# Patient Record
Sex: FEMALE | Race: WHITE | NOT HISPANIC OR LATINO | Employment: OTHER | URBAN - METROPOLITAN AREA
[De-identification: names, ages, dates, MRNs, and addresses within clinical notes are randomized per-mention and may not be internally consistent; named-entity substitution may affect disease eponyms.]

---

## 2017-05-09 ENCOUNTER — OFFICE VISIT (OUTPATIENT)
Dept: FAMILY MEDICINE | Facility: CLINIC | Age: 82
End: 2017-05-09
Payer: MEDICARE

## 2017-05-09 VITALS
SYSTOLIC BLOOD PRESSURE: 158 MMHG | RESPIRATION RATE: 18 BRPM | HEIGHT: 63 IN | BODY MASS INDEX: 25.04 KG/M2 | WEIGHT: 141.31 LBS | DIASTOLIC BLOOD PRESSURE: 76 MMHG | OXYGEN SATURATION: 96 % | HEART RATE: 67 BPM

## 2017-05-09 DIAGNOSIS — R73.03 PREDIABETES: Chronic | ICD-10-CM

## 2017-05-09 DIAGNOSIS — I10 ESSENTIAL HYPERTENSION: ICD-10-CM

## 2017-05-09 DIAGNOSIS — Z00.00 ANNUAL PHYSICAL EXAM: ICD-10-CM

## 2017-05-09 DIAGNOSIS — E03.4 HYPOTHYROIDISM DUE TO ACQUIRED ATROPHY OF THYROID: Primary | Chronic | ICD-10-CM

## 2017-05-09 DIAGNOSIS — E78.5 HYPERLIPIDEMIA, UNSPECIFIED HYPERLIPIDEMIA TYPE: ICD-10-CM

## 2017-05-09 DIAGNOSIS — M81.0 OSTEOPOROSIS, POST-MENOPAUSAL: ICD-10-CM

## 2017-05-09 PROCEDURE — 99999 PR PBB SHADOW E&M-EST. PATIENT-LVL IV: CPT | Mod: PBBFAC,,, | Performed by: FAMILY MEDICINE

## 2017-05-09 PROCEDURE — 99214 OFFICE O/P EST MOD 30 MIN: CPT | Mod: PBBFAC,PO | Performed by: FAMILY MEDICINE

## 2017-05-09 PROCEDURE — 99214 OFFICE O/P EST MOD 30 MIN: CPT | Mod: S$PBB,,, | Performed by: FAMILY MEDICINE

## 2017-05-09 RX ORDER — SIMVASTATIN 40 MG/1
40 TABLET, FILM COATED ORAL NIGHTLY
Qty: 90 TABLET | Refills: 1 | Status: SHIPPED | OUTPATIENT
Start: 2017-05-09 | End: 2017-10-18 | Stop reason: SDUPTHER

## 2017-05-09 RX ORDER — ALENDRONATE SODIUM 70 MG/1
70 TABLET ORAL
Qty: 12 TABLET | Refills: 4 | Status: SHIPPED | OUTPATIENT
Start: 2017-05-09 | End: 2021-01-01

## 2017-05-09 RX ORDER — SIMVASTATIN 40 MG/1
40 TABLET, FILM COATED ORAL NIGHTLY
COMMUNITY
End: 2017-05-09 | Stop reason: SDUPTHER

## 2017-05-09 RX ORDER — LEVOTHYROXINE SODIUM 75 UG/1
75 TABLET ORAL DAILY
Qty: 90 TABLET | Refills: 1 | Status: SHIPPED | OUTPATIENT
Start: 2017-05-09 | End: 2017-10-18 | Stop reason: SDUPTHER

## 2017-05-09 RX ORDER — LEVOTHYROXINE SODIUM 150 UG/1
150 TABLET ORAL DAILY
Qty: 90 TABLET | Refills: 4 | Status: SHIPPED | OUTPATIENT
Start: 2017-05-09 | End: 2017-05-09

## 2017-05-09 RX ORDER — METOPROLOL SUCCINATE 50 MG/1
50 TABLET, EXTENDED RELEASE ORAL DAILY
Qty: 90 TABLET | Refills: 4 | Status: ON HOLD | OUTPATIENT
Start: 2017-05-09 | End: 2018-01-08

## 2017-05-09 RX ORDER — LISINOPRIL 40 MG/1
40 TABLET ORAL DAILY
Qty: 90 TABLET | Refills: 4 | Status: ON HOLD | OUTPATIENT
Start: 2017-05-09 | End: 2019-06-21 | Stop reason: HOSPADM

## 2017-05-09 RX ORDER — NIFEDIPINE 30 MG/1
30 TABLET, FILM COATED, EXTENDED RELEASE ORAL DAILY
Qty: 90 TABLET | Refills: 4 | Status: ON HOLD | OUTPATIENT
Start: 2017-05-09 | End: 2018-01-18 | Stop reason: HOSPADM

## 2017-05-09 NOTE — MR AVS SNAPSHOT
[facility]  [address] [address]  Phone: [phone]  Fax: [phone]                  Treasure Rueda   2017 1:00 PM   Office Visit    Description:  Female : 1935   Provider:  Alee Cortez MD   Department:  [facility]           Reason for Visit     Establish Care           Diagnoses this Visit        Comments    Hypothyroidism due to acquired atrophy of thyroid    -  Primary     Essential hypertension         Osteoporosis, post-menopausal         Hyperlipidemia, unspecified hyperlipidemia type         Prediabetes         Annual physical exam                To Do List           Goals (5 Years of Data)     None      Follow-Up and Disposition     Return in about 6 months (around 2017).       These Medications        Disp Refills Start End    simvastatin (ZOCOR) 40 MG tablet 90 tablet 1 2017     Take 1 tablet (40 mg total) by mouth every evening. - Oral    nifedipine (ADALAT CC) 30 MG TbSR 90 tablet 4 2017     Take 1 tablet (30 mg total) by mouth once daily. - Oral    metoprolol succinate (TOPROL-XL) 50 MG 24 hr tablet 90 tablet 4 2017    Take 1 tablet (50 mg total) by mouth once daily. - Oral    lisinopril (PRINIVIL,ZESTRIL) 40 MG tablet 90 tablet 4 2017    Take 1 tablet (40 mg total) by mouth once daily. - Oral    alendronate (FOSAMAX) 70 MG tablet 12 tablet 4 2017     Take 1 tablet (70 mg total) by mouth every 7 days. - Oral    levothyroxine (SYNTHROID) 75 MCG tablet 90 tablet 1 2017    Take 1 tablet (75 mcg total) by mouth once daily. - Oral      [facility]     [facility] On Call Nurse Care Line -  Assistance  Unless otherwise directed by your provider, please contact [facility], our nurse care line that is available for  assistance.     Registered nurses in the [facility] provide: appointment scheduling, clinical advisement, health education,  and other advisory services.  Call: [phone] (toll free)               Medications           Message regarding Medications     Verify the changes and/or additions to your medication regime listed below are the same as discussed with your clinician today.  If any of these changes or additions are incorrect, please notify your healthcare provider.        START taking these NEW medications        Refills    simvastatin (ZOCOR) 40 MG tablet 1    Sig: Take 1 tablet (40 mg total) by mouth every evening.    Class: Print    Route: Oral    levothyroxine (SYNTHROID) 75 MCG tablet 1    Sig: Take 1 tablet (75 mcg total) by mouth once daily.    Class: Print    Route: Oral      STOP taking these medications     atorvastatin (LIPITOR) 40 MG tablet Take 1 tablet (40 mg total) by mouth once daily.           Verify that the below list of medications is an accurate representation of the medications you are currently taking.  If none reported, the list may be blank. If incorrect, please contact your healthcare provider. Carry this list with you in case of emergency.           Current Medications     alendronate (FOSAMAX) 70 MG tablet Take 1 tablet (70 mg total) by mouth every 7 days.    COD LIVER OIL ORAL Take by mouth.    folic acid (FOLVITE) 400 MCG tablet Take 400 mcg by mouth once daily.    levothyroxine (SYNTHROID) 75 MCG tablet Take 1 tablet (75 mcg total) by mouth once daily.    lisinopril (PRINIVIL,ZESTRIL) 40 MG tablet Take 1 tablet (40 mg total) by mouth once daily.    metoprolol succinate (TOPROL-XL) 50 MG 24 hr tablet Take 1 tablet (50 mg total) by mouth once daily.    nifedipine (ADALAT CC) 30 MG TbSR Take 1 tablet (30 mg total) by mouth once daily.    simvastatin (ZOCOR) 40 MG tablet Take 1 tablet (40 mg total) by mouth every evening.    vitamin D 185 MG Tab Take 185 mg by mouth once daily.           Clinical Reference Information           Your Vitals Were     BP Pulse Resp Height Weight SpO2    158/76 (BP  "Location: Left arm, Patient Position: Sitting, BP Method: Manual) 67 18 5' 3" (1.6 m) 64.1 kg (141 lb 5 oz) 96%    BMI                25.03 kg/m2          Blood Pressure          Most Recent Value    BP  (!)  158/76      Allergies as of 5/9/2017     Iodine And Iodide Containing Products      Immunizations Administered on Date of Encounter - 5/9/2017     None      Orders Placed During Today's Visit     Future Labs/Procedures Expected by Expires    Comprehensive metabolic panel  5/9/2017 7/8/2018    DXA Bone Density Spine And Hip  5/9/2017 5/9/2018    Hemoglobin A1c  5/9/2017 7/8/2018    Lipid panel  5/9/2017 7/8/2018    T4  5/9/2017 7/8/2018    TSH  5/9/2017 7/8/2018      Instructions      Eating Heart-Healthy Foods  Eating has a big impact on your heart health. In fact, eating healthier can improve several of your heart risks at once. For instance, it helps you manage weight, cholesterol, and blood pressure. Here are ideas to help you make heart-healthy changes without giving up all the foods and flavors you love.  Getting started  · Talk with your health care provider about eating plans, such as the DASH or Mediterranean diet. You may also be referred to a dietitian.  · Change a few things at a time. Give yourself time to get used to a few eating changes before adding more.  · Work to create a tasty, healthy eating plan that you can stick to for the rest of your life.    Goals for healthy eating  Below are some tips to improve your eating habits:  · Limit saturated fats and trans fats. Saturated fats raise your levels of cholesterol, so keep these fats to a minimum. They are found in foods such as fatty meats, whole milk, cheese, and palm and coconut oils. Avoid trans fats because they lower good cholesterol as well as raise bad cholesterol. Trans fats are most often found in processed foods.  · Reduce sodium (salt) intake. Eating too much salt may increase your blood pressure. Limit your sodium intake to 2,300 " milligrams (mg) per day, or less if your health care provider recommends it. Dining out less often and eating fewer processed foods are two great ways to decrease the amount of salt you consume.  · Managing calories. A calorie is a unit of energy. Your body burns calories for fuel, but if you eat more calories than your body burns, the extras are stored as fat. Your health care provider can help you create a diet plan to manage your calories. This will likely include eating healthier foods as well as exercising regularly. To help you track your progress, keep a diary to record what you eat and how often you exercise.  Choose the right foods  Aim to make these foods staples of your diet. If you have diabetes, you may have different recommendations than what is listed here:  · Fruits and vegetable provide plenty of nutrients without a lot of calories. At meals, fill half your plate with these foods. Split the other half of your plate between whole grains and lean protein.  · Whole grains are high in fiber and rich in vitamins and nutrients. Good choices include whole-wheat bread, pasta, and brown rice.  · Lean proteins give you nutrition with less fat. Good choices include fish, skinless chicken, and beans.  · Low-fat or nonfat dairy provides nutrients without a lot of fat. Try low-fat or nonfat milk, cheese, or yogurt.  · Healthy fats can be good for you in small amounts. These are unsaturated fats, such as olive oil, nuts, and fish. Try to have at least 2 servings per week of fatty fish such as salmon, sardines, mackerel, rainbow trout, and albacore tuna. These contain omega-3 fatty acids, which are good for your heart. Flaxseed is another source of a heart-healthy fat.  More on heart healthy eating    Read food labels  Healthy eating starts at the grocery store. Be sure to pay attention to food labels on packaged foods. Look for products that are high in fiber and protein, and low in saturated fat, cholesterol, and  sodium. Avoid products that contain trans fat. And pay close attention to serving size. For instance, if you plan to eat two servings, double all the numbers on the label.  Prepare food right  A key part of healthy cooking is cutting down on added fat and salt. Look on the internet for lower-fat, lower-sodium recipes. Also, try these tips:  · Remove fat from meat and skin from poultry before cooking.  · Skim fat from the surface of soups and sauces.  · Broil, boil, bake, steam, grill, and microwave food without added fats.  · Choose ingredients that spice up your food without adding calories, fat, or sodium. Try these items: horseradish, hot sauce, lemon, mustard, nonfat salad dressings, and vinegar. For salt-free herbs and spices, try basil, cilantro, cinnamon, pepper, and rosemary.  Date Last Reviewed: 6/25/2015  © 9960-9439 [employer]. [address] [place] [address]. All rights reserved. This information is not intended as a substitute for professional medical care. Always follow your healthcare professional's instructions.             Language Assistance Services     ATTENTION: Language assistance services are available, free of charge. Please call [phone].      ATENCIÓN: Si saulla radha, tiene a miramontes disposición servicios gratuitos de asistencia lingüística. Llame al [phone].     CHÚ Ý: N?u b?n nói Ti?ng Vi?t, có các d?ch v? h? tr? ngôn ng? mi?n phí dành cho b?n. G?i s? [phone].         [facility] complies with applicable Federal civil rights laws and does not discriminate on the basis of race, color, national origin, age, disability, or sex.

## 2017-05-09 NOTE — PATIENT INSTRUCTIONS

## 2017-05-09 NOTE — PROGRESS NOTES
Subjective:       Patient ID: Treasure Rueda is a 81 y.o. female.    Chief Complaint: Establish Care    HPI 81 year old female here with her daughter to establish care.Patient has HTN that is labile per patient. She states earlier today it was 116/78. She takes lisinopril, metoprolol, and nifedipine daily.  She has hypothyroidism which was diagnosed in 1990 and she takes synthroid 75 mc gdaily.  Patient has chronic HLD which is not controlled. She takes zocor daily. Patient is active throughout her home and gardens/walks daily. She states she mostly eats fruits/vegetables and lean meats.  She has osteoporosis and is on fosamax. She is due for a dexa scan.   Patient refuses all vaccines.  She is no longer due for mammogram/colonoscopy due to age.  Patient lives at home alone. She performs her ADL without assistance. She has not had any recent falls. Her nearest family member is 1/2 mile away. She has chronic hearing loss since the age of 15 and her family has keys to her house if needed.   Review of Systems   Constitutional: Negative for chills and fever.   Respiratory: Negative for chest tightness and shortness of breath.    Cardiovascular: Negative for chest pain and leg swelling.   Gastrointestinal: Negative for abdominal pain, blood in stool, diarrhea, nausea and vomiting.   Genitourinary: Negative for dysuria and hematuria.   Psychiatric/Behavioral: Negative for agitation and behavioral problems.       Objective:      Vitals:    05/09/17 1306   BP: (!) 158/76   Pulse: 67   Resp: 18     Physical Exam   Constitutional: She is oriented to person, place, and time. She appears well-developed and well-nourished. No distress.   HENT:   Mouth/Throat: Oropharynx is clear and moist. No oropharyngeal exudate.   Eyes: EOM are normal. Right eye exhibits no discharge. Left eye exhibits no discharge.   Neck: Normal range of motion.   Cardiovascular: Normal rate and regular rhythm.    Pulmonary/Chest: Effort normal.  "She has no wheezes.   Abdominal: Soft. There is no tenderness. There is no rebound and no guarding.   Lymphadenopathy:     She has no cervical adenopathy.   Neurological: She is alert and oriented to person, place, and time.   Skin: She is not diaphoretic.   Psychiatric: She has a normal mood and affect. Her behavior is normal. Judgment and thought content normal.   Vitals reviewed.      Assessment:       1. Hypothyroidism due to acquired atrophy of thyroid    2. Essential hypertension    3. Osteoporosis, post-menopausal    4. Hyperlipidemia, unspecified hyperlipidemia type    5. Prediabetes    6. Annual physical exam        Plan:         1. HTN: uncontrolled today. Per patient it fluctuates and is mostly "normal" at home. Patient advised on low salt diet and to contact me if it stays high  2. Hypothyroidism: will check tsh/t4. Refilled synthroid 75 mcg daily.   3. HLD: check lipid panel.information given on the dash diet  4. Osteoporosis: repeat dexa ordered.continue weekly fosamax  5. Prediabetes: check a1c  6. Immunizations: patient refuses all vaccines, benefits discussed  7. RTC 6 months.   Hypothyroidism due to acquired atrophy of thyroid  -     T4; Future; Expected date: 5/9/17  -     TSH; Future; Expected date: 5/9/17    Essential hypertension  -     nifedipine (ADALAT CC) 30 MG TbSR; Take 1 tablet (30 mg total) by mouth once daily.  Dispense: 90 tablet; Refill: 4  -     metoprolol succinate (TOPROL-XL) 50 MG 24 hr tablet; Take 1 tablet (50 mg total) by mouth once daily.  Dispense: 90 tablet; Refill: 4  -     lisinopril (PRINIVIL,ZESTRIL) 40 MG tablet; Take 1 tablet (40 mg total) by mouth once daily.  Dispense: 90 tablet; Refill: 4  -     Comprehensive metabolic panel; Future; Expected date: 5/9/17    Osteoporosis, post-menopausal  -     alendronate (FOSAMAX) 70 MG tablet; Take 1 tablet (70 mg total) by mouth every 7 days.  Dispense: 12 tablet; Refill: 4  -     DXA Bone Density Spine And Hip; Future; " Expected date: 5/9/17    Hyperlipidemia, unspecified hyperlipidemia type  -     Lipid panel; Future; Expected date: 5/9/17    Prediabetes  -     Hemoglobin A1c; Future; Expected date: 5/9/17    Annual physical exam    Other orders  -     simvastatin (ZOCOR) 40 MG tablet; Take 1 tablet (40 mg total) by mouth every evening.  Dispense: 90 tablet; Refill: 1  -     Discontinue: levothyroxine (SYNTHROID) 150 MCG tablet; Take 1 tablet (150 mcg total) by mouth once daily.  Dispense: 90 tablet; Refill: 4  -     levothyroxine (SYNTHROID) 75 MCG tablet; Take 1 tablet (75 mcg total) by mouth once daily.  Dispense: 90 tablet; Refill: 1    Return in about 6 months (around 11/9/2017).

## 2017-05-16 ENCOUNTER — PATIENT MESSAGE (OUTPATIENT)
Dept: FAMILY MEDICINE | Facility: CLINIC | Age: 82
End: 2017-05-16

## 2017-08-14 PROBLEM — Z00.00 ANNUAL PHYSICAL EXAM: Status: RESOLVED | Noted: 2017-05-09 | Resolved: 2017-08-14

## 2017-10-18 ENCOUNTER — OFFICE VISIT (OUTPATIENT)
Dept: FAMILY MEDICINE | Facility: CLINIC | Age: 82
End: 2017-10-18
Payer: MEDICARE

## 2017-10-18 VITALS
WEIGHT: 139.38 LBS | HEIGHT: 63 IN | DIASTOLIC BLOOD PRESSURE: 64 MMHG | HEART RATE: 78 BPM | SYSTOLIC BLOOD PRESSURE: 160 MMHG | BODY MASS INDEX: 24.7 KG/M2 | OXYGEN SATURATION: 95 %

## 2017-10-18 DIAGNOSIS — E78.5 HYPERLIPIDEMIA, UNSPECIFIED HYPERLIPIDEMIA TYPE: ICD-10-CM

## 2017-10-18 DIAGNOSIS — R06.02 SHORTNESS OF BREATH: ICD-10-CM

## 2017-10-18 DIAGNOSIS — E03.4 HYPOTHYROIDISM DUE TO ACQUIRED ATROPHY OF THYROID: Primary | Chronic | ICD-10-CM

## 2017-10-18 DIAGNOSIS — I10 ESSENTIAL HYPERTENSION: Chronic | ICD-10-CM

## 2017-10-18 DIAGNOSIS — Z91.89 AT RISK FOR HEART DISEASE: ICD-10-CM

## 2017-10-18 PROCEDURE — 99999 PR PBB SHADOW E&M-EST. PATIENT-LVL IV: CPT | Mod: PBBFAC,,, | Performed by: FAMILY MEDICINE

## 2017-10-18 PROCEDURE — 99214 OFFICE O/P EST MOD 30 MIN: CPT | Mod: PBBFAC,PO | Performed by: FAMILY MEDICINE

## 2017-10-18 PROCEDURE — 99214 OFFICE O/P EST MOD 30 MIN: CPT | Mod: S$PBB,,, | Performed by: FAMILY MEDICINE

## 2017-10-18 RX ORDER — SIMVASTATIN 40 MG/1
40 TABLET, FILM COATED ORAL NIGHTLY
Qty: 90 TABLET | Refills: 1 | Status: ON HOLD | OUTPATIENT
Start: 2017-10-18 | End: 2019-06-20

## 2017-10-18 RX ORDER — LEVOTHYROXINE SODIUM 75 UG/1
75 TABLET ORAL DAILY
Qty: 90 TABLET | Refills: 1 | Status: SHIPPED | OUTPATIENT
Start: 2017-10-18 | End: 2021-01-01

## 2017-10-18 RX ORDER — CETIRIZINE HYDROCHLORIDE 10 MG/1
10 TABLET ORAL DAILY
Qty: 90 TABLET | Refills: 0 | Status: ON HOLD | OUTPATIENT
Start: 2017-10-18 | End: 2019-12-20 | Stop reason: CLARIF

## 2017-10-18 NOTE — PROGRESS NOTES
"Subjective:       Patient ID: Treasure Rueda is a 81 y.o. female.    Chief Complaint: Follow-up    HPI 81 year old female here for follow up visit.  Patient states five days ago she had two episodes where her lower jaw and left hurt was painful. She states the left arm was a dull achey pain that "went to the bone". Patient states she did not have chest pains or sob. She took aspirin 325 mg and states pain subsided in one hour. she did not go to the ED because she is mostly deaf and states she is scared of hospitals. Her daughter was not in town. Patient has not had CAD. She has HTN which is uncontrolled when coming to the physician offices.   Patient lives alone but has children nearby. She rides her bike, cuts her grass and cooks and cleans.she denies recent falls.       Review of Systems   Constitutional: Negative for chills and fever.   HENT: Positive for postnasal drip and sinus pressure.    Respiratory: Negative for chest tightness and shortness of breath.    Cardiovascular: Negative for chest pain and leg swelling.   Gastrointestinal: Negative for abdominal pain, diarrhea, nausea and vomiting.   Genitourinary: Negative for dysuria and hematuria.   Psychiatric/Behavioral: Negative for agitation, behavioral problems and confusion.       Objective:      Vitals:    10/18/17 0954   BP: (!) 160/64   Pulse:      Physical Exam   Constitutional: She is oriented to person, place, and time. She appears well-developed and well-nourished. No distress.   HENT:   Mouth/Throat: Oropharynx is clear and moist. No oropharyngeal exudate.   Eyes: EOM are normal. Right eye exhibits no discharge. Left eye exhibits no discharge.   Neck: Normal range of motion.   Cardiovascular: Normal rate and regular rhythm.    Pulmonary/Chest: Effort normal. She has no wheezes.   Abdominal: Soft. There is no tenderness. There is no guarding.   Musculoskeletal: Normal range of motion.   Lymphadenopathy:     She has no cervical adenopathy. "   Neurological: She is alert and oriented to person, place, and time.   Skin: She is not diaphoretic.   Psychiatric: She has a normal mood and affect. Her behavior is normal. Judgment and thought content normal.   Vitals reviewed.      Assessment:       1. Hypothyroidism due to acquired atrophy of thyroid    2. Hyperlipidemia, unspecified hyperlipidemia type    3. Essential hypertension    4. Shortness of breath    5. At risk for heart disease        Plan:         1. Hypothyroidism: check tsh  2. HLD: zocor refilled.   3. HTN: uncontrolled. Patient has been chronically anxious coming to the physicians office. Per patient, her BP at home ranges 120-140/60s. She was advised to call clinic if her BP is persistently elevated  4. With recent jaw and left arm pain, and her increased risk for CAD given age, HTN-I will refer to cards. patient and daughter strongly advised to seek medical attention if her symptoms re-occur.   zyrtec prescribed for chronic sinusitis  Patient refuses flu shot.       Hypothyroidism due to acquired atrophy of thyroid  -     TSH; Future; Expected date: 10/18/2017    Hyperlipidemia, unspecified hyperlipidemia type    Essential hypertension    Shortness of breath  -     SCHEDULED EKG 12-LEAD (to Muse); Future    At risk for heart disease  -     Ambulatory referral to Cardiology    Other orders  -     levothyroxine (SYNTHROID) 75 MCG tablet; Take 1 tablet (75 mcg total) by mouth once daily.  Dispense: 90 tablet; Refill: 1  -     simvastatin (ZOCOR) 40 MG tablet; Take 1 tablet (40 mg total) by mouth every evening.  Dispense: 90 tablet; Refill: 1  -     cetirizine (ZYRTEC) 10 MG tablet; Take 1 tablet (10 mg total) by mouth once daily.  Dispense: 90 tablet; Refill: 0      Return in about 6 months (around 4/18/2018).

## 2017-11-02 ENCOUNTER — TELEPHONE (OUTPATIENT)
Dept: FAMILY MEDICINE | Facility: CLINIC | Age: 82
End: 2017-11-02

## 2017-11-02 NOTE — TELEPHONE ENCOUNTER
----- Message from Alee Cortez MD sent at 11/2/2017  9:51 AM CDT -----  Can you please inform patient her ekg did not reveal any significant abnormality. Thank you

## 2017-12-14 PROBLEM — R94.39 ABNORMAL CARDIOVASCULAR STRESS TEST: Status: ACTIVE | Noted: 2017-12-14

## 2017-12-14 PROBLEM — I25.10 CORONARY ARTERY DISEASE INVOLVING NATIVE CORONARY ARTERY: Status: ACTIVE | Noted: 2017-12-14

## 2017-12-14 PROBLEM — I50.31 ACUTE DIASTOLIC CONGESTIVE HEART FAILURE: Status: ACTIVE | Noted: 2017-12-14

## 2017-12-14 PROBLEM — R94.39 ABNORMAL CARDIOVASCULAR STRESS TEST: Status: RESOLVED | Noted: 2017-12-14 | Resolved: 2017-12-14

## 2018-01-06 PROBLEM — I21.4 NSTEMI (NON-ST ELEVATED MYOCARDIAL INFARCTION): Status: ACTIVE | Noted: 2018-01-06

## 2018-01-07 PROBLEM — R79.89 ELEVATED TROPONIN: Status: ACTIVE | Noted: 2018-01-07

## 2018-01-07 PROBLEM — I48.92 NEW ONSET ATRIAL FLUTTER: Status: ACTIVE | Noted: 2018-01-07

## 2018-01-08 PROBLEM — I48.92 NEW ONSET ATRIAL FLUTTER: Status: ACTIVE | Noted: 2018-01-08

## 2018-01-08 PROBLEM — I21.4 NSTEMI (NON-ST ELEVATED MYOCARDIAL INFARCTION): Status: RESOLVED | Noted: 2018-01-08 | Resolved: 2018-01-08

## 2018-01-08 PROBLEM — R79.89 ELEVATED TROPONIN: Status: ACTIVE | Noted: 2018-01-08

## 2018-01-08 PROBLEM — I21.4 NSTEMI (NON-ST ELEVATED MYOCARDIAL INFARCTION): Status: ACTIVE | Noted: 2018-01-08

## 2018-01-15 PROBLEM — I47.10 SVT (SUPRAVENTRICULAR TACHYCARDIA): Status: ACTIVE | Noted: 2018-01-15

## 2018-01-16 PROBLEM — I50.32 CHRONIC DIASTOLIC CONGESTIVE HEART FAILURE: Status: ACTIVE | Noted: 2017-12-14

## 2018-01-16 PROBLEM — I47.10 SVT (SUPRAVENTRICULAR TACHYCARDIA): Status: ACTIVE | Noted: 2018-01-16

## 2018-01-17 PROBLEM — R29.898 WEAKNESS OF LEFT ARM: Status: ACTIVE | Noted: 2018-01-17

## 2018-01-22 ENCOUNTER — PATIENT OUTREACH (OUTPATIENT)
Dept: ADMINISTRATIVE | Facility: CLINIC | Age: 83
End: 2018-01-22

## 2018-01-22 NOTE — PATIENT INSTRUCTIONS
Atrial Flutter    Atrial flutter means that your heart is beating very fast. It is caused by a problem in the electrical pathways of the heart. It can be a sign of heart disease or other health problems that affect your heart.  Palpitations are the most common symptom of atrial flutter. This is the feeling that your heart is fluttering or beating fast or hard. When your heart beats too fast, it doesnt pump blood very well. This can cause other symptoms, including:  · Anxiety  · Fatigue  · Shortness of breath  · Chest pain  · Dizziness  · Fainting  If this is the first time youve had atrial flutter, and you dont have heart or lung disease, it may never happen again. But in most cases, atrial flutter comes and goes. It can last from a few hours to a couple of days. Sometimes the atrial flutter doesnt ever go away. This is chronic atrial flutter.  Atrial flutter may be caused by heart disease. It may also be caused by other conditions that affect the heart:  · Coronary artery disease (arteriosclerosis)  · High blood pressure  · Disease of the heart valves  · Enlarged heart  Atrial flutter can occur without heart disease. This may be because of:  · Overactive thyroid (hyperthyroid)  · Chronic lung disease (COPD, emphysema, or bronchitis)  · Alcohol use  · Drugs or medicines that stimulate the heart. These include cocaine, amphetamines, diet pills, some decongestant cold medicines, caffeine, and nicotine.  · Infection  Treating or removing these causes will make it more likely that treatment for atrial flutter will work. It will also make it less likely that atrial flutter will come back.  Atrial flutter can happen along with another abnormal rhythm called atrial fibrillation. The risk for stroke is higher with these conditions. Getting treatment can reduce your risk.  Home care  Follow these guidelines when caring for yourself at home:  · Go back to your usual activities as soon as you feel back to normal.  · If you  smoke, stop smoking. Talk with your healthcare provider or call a local stop-smoking program for help.  · Dont take drugs or medicines that stimulate your heart. These include cocaine, amphetamines, diet pills, some decongestant cold medicines, caffeine, and nicotine.  · Your provider may have prescribed medicine to stop atrial fibrillation from coming back. Take this medicine exactly as directed. Some medicines must be taken every day to work as they should.  · Your provider may also have prescribed warfarin to lower your risk for stroke. Have your blood tested regularly as advised by your healthcare provider. This will help make sure the dose is right for you.  Follow-up care  Follow up with your healthcare provider, or as advised.  When should I call my healthcare provider?  Call your healthcare provider right away if any of these occur:  · Shortness of breath gets worse  · Swelling in either leg  · Unexpected weight gain  · Chest pain or pressure  · Near fainting or fainting  · You feel like your heart is fluttering, or beating fast or hard  · Fever of 100.4°F (38°C) or higher, or as directed by your healthcare provider  · Cough with dark-colored or bloody mucus  Also call your provider right away if you have signs of stroke:  · Weakness or numbness of an arm or leg or one side of the face  · Difficulty speaking or seeing  · Extreme drowsiness, confusion, dizziness, or fainting   Date Last Reviewed: 5/1/2016  © 0639-5440 [employer]. [address] [place] [address]. All rights reserved. This information is not intended as a substitute for professional medical care. Always follow your healthcare professional's instructions.

## 2018-01-26 PROBLEM — I65.21 STENOSIS OF RIGHT CAROTID ARTERY: Status: ACTIVE | Noted: 2018-01-26

## 2018-07-19 ENCOUNTER — TELEPHONE (OUTPATIENT)
Dept: FAMILY MEDICINE | Facility: CLINIC | Age: 83
End: 2018-07-19

## 2018-07-19 NOTE — TELEPHONE ENCOUNTER
Attempt to contact pt for ER follow up, no answer and left message to call us to schedule a follow up, please schedule any day with Dr. Margarita Allen, she has the earliest appt's available.

## 2018-08-21 ENCOUNTER — TELEPHONE (OUTPATIENT)
Dept: ELECTROPHYSIOLOGY | Facility: CLINIC | Age: 83
End: 2018-08-21

## 2018-08-21 ENCOUNTER — OFFICE VISIT (OUTPATIENT)
Dept: ELECTROPHYSIOLOGY | Facility: CLINIC | Age: 83
End: 2018-08-21
Payer: MEDICARE

## 2018-08-21 ENCOUNTER — HOSPITAL ENCOUNTER (OUTPATIENT)
Dept: CARDIOLOGY | Facility: CLINIC | Age: 83
Discharge: HOME | End: 2018-08-21
Payer: MEDICARE

## 2018-08-21 VITALS
SYSTOLIC BLOOD PRESSURE: 140 MMHG | DIASTOLIC BLOOD PRESSURE: 80 MMHG | WEIGHT: 141.75 LBS | HEIGHT: 63 IN | HEART RATE: 61 BPM | BODY MASS INDEX: 25.12 KG/M2

## 2018-08-21 DIAGNOSIS — I47.10 SVT (SUPRAVENTRICULAR TACHYCARDIA): ICD-10-CM

## 2018-08-21 DIAGNOSIS — I49.9 CARDIAC ARRHYTHMIA, UNSPECIFIED CARDIAC ARRHYTHMIA TYPE: ICD-10-CM

## 2018-08-21 DIAGNOSIS — I47.10 SVT (SUPRAVENTRICULAR TACHYCARDIA): Primary | ICD-10-CM

## 2018-08-21 DIAGNOSIS — I49.9 CARDIAC ARRHYTHMIA, UNSPECIFIED CARDIAC ARRHYTHMIA TYPE: Primary | ICD-10-CM

## 2018-08-21 DIAGNOSIS — I49.5 TACHY-BRADY SYNDROME: ICD-10-CM

## 2018-08-21 DIAGNOSIS — Z91.89 AT RISK FOR HEART DISEASE: Primary | ICD-10-CM

## 2018-08-21 DIAGNOSIS — I10 ESSENTIAL HYPERTENSION: Chronic | ICD-10-CM

## 2018-08-21 PROCEDURE — 99999 PR PBB SHADOW E&M-EST. PATIENT-LVL III: CPT | Mod: PBBFAC,,, | Performed by: INTERNAL MEDICINE

## 2018-08-21 PROCEDURE — 93005 ELECTROCARDIOGRAM TRACING: CPT | Mod: PBBFAC | Performed by: INTERNAL MEDICINE

## 2018-08-21 PROCEDURE — 99213 OFFICE O/P EST LOW 20 MIN: CPT | Mod: PBBFAC,25 | Performed by: INTERNAL MEDICINE

## 2018-08-21 PROCEDURE — 99205 OFFICE O/P NEW HI 60 MIN: CPT | Mod: S$PBB,,, | Performed by: INTERNAL MEDICINE

## 2018-08-21 PROCEDURE — 93010 ELECTROCARDIOGRAM REPORT: CPT | Mod: S$PBB,,, | Performed by: INTERNAL MEDICINE

## 2018-08-21 RX ORDER — AMIODARONE HYDROCHLORIDE 200 MG/1
400 TABLET ORAL
Status: ON HOLD | COMMUNITY
Start: 2018-07-13 | End: 2018-09-05

## 2018-08-21 RX ORDER — AMLODIPINE BESYLATE 2.5 MG/1
2.5 TABLET ORAL 2 TIMES DAILY
Status: ON HOLD | COMMUNITY
End: 2019-06-20

## 2018-08-21 RX ORDER — ASPIRIN 81 MG/1
81 TABLET ORAL DAILY
Status: ON HOLD | COMMUNITY
End: 2019-06-21 | Stop reason: HOSPADM

## 2018-08-21 NOTE — PROGRESS NOTES
Subjective:    Patient ID:  Treasure Rueda is a 82 y.o. female who presents for evaluation of SVT      82 yoF AFL, SVT, CAD s/p PCI here for arrhythmia management. She has history of arrhythmia. Per notes there is AF as well as AFL. The only available ECGs and holter monitors show short RP tachycardia. A holter monitor 3/18 shows short RP tachycardia. She is symptomatic in her arrhythmia. She has been on sotalol until recently when she was switched to amiodarone. Apixaban has been used for CVA prophylaxis. Her referring cardiologist, Dr Panda, sent her for PM consideration as well as arrhythmia management.     Echo 1/18:  CONCLUSIONS     1 - Normal left ventricular systolic function (EF 55-60%).     2 - Indeterminate LV diastolic function.     3 - Normal right ventricular systolic function .     [facility] 12/14/17- PCI, mid RCA 99%    Past Medical History:  No date: Atrial flutter      Comment:  Stent place in December 2017  No date: CAD (coronary artery disease)      Comment:  with stent placement  No date: Hearing loss of both ears  No date: Hyperlipidemia  No date: Hypertension  No date: Hypothyroidism  No date: Osteoporosis, post-menopausal    Past Surgical History:  No date: APPENDECTOMY  No date: BLADDER SUSPENSION  No date: CORONARY ANGIOPLASTY WITH STENT PLACEMENT  No date: HYSTERECTOMY  No date: TONSILLECTOMY    Social History    Socioeconomic History      Marital status: Other      Spouse name: Not on file      Number of children: Not on file      Years of education: Not on file      Highest education level: Not on file    Social Needs      Financial resource strain: Not on file      Food insecurity - worry: Not on file      Food insecurity - inability: Not on file      Transportation needs - medical: Not on file      Transportation needs - non-medical: Not on file    Occupational History      Not on file    Tobacco Use      Smoking status: Never Smoker      Smokeless tobacco: Never Used    Substance  and Sexual Activity      Alcohol use: Yes        Comment: 3-4 cans of beer daily      Drug use: No      Sexual activity: Not Currently    Other Topics      Concerns:        Not on file    Social History Narrative      Lives alone in [place]. Her  is . She has 2 living children. Her other child  from [place]. She has 2 rental properties.      Review of patient's family history indicates:  Problem: Stroke      Relation: Mother          Age of Onset: (Not Specified)  Problem: Cancer      Relation: Mother          Age of Onset: (Not Specified)          Comment: breast cancer  Problem: Cancer      Relation: Father          Age of Onset: (Not Specified)  Problem: Multiple sclerosis      Relation: Daughter          Age of Onset: (Not Specified)  Problem: Cancer      Relation: Son          Age of Onset: (Not Specified)          Comment: colon and liver          Review of Systems   Constitution: Negative.   HENT: Negative.    Eyes: Negative.    Cardiovascular: Positive for irregular heartbeat and palpitations. Negative for chest pain, dyspnea on exertion, leg swelling, near-syncope and syncope.   Respiratory: Negative.  Negative for shortness of breath.    Endocrine: Negative.    Hematologic/Lymphatic: Negative.    Skin: Negative.    Musculoskeletal: Negative.    Gastrointestinal: Negative.    Genitourinary: Negative.    Neurological: Negative.  Negative for dizziness and light-headedness.   Psychiatric/Behavioral: Negative.    Allergic/Immunologic: Negative.         Objective:    Physical Exam   Constitutional: She is oriented to person, place, and time. She appears well-developed and well-nourished. No distress.   HENT:   Head: Normocephalic and atraumatic.   Eyes: EOM are normal. Pupils are equal, round, and reactive to light.   Neck: Normal range of motion. No JVD present. No thyromegaly present.   Cardiovascular: Normal rate, regular rhythm, S1 normal, S2 normal and normal heart sounds. PMI is not displaced.  Exam reveals no gallop and no friction rub.   No murmur heard.  Pulmonary/Chest: Effort normal and breath sounds normal. No respiratory distress. She has no wheezes. She has no rales.   Abdominal: Soft. Bowel sounds are normal. She exhibits no distension. There is no tenderness. There is no rebound and no guarding.   Musculoskeletal: Normal range of motion. She exhibits no edema or tenderness.   Neurological: She is alert and oriented to person, place, and time. No cranial nerve deficit.   Skin: Skin is warm and dry. No rash noted. No erythema.   Psychiatric: She has a normal mood and affect. Her behavior is normal. Judgment and thought content normal.   Vitals reviewed.    NSR nl ID, QRS, QTc        Assessment:       1. At risk for heart disease    2. SVT (supraventricular tachycardia)    3. Essential hypertension    4. Tachy-jonh syndrome         Plan:       82 yoF SVT here for management. She has recurrent SVT as well as reported history of AFL and AF. The available ECGs show SVT not AFL or AF. I discussed management options with the patient. I offered SVT ablation +/- PM depending on the outcome. Extensive discussion regarding risks, benefits, and alternative approaches to the procedure was had with the patient.  The patient voices understanding and all questions have been answered.  Risks included but were not limited to vascular injury, cardiac perforation, conduction system damage leading to pacemaker implantation, MI, stroke. The patient would like to proceed as planned.    SVT RFA  Anesthesia, MAC  Hold OAC 2 days prior  Hold amio 7 days prior  OSCAR    DC PM if indicated

## 2018-08-21 NOTE — LETTER
August 21, 2018      Clifton Panda MD  [address]  [address]  [facility] [address]           [facility] - Arrhythmia  [address]  [facility] [address]  Phone: [phone]  Fax: [phone]          Patient: Treasure Rueda   MR Number: [id number]   YOB: 1935   Date of Visit: 8/21/2018       Dear Dr. Clifton Panda:    Thank you for referring Treasure Rueda to me for evaluation. Attached you will find relevant portions of my assessment and plan of care.    If you have questions, please do not hesitate to call me. I look forward to following Treasure Rueda along with you.    Sincerely,    Damir David MD    Enclosure  CC:  No Recipients    If you would like to receive this communication electronically, please contact [email or web address] or [phone] to request more information on [employer] access.    For providers and/or their staff who would like to refer a patient to [facility], please contact us through our one-stop-shop provider referral line, [facility], at [phone].    If you feel you have received this communication in error or would no longer like to receive these types of communications, please e-mail [email or web address]

## 2018-08-21 NOTE — H&P (VIEW-ONLY)
Subjective:    Patient ID:  Tresaure Rueda is a 82 y.o. female who presents for evaluation of SVT      82 yoF AFL, SVT, CAD s/p PCI here for arrhythmia management. She has history of arrhythmia. Per notes there is AF as well as AFL. The only available ECGs and holter monitors show short RP tachycardia. A holter monitor 3/18 shows short RP tachycardia. She is symptomatic in her arrhythmia. She has been on sotalol until recently when she was switched to amiodarone. Apixaban has been used for CVA prophylaxis. Her referring cardiologist, Dr Panda, sent her for PM consideration as well as arrhythmia management.     Echo 1/18:  CONCLUSIONS     1 - Normal left ventricular systolic function (EF 55-60%).     2 - Indeterminate LV diastolic function.     3 - Normal right ventricular systolic function .     [facility] 12/14/17- PCI, mid RCA 99%    Past Medical History:  No date: Atrial flutter      Comment:  Stent place in December 2017  No date: CAD (coronary artery disease)      Comment:  with stent placement  No date: Hearing loss of both ears  No date: Hyperlipidemia  No date: Hypertension  No date: Hypothyroidism  No date: Osteoporosis, post-menopausal    Past Surgical History:  No date: APPENDECTOMY  No date: BLADDER SUSPENSION  No date: CORONARY ANGIOPLASTY WITH STENT PLACEMENT  No date: HYSTERECTOMY  No date: TONSILLECTOMY    Social History    Socioeconomic History      Marital status: Other      Spouse name: Not on file      Number of children: Not on file      Years of education: Not on file      Highest education level: Not on file    Social Needs      Financial resource strain: Not on file      Food insecurity - worry: Not on file      Food insecurity - inability: Not on file      Transportation needs - medical: Not on file      Transportation needs - non-medical: Not on file    Occupational History      Not on file    Tobacco Use      Smoking status: Never Smoker      Smokeless tobacco: Never Used    Substance  and Sexual Activity      Alcohol use: Yes        Comment: 3-4 cans of beer daily      Drug use: No      Sexual activity: Not Currently    Other Topics      Concerns:        Not on file    Social History Narrative      Lives alone in [place]. Her  is . She has 2 living children. Her other child  from [place]. She has 2 rental properties.      Review of patient's family history indicates:  Problem: Stroke      Relation: Mother          Age of Onset: (Not Specified)  Problem: Cancer      Relation: Mother          Age of Onset: (Not Specified)          Comment: breast cancer  Problem: Cancer      Relation: Father          Age of Onset: (Not Specified)  Problem: Multiple sclerosis      Relation: Daughter          Age of Onset: (Not Specified)  Problem: Cancer      Relation: Son          Age of Onset: (Not Specified)          Comment: colon and liver          Review of Systems   Constitution: Negative.   HENT: Negative.    Eyes: Negative.    Cardiovascular: Positive for irregular heartbeat and palpitations. Negative for chest pain, dyspnea on exertion, leg swelling, near-syncope and syncope.   Respiratory: Negative.  Negative for shortness of breath.    Endocrine: Negative.    Hematologic/Lymphatic: Negative.    Skin: Negative.    Musculoskeletal: Negative.    Gastrointestinal: Negative.    Genitourinary: Negative.    Neurological: Negative.  Negative for dizziness and light-headedness.   Psychiatric/Behavioral: Negative.    Allergic/Immunologic: Negative.         Objective:    Physical Exam   Constitutional: She is oriented to person, place, and time. She appears well-developed and well-nourished. No distress.   HENT:   Head: Normocephalic and atraumatic.   Eyes: EOM are normal. Pupils are equal, round, and reactive to light.   Neck: Normal range of motion. No JVD present. No thyromegaly present.   Cardiovascular: Normal rate, regular rhythm, S1 normal, S2 normal and normal heart sounds. PMI is not displaced.  Exam reveals no gallop and no friction rub.   No murmur heard.  Pulmonary/Chest: Effort normal and breath sounds normal. No respiratory distress. She has no wheezes. She has no rales.   Abdominal: Soft. Bowel sounds are normal. She exhibits no distension. There is no tenderness. There is no rebound and no guarding.   Musculoskeletal: Normal range of motion. She exhibits no edema or tenderness.   Neurological: She is alert and oriented to person, place, and time. No cranial nerve deficit.   Skin: Skin is warm and dry. No rash noted. No erythema.   Psychiatric: She has a normal mood and affect. Her behavior is normal. Judgment and thought content normal.   Vitals reviewed.    NSR nl NE, QRS, QTc        Assessment:       1. At risk for heart disease    2. SVT (supraventricular tachycardia)    3. Essential hypertension    4. Tachy-jonh syndrome         Plan:       82 yoF SVT here for management. She has recurrent SVT as well as reported history of AFL and AF. The available ECGs show SVT not AFL or AF. I discussed management options with the patient. I offered SVT ablation +/- PM depending on the outcome. Extensive discussion regarding risks, benefits, and alternative approaches to the procedure was had with the patient.  The patient voices understanding and all questions have been answered.  Risks included but were not limited to vascular injury, cardiac perforation, conduction system damage leading to pacemaker implantation, MI, stroke. The patient would like to proceed as planned.    SVT RFA  Anesthesia, MAC  Hold OAC 2 days prior  Hold amio 7 days prior  OSCAR    DC PM if indicated

## 2018-08-22 RX ORDER — DIPHENHYDRAMINE HCL 50 MG
CAPSULE ORAL
Qty: 3 CAPSULE | Refills: 0 | Status: ON HOLD | OUTPATIENT
Start: 2018-08-22 | End: 2019-06-21 | Stop reason: HOSPADM

## 2018-08-22 RX ORDER — PREDNISONE 50 MG/1
TABLET ORAL
Qty: 3 TABLET | Refills: 0 | Status: ON HOLD | OUTPATIENT
Start: 2018-08-22 | End: 2019-08-26 | Stop reason: ALTCHOICE

## 2018-08-22 RX ORDER — CIMETIDINE 300 MG/1
TABLET, FILM COATED ORAL
Qty: 3 TABLET | Refills: 0 | Status: SHIPPED | OUTPATIENT
Start: 2018-08-22 | End: 2018-08-28 | Stop reason: SDUPTHER

## 2018-08-22 NOTE — PROGRESS NOTES
ABLATION/Possible Pacemaker EDUCATION CHECKLIST      Medications:   · HOLD Amiodarone for 7 days prior to procedure. Last dose will be 18.  · HOLD Eliquis for 2 days prior to procedure. Last dose will be 18.  · You may take your other usual morning medications with a sip of water    Contrast Prep:  · Prednisone 50mg  · Benadryl 50mg  · Cimetidine 300mg  Take 1 tablet of each at 11pm the evening prior to procedure &  1 tablet of each at 6 am and 12n on day of procedure      PRE-PROCEDURE TESTIN2018 @ 7 AM  Pre-Procedure labs have been ordered for you at:  [facility]   · Be sure to arrive at your scheduled time. YOU DO NOT HAVE TO FAST FOR THIS LAB WORK!    DAY OF PROCEDURE:    2018 @ 12 PM  Report to Cardiology Waiting Room on 3rd floor of the Hospital    · Do not eat or drink anything after: 12 mn on the night before your procedure  · Shower with Hibiclens the night before and the morning of the procedure.   · Please do not wear makeup (especially mascara) when arriving for your procedure      Directions to the Cardiology Waiting Room  If you park in the Parking Garage:  Take elevators to the 2nd floor  Walk up ramp and turn right by Gold Elevators  Take elevator to the 3rd floor  Upon exiting the elevator, turn away from the clinic areas  Walk long avendaño around to front of hospital to area with windows overlooking [facility]  Check in at Reception Desk  OR  If family is dropping you off:  Have them drop you off at the front of the Hospital  (Near the ER, where all the flags are hung).  Take the E elevators to the 3rd floor.  Check in at the Reception Desk in the waiting room.    · You will be spending the night after your procedure.  · You will need someone to drive you home the day after your procedure.  · Your pain during your procedure will be managed by the anesthesia team.     Any need to reschedule or cancel procedures, or any questions regarding your procedures  should be addressed directly with the Arrhythmia Department Nurses at the following phone number: [phone]

## 2018-08-27 ENCOUNTER — TELEPHONE (OUTPATIENT)
Dept: ELECTROPHYSIOLOGY | Facility: CLINIC | Age: 83
End: 2018-08-27

## 2018-08-27 NOTE — TELEPHONE ENCOUNTER
Spoke with patient's daughter. She wanted to know if she should hold baby ASA prior to procedure. Advised that it is ok to continue the Baby ASA for the procedure. Also wanted to make sure rx for contrast prep was sent to [facility]. Advised that rx was sent and pharmacy confirmed receipt on 8/22. She verbalizes understanding. We reviewed that she is to take her last dose of amiodarone tomorrow, 8/29 and stop the Eliquis after 9/2. She has already fixed her mom's pills for 2 weeks and confirmed stop dates for amio and Eliquis.

## 2018-08-27 NOTE — TELEPHONE ENCOUNTER
----- Message from Aspen Richards sent at 8/27/2018  3:41 PM CDT -----  Contact: Reanna Murphy call pt's daughter Reanna Rueda [phone].  She has a question about stopping her Mother's baby aspirin prior to her procedure on 9/5/18.    Thank you

## 2018-08-28 DIAGNOSIS — I47.10 SVT (SUPRAVENTRICULAR TACHYCARDIA): ICD-10-CM

## 2018-08-29 RX ORDER — CIMETIDINE 400 MG/1
TABLET, FILM COATED ORAL
Qty: 3 TABLET | Refills: 0 | Status: SHIPPED | OUTPATIENT
Start: 2018-08-29 | End: 2018-08-31 | Stop reason: SDUPTHER

## 2018-08-31 DIAGNOSIS — I47.10 SVT (SUPRAVENTRICULAR TACHYCARDIA): ICD-10-CM

## 2018-08-31 RX ORDER — CIMETIDINE 400 MG/1
TABLET, FILM COATED ORAL
Qty: 3 TABLET | Refills: 0 | Status: SHIPPED | OUTPATIENT
Start: 2018-08-31 | End: 2018-08-31 | Stop reason: SDUPTHER

## 2018-08-31 RX ORDER — CIMETIDINE 400 MG/1
TABLET, FILM COATED ORAL
Qty: 3 TABLET | Refills: 0 | Status: ON HOLD | OUTPATIENT
Start: 2018-08-31 | End: 2019-06-21 | Stop reason: HOSPADM

## 2018-08-31 NOTE — TELEPHONE ENCOUNTER
Dr David, the pt went to  her medication at the [facility], and they didn't give it to her.  It's a good distance from her home to the [facility].  The daughter called and asked if it can just be sent locally.  Her procedure is on Wednesday.  Thank you.

## 2018-09-04 ENCOUNTER — TELEPHONE (OUTPATIENT)
Dept: ELECTROPHYSIOLOGY | Facility: CLINIC | Age: 83
End: 2018-09-04

## 2018-09-04 NOTE — TELEPHONE ENCOUNTER
Spoke to daughterReanna.      CONFIRMED procedure arrival time of 11am, 3rd Floor SSCU  Reiterated instructions including:  -Directions to check in desk  -NPO after midnight night prior to procedure  -Confirmed that amiodarone has been held since 8/30/2018  -Confirmed that Eliquis has been held since 9/3/18   -Confirmed that Contrast Prep rx was picked up and daughter read back instructions for taking Contrast Prep Meds.  -Do not wear mascara day of procedure  -Bathe night prior and morning prior to procedure with Hibiclens solution or an antibacterial soap       Pt verbalizes understanding of above and appreciates call.

## 2018-09-05 ENCOUNTER — ANESTHESIA (OUTPATIENT)
Dept: MEDSURG UNIT | Facility: HOSPITAL | Age: 83
End: 2018-09-05
Payer: MEDICARE

## 2018-09-05 ENCOUNTER — ANESTHESIA EVENT (OUTPATIENT)
Dept: MEDSURG UNIT | Facility: HOSPITAL | Age: 83
End: 2018-09-05
Payer: MEDICARE

## 2018-09-05 ENCOUNTER — HOSPITAL ENCOUNTER (OUTPATIENT)
Facility: HOSPITAL | Age: 83
Discharge: HOME | End: 2018-09-06
Attending: INTERNAL MEDICINE | Admitting: INTERNAL MEDICINE
Payer: MEDICARE

## 2018-09-05 DIAGNOSIS — I47.10 SVT (SUPRAVENTRICULAR TACHYCARDIA): Primary | ICD-10-CM

## 2018-09-05 DIAGNOSIS — I49.5 SICK SINUS SYNDROME: ICD-10-CM

## 2018-09-05 DIAGNOSIS — I49.5 TACHY-BRADY SYNDROME: ICD-10-CM

## 2018-09-05 LAB
POC ACTIVATED CLOTTING TIME K: 318 SEC (ref 74–137)
SAMPLE: ABNORMAL

## 2018-09-05 PROCEDURE — 51798 US URINE CAPACITY MEASURE: CPT

## 2018-09-05 PROCEDURE — 93005 ELECTROCARDIOGRAM TRACING: CPT | Mod: 59

## 2018-09-05 PROCEDURE — 93653 COMPRE EP EVAL TX SVT: CPT | Mod: ,,, | Performed by: INTERNAL MEDICINE

## 2018-09-05 PROCEDURE — 27100006 EP LAB PROCEDURE

## 2018-09-05 PROCEDURE — C1894 INTRO/SHEATH, NON-LASER: HCPCS

## 2018-09-05 PROCEDURE — 63600175 PHARM REV CODE 636 W HCPCS: Performed by: NURSE ANESTHETIST, CERTIFIED REGISTERED

## 2018-09-05 PROCEDURE — 25000003 PHARM REV CODE 250: Performed by: NURSE PRACTITIONER

## 2018-09-05 PROCEDURE — 25000003 PHARM REV CODE 250

## 2018-09-05 PROCEDURE — D9220A PRA ANESTHESIA: Mod: ANES,,, | Performed by: ANESTHESIOLOGY

## 2018-09-05 PROCEDURE — 93010 ELECTROCARDIOGRAM REPORT: CPT | Mod: ,,, | Performed by: INTERNAL MEDICINE

## 2018-09-05 PROCEDURE — 37000008 HC ANESTHESIA 1ST 15 MINUTES: Performed by: INTERNAL MEDICINE

## 2018-09-05 PROCEDURE — 25500020 PHARM REV CODE 255

## 2018-09-05 PROCEDURE — 25000003 PHARM REV CODE 250: Performed by: NURSE ANESTHETIST, CERTIFIED REGISTERED

## 2018-09-05 PROCEDURE — C1731 CATH, EP, 20 OR MORE ELEC: HCPCS

## 2018-09-05 PROCEDURE — 63600175 PHARM REV CODE 636 W HCPCS

## 2018-09-05 PROCEDURE — 37000009 HC ANESTHESIA EA ADD 15 MINS: Performed by: INTERNAL MEDICINE

## 2018-09-05 PROCEDURE — 93623 PRGRMD STIMJ&PACG IV RX NFS: CPT | Mod: 26,,, | Performed by: INTERNAL MEDICINE

## 2018-09-05 PROCEDURE — D9220A PRA ANESTHESIA: Mod: CRNA,,, | Performed by: NURSE ANESTHETIST, CERTIFIED REGISTERED

## 2018-09-05 PROCEDURE — 93662 INTRACARDIAC ECG (ICE): CPT | Mod: 26,,, | Performed by: INTERNAL MEDICINE

## 2018-09-05 PROCEDURE — 25000003 PHARM REV CODE 250: Performed by: INTERNAL MEDICINE

## 2018-09-05 PROCEDURE — 93010 ELECTROCARDIOGRAM REPORT: CPT | Mod: 76,,, | Performed by: INTERNAL MEDICINE

## 2018-09-05 PROCEDURE — 93613 INTRACARDIAC EPHYS 3D MAPG: CPT | Mod: ,,, | Performed by: INTERNAL MEDICINE

## 2018-09-05 PROCEDURE — 93462 L HRT CATH TRNSPTL PUNCTURE: CPT | Mod: ,,, | Performed by: INTERNAL MEDICINE

## 2018-09-05 RX ORDER — FENTANYL CITRATE 50 UG/ML
50 INJECTION, SOLUTION INTRAMUSCULAR; INTRAVENOUS EVERY 5 MIN PRN
Status: CANCELLED | OUTPATIENT
Start: 2018-09-05

## 2018-09-05 RX ORDER — MEPERIDINE HYDROCHLORIDE 50 MG/ML
12.5 INJECTION INTRAMUSCULAR; INTRAVENOUS; SUBCUTANEOUS EVERY 10 MIN PRN
Status: CANCELLED | OUTPATIENT
Start: 2018-09-05 | End: 2018-09-05

## 2018-09-05 RX ORDER — PROPOFOL 10 MG/ML
VIAL (ML) INTRAVENOUS CONTINUOUS PRN
Status: DISCONTINUED | OUTPATIENT
Start: 2018-09-05 | End: 2018-09-05

## 2018-09-05 RX ORDER — HEPARIN SODIUM 1000 [USP'U]/ML
INJECTION, SOLUTION INTRAVENOUS; SUBCUTANEOUS
Status: DISCONTINUED | OUTPATIENT
Start: 2018-09-05 | End: 2018-09-05

## 2018-09-05 RX ORDER — AMLODIPINE BESYLATE 2.5 MG/1
2.5 TABLET ORAL 2 TIMES DAILY
Status: DISCONTINUED | OUTPATIENT
Start: 2018-09-06 | End: 2018-09-06 | Stop reason: HOSPADM

## 2018-09-05 RX ORDER — SODIUM CHLORIDE 9 MG/ML
INJECTION, SOLUTION INTRAVENOUS CONTINUOUS
Status: DISCONTINUED | OUTPATIENT
Start: 2018-09-05 | End: 2018-09-05

## 2018-09-05 RX ORDER — PROTAMINE SULFATE 10 MG/ML
INJECTION, SOLUTION INTRAVENOUS
Status: DISCONTINUED | OUTPATIENT
Start: 2018-09-05 | End: 2018-09-05

## 2018-09-05 RX ORDER — SODIUM CHLORIDE 0.9 % (FLUSH) 0.9 %
3 SYRINGE (ML) INJECTION
Status: DISCONTINUED | OUTPATIENT
Start: 2018-09-05 | End: 2018-09-06 | Stop reason: HOSPADM

## 2018-09-05 RX ORDER — PHENYLEPHRINE HYDROCHLORIDE 10 MG/ML
INJECTION INTRAVENOUS
Status: DISCONTINUED | OUTPATIENT
Start: 2018-09-05 | End: 2018-09-05

## 2018-09-05 RX ORDER — FENTANYL CITRATE 50 UG/ML
INJECTION, SOLUTION INTRAMUSCULAR; INTRAVENOUS
Status: DISCONTINUED | OUTPATIENT
Start: 2018-09-05 | End: 2018-09-05

## 2018-09-05 RX ORDER — LEVOTHYROXINE SODIUM 75 UG/1
75 TABLET ORAL
Status: DISCONTINUED | OUTPATIENT
Start: 2018-09-06 | End: 2018-09-06 | Stop reason: HOSPADM

## 2018-09-05 RX ORDER — CEFAZOLIN SODIUM 1 G/3ML
2 INJECTION, POWDER, FOR SOLUTION INTRAMUSCULAR; INTRAVENOUS
Status: DISCONTINUED | OUTPATIENT
Start: 2018-09-05 | End: 2018-09-05

## 2018-09-05 RX ORDER — LIDOCAINE HCL/PF 100 MG/5ML
SYRINGE (ML) INTRAVENOUS
Status: DISCONTINUED | OUTPATIENT
Start: 2018-09-05 | End: 2018-09-05

## 2018-09-05 RX ORDER — GLYCOPYRROLATE 0.2 MG/ML
INJECTION INTRAMUSCULAR; INTRAVENOUS
Status: DISCONTINUED | OUTPATIENT
Start: 2018-09-05 | End: 2018-09-05

## 2018-09-05 RX ORDER — SIMVASTATIN 40 MG/1
40 TABLET, FILM COATED ORAL NIGHTLY
Status: DISCONTINUED | OUTPATIENT
Start: 2018-09-05 | End: 2018-09-06 | Stop reason: HOSPADM

## 2018-09-05 RX ORDER — ACETAMINOPHEN 325 MG/1
325 TABLET ORAL EVERY 4 HOURS PRN
Status: DISCONTINUED | OUTPATIENT
Start: 2018-09-05 | End: 2018-09-06 | Stop reason: HOSPADM

## 2018-09-05 RX ORDER — MIDAZOLAM HYDROCHLORIDE 1 MG/ML
INJECTION, SOLUTION INTRAMUSCULAR; INTRAVENOUS
Status: DISCONTINUED | OUTPATIENT
Start: 2018-09-05 | End: 2018-09-05

## 2018-09-05 RX ORDER — IRBESARTAN 75 MG/1
150 TABLET ORAL 2 TIMES DAILY
Status: DISCONTINUED | OUTPATIENT
Start: 2018-09-06 | End: 2018-09-06 | Stop reason: HOSPADM

## 2018-09-05 RX ADMIN — MIDAZOLAM 1 MG: 1 INJECTION INTRAMUSCULAR; INTRAVENOUS at 01:09

## 2018-09-05 RX ADMIN — ISOPROTERENOL HYDROCHLORIDE 2 MCG/MIN: 0.2 INJECTION, SOLUTION INTRAMUSCULAR; INTRAVENOUS at 02:09

## 2018-09-05 RX ADMIN — PHENYLEPHRINE HYDROCHLORIDE 50 MCG: 10 INJECTION INTRAVENOUS at 02:09

## 2018-09-05 RX ADMIN — PROTAMINE SULFATE 20 MG: 10 INJECTION, SOLUTION INTRAVENOUS at 04:09

## 2018-09-05 RX ADMIN — FENTANYL CITRATE 50 MCG: 50 INJECTION, SOLUTION INTRAMUSCULAR; INTRAVENOUS at 01:09

## 2018-09-05 RX ADMIN — SODIUM CHLORIDE 1000 ML: 0.9 INJECTION, SOLUTION INTRAVENOUS at 11:09

## 2018-09-05 RX ADMIN — GLYCOPYRROLATE 0.2 MG: 0.2 INJECTION, SOLUTION INTRAMUSCULAR; INTRAVENOUS at 02:09

## 2018-09-05 RX ADMIN — LIDOCAINE HYDROCHLORIDE 75 MG: 20 INJECTION, SOLUTION INTRAVENOUS at 01:09

## 2018-09-05 RX ADMIN — SIMVASTATIN 40 MG: 40 TABLET, FILM COATED ORAL at 09:09

## 2018-09-05 RX ADMIN — HEPARIN SODIUM 10000 UNITS: 1000 INJECTION INTRAVENOUS; SUBCUTANEOUS at 03:09

## 2018-09-05 RX ADMIN — PROPOFOL 50 MCG/KG/MIN: 10 INJECTION, EMULSION INTRAVENOUS at 01:09

## 2018-09-05 RX ADMIN — PROTAMINE SULFATE 10 MG: 10 INJECTION, SOLUTION INTRAVENOUS at 03:09

## 2018-09-05 RX ADMIN — PHENYLEPHRINE HYDROCHLORIDE 100 MCG: 10 INJECTION INTRAVENOUS at 02:09

## 2018-09-05 NOTE — INTERVAL H&P NOTE
The patient has been examined and the H&P has been reviewed:    I concur with the findings and no changes have occurred since H&P was written.    Anesthesia/Surgery risks, benefits and alternative options discussed and understood by patient/family.      Active Hospital Problems    Diagnosis  POA    SVT (supraventricular tachycardia) [I47.1]  Yes      Resolved Hospital Problems   No resolved problems to display.

## 2018-09-05 NOTE — ANESTHESIA POSTPROCEDURE EVALUATION
"Anesthesia Post Evaluation    Patient: Treasure Rueda    Procedure(s) Performed: Procedure(s) (LRB):  ABLATION (N/A)    Final Anesthesia Type: general  Patient location during evaluation: PACU  Patient participation: Yes- Able to Participate  Level of consciousness: awake and alert and oriented  Post-procedure vital signs: reviewed and stable  Pain management: adequate  Airway patency: patent  PONV status at discharge: No PONV  Anesthetic complications: no      Cardiovascular status: stable  Respiratory status: unassisted, spontaneous ventilation and room air  Hydration status: euvolemic  Follow-up not needed.        Visit Vitals  BP (!) 148/76 (BP Location: Right arm, Patient Position: Lying)   Pulse 65   Temp 36.2 °C (97.1 °F) (Temporal)   Resp 18   Ht 5' 3" (1.6 m)   Wt 63.5 kg (140 lb)   SpO2 (!) 94%   Breastfeeding? No   BMI 24.80 kg/m²       Pain/Carolyn Score: Pain Assessment Performed: Yes (9/5/2018  5:00 PM)  Presence of Pain: denies (9/5/2018  5:00 PM)  Carolyn Score: 10 (9/5/2018  5:00 PM)        "

## 2018-09-05 NOTE — ANESTHESIA RELEASE NOTE
"Anesthesia Release from PACU Note    Patient: Treasure Rueda    Procedure(s) Performed: Procedure(s) (LRB):  ABLATION (N/A)    Anesthesia type: GEN    Post pain: Adequate analgesia reported    Post assessment: no apparent anesthetic complications, tolerated procedure well and no evidence of recall    Post vital signs: BP (!) 148/76 (BP Location: Right arm, Patient Position: Lying)   Pulse 65   Temp 36.2 °C (97.1 °F) (Temporal)   Resp 18   Ht 5' 3" (1.6 m)   Wt 63.5 kg (140 lb)   SpO2 (!) 94%   Breastfeeding? No   BMI 24.80 kg/m²     Level of consciousness: awake, alert and oriented    Nausea/Vomiting: no nausea/no vomiting    Complications: none    Airway Patency: patent    Respiratory: unassisted, spontaneous ventilation, room air    Cardiovascular: stable and blood pressure at baseline    Hydration: euvolemic    "

## 2018-09-05 NOTE — PLAN OF CARE
Problem: Patient Care Overview  Goal: Plan of Care Review  Outcome: Ongoing (interventions implemented as appropriate)  Patient arrived to room. PIV placed. Admit assessment completed. Plan of care discussed with patient. Dr Kirby aware of patient's elevated BP and of rash in groin area. Dr Kirby to bedside to evaluate. No new orders at this time. Will monitor

## 2018-09-05 NOTE — PROGRESS NOTES
Patient admitted to recovery see [facility] for complete assessment pacu bcg's maintained safety measures verified patient instructed on pain scale and patient verbalized understanding. Also called for patient's ekg and it was done and placed in patient's chart. Also called patient's daughter and updated on patient location with the permission of patient.

## 2018-09-05 NOTE — DISCHARGE INSTRUCTIONS
Patient is s/p SVT ablation on 18. She will need the followin. Do not strain or lift anything greater than 5 lb for 1 week.   2. Do not drive or operate any dangerous machinery for 24 hours.   3. Keep the dressing on, clean, and dry for 24 hours.   4. After 24 hours, the dressing may be removed and a shower is allowed.   5. Clean the area with mild soap and water.   6. Once the skin has healed (1 week), bathing in a tub or swimming is allowed.   7. Inspect the groin site daily and report to the physician any signs of infection at the site: redness, pain, fever >100.4, unusual pain at the access site or affected extremity, unusual swelling at the access site, or any yellow, white or green drainage.  Call 911 if you have:   Bleeding from the puncture site that you cannot stop by doing the following:   Relax and lie down right away. Keep your leg flat and apply firm pressure to the site using your fingers and a gauze pad. Keep the pressure on for 10 minutes. Continue this until the bleeding stops. This may take awhile. When bleeding stops, cover the site with a sterile bandage and keep your leg still as much as possible.  8. Follow up with Dr. Damir David in 6 weeks.

## 2018-09-05 NOTE — NURSING TRANSFER
Nursing Transfer Note      9/5/2018     Transfer To: sscu ([id number])      Transfer via stretcher    Transfer with cardiac monitoring    Transported by adán rn    Medicines sent: none    Chart send with patient: Yes    Notified: nurse    Patient reassessed at: see epic (date, time)    Upon arrival to floor: patient sent with hearing aid in right ear.

## 2018-09-05 NOTE — HPI
82 yoF AFL, SVT, CAD s/p PCI here for arrhythmia management. She has history of arrhythmia. Per notes there is AF as well as AFL. The only available ECGs and holter monitors show short RP tachycardia. A holter monitor 3/18 shows short RP tachycardia. She is symptomatic in her arrhythmia. She has been on sotalol until recently when she was switched to amiodarone. Apixaban has been used for CVA prophylaxis. Her referring cardiologist, Dr Panda, sent her for PM consideration as well as arrhythmia management.      Echo 1/18:  CONCLUSIONS     1 - Normal left ventricular systolic function (EF 55-60%).     2 - Indeterminate LV diastolic function.     3 - Normal right ventricular systolic function .      [facility] 12/14/17- PCI, mid RCA 99%

## 2018-09-05 NOTE — HOSPITAL COURSE
Patient presented for SVT ablation. Found to have left lateral wall pathway which was terminated with RFA. Tolerated the procedure well. Monitored overnight. Discharged home in stable condition. Discontinue Amiodarone. Follow up with Dr. Damir David in 6 weeks.

## 2018-09-05 NOTE — TRANSFER OF CARE
"Anesthesia Transfer of Care Note    Patient: Treasure Rueda    Procedure(s) Performed: Procedure(s) (LRB):  ABLATION (N/A)    Patient location: PACU    Anesthesia Type: general    Transport from OR: Transported from OR on 6-10 L/min O2 by face mask with adequate spontaneous ventilation    Post pain: adequate analgesia    Post assessment: no apparent anesthetic complications    Post vital signs: stable    Level of consciousness: awake    Nausea/Vomiting: no nausea/vomiting    Complications: none    Transfer of care protocol was followed      Last vitals:   Visit Vitals  BP (!) 123/95 (BP Location: Right arm, Patient Position: Lying)   Pulse 100   Temp 36.6 °C (97.9 °F) (Temporal)   Resp 16   Ht 5' 3" (1.6 m)   Wt 63.5 kg (140 lb)   SpO2 95%   Breastfeeding? No   BMI 24.80 kg/m²     "

## 2018-09-05 NOTE — ANESTHESIA PREPROCEDURE EVALUATION
09/05/2018  Treasure Rueda is a 82 y.o., female.    Anesthesia Evaluation    I have reviewed the Patient Summary Reports.    I have reviewed the Nursing Notes.   I have reviewed the Medications.     Review of Systems  Anesthesia Hx:  No problems with previous Anesthesia Hx of Anesthetic complications Psuedocholineesterase deficiency in the family (her mother).    Social:  Non-Smoker, No Alcohol Use    Hematology/Oncology:  Hematology Normal   Oncology Normal     EENT/Dental:EENT/Dental Normal   Cardiovascular:   Hypertension, well controlled CAD   CHF ECG has been reviewed. SVT   Pulmonary:   Shortness of breath    Renal/:  Renal/ Normal     Hepatic/GI:  Hepatic/GI Normal    Musculoskeletal:  Musculoskeletal Normal    Neurological:  Neurology Normal    Endocrine:   Hypothyroidism    Dermatological:  Skin Normal    Psych:  Psychiatric Normal         ECHo:    CONCLUSIONS     1 - Normal left ventricular systolic function (EF 55-60%).     2 - Indeterminate LV diastolic function.     3 - Normal right ventricular systolic function .     Physical Exam  General:  Well nourished    Airway/Jaw/Neck:  Airway Findings: Mouth Opening: Normal Tongue: Normal  General Airway Assessment: Adult  Mallampati: I  Improves to I with phonation.  TM Distance: Normal, at least 6 cm        Eyes/Ears/Nose:  EYES/EARS/NOSE FINDINGS: Normal   Dental:  DENTAL FINDINGS: Normal   Chest/Lungs:  Chest/Lungs Findings: Clear to auscultation, Normal Respiratory Rate     Heart/Vascular:  Heart Findings: Rate: Normal  Rhythm: Regular Rhythm  Sounds: Normal     Abdomen:  Abdomen Findings: Normal    Musculoskeletal:  Musculoskeletal Findings: Normal   Skin:  Skin Findings: Normal    Mental Status:  Mental Status Findings:  Cooperative, Alert and Oriented         Anesthesia Plan  Type of Anesthesia, risks & benefits  discussed:  Anesthesia Type:  general, MAC  Patient's Preference:   Intra-op Monitoring Plan: standard ASA monitors  Intra-op Monitoring Plan Comments:   Post Op Pain Control Plan: per primary service following discharge from PACU  Post Op Pain Control Plan Comments:   Induction:   IV  Beta Blocker:  Patient is not currently on a Beta-Blocker (No further documentation required).       Informed Consent: Patient understands risks and agrees with Anesthesia plan.  Questions answered. Anesthesia consent signed with patient.  ASA Score: 3     Day of Surgery Review of History & Physical:    H&P update referred to the surgeon.     Anesthesia Plan Notes: Patient is hard of hearing. She tells me that she is comfortable signing consent. She reads lips well.  Will avoid succinylcholine.         Ready For Surgery From Anesthesia Perspective.

## 2018-09-06 VITALS
BODY MASS INDEX: 24.8 KG/M2 | DIASTOLIC BLOOD PRESSURE: 53 MMHG | HEIGHT: 63 IN | RESPIRATION RATE: 18 BRPM | TEMPERATURE: 99 F | WEIGHT: 140 LBS | OXYGEN SATURATION: 94 % | SYSTOLIC BLOOD PRESSURE: 109 MMHG | HEART RATE: 60 BPM

## 2018-09-06 LAB
ANION GAP SERPL CALC-SCNC: 11 MMOL/L
BASOPHILS # BLD AUTO: 0.01 K/UL
BASOPHILS NFR BLD: 0.1 %
BUN SERPL-MCNC: 18 MG/DL
CALCIUM SERPL-MCNC: 9.2 MG/DL
CHLORIDE SERPL-SCNC: 110 MMOL/L
CO2 SERPL-SCNC: 21 MMOL/L
CREAT SERPL-MCNC: 1.2 MG/DL
DIFFERENTIAL METHOD: ABNORMAL
EOSINOPHIL # BLD AUTO: 0 K/UL
EOSINOPHIL NFR BLD: 0 %
ERYTHROCYTE [DISTWIDTH] IN BLOOD BY AUTOMATED COUNT: 13.5 %
EST. GFR  (AFRICAN AMERICAN): 49 ML/MIN/1.73 M^2
EST. GFR  (NON AFRICAN AMERICAN): 42.5 ML/MIN/1.73 M^2
GLUCOSE SERPL-MCNC: 162 MG/DL
HCT VFR BLD AUTO: 40.5 %
HGB BLD-MCNC: 12.6 G/DL
IMM GRANULOCYTES # BLD AUTO: 0.07 K/UL
IMM GRANULOCYTES NFR BLD AUTO: 0.8 %
INR PPP: 0.9
LYMPHOCYTES # BLD AUTO: 0.8 K/UL
LYMPHOCYTES NFR BLD: 8.5 %
MCH RBC QN AUTO: 31.8 PG
MCHC RBC AUTO-ENTMCNC: 31.1 G/DL
MCV RBC AUTO: 102 FL
MONOCYTES # BLD AUTO: 1.4 K/UL
MONOCYTES NFR BLD: 14.8 %
NEUTROPHILS # BLD AUTO: 6.9 K/UL
NEUTROPHILS NFR BLD: 75.8 %
NRBC BLD-RTO: 0 /100 WBC
PLATELET # BLD AUTO: 247 K/UL
PMV BLD AUTO: 10.8 FL
POTASSIUM SERPL-SCNC: 4.9 MMOL/L
PROTHROMBIN TIME: 9.7 SEC
RBC # BLD AUTO: 3.96 M/UL
SODIUM SERPL-SCNC: 142 MMOL/L
WBC # BLD AUTO: 9.1 K/UL

## 2018-09-06 PROCEDURE — 36415 COLL VENOUS BLD VENIPUNCTURE: CPT

## 2018-09-06 PROCEDURE — 80048 BASIC METABOLIC PNL TOTAL CA: CPT

## 2018-09-06 PROCEDURE — 25000003 PHARM REV CODE 250: Performed by: INTERNAL MEDICINE

## 2018-09-06 PROCEDURE — 85025 COMPLETE CBC W/AUTO DIFF WBC: CPT

## 2018-09-06 PROCEDURE — 85610 PROTHROMBIN TIME: CPT

## 2018-09-06 RX ADMIN — APIXABAN 5 MG: 2.5 TABLET, FILM COATED ORAL at 09:09

## 2018-09-06 RX ADMIN — AMLODIPINE BESYLATE 2.5 MG: 2.5 TABLET ORAL at 09:09

## 2018-09-06 RX ADMIN — IRBESARTAN 150 MG: 75 TABLET ORAL at 09:09

## 2018-09-06 RX ADMIN — LEVOTHYROXINE SODIUM 75 MCG: 75 TABLET ORAL at 05:09

## 2018-09-06 NOTE — PLAN OF CARE
Problem: Patient Care Overview  Goal: Plan of Care Review  Am assessment completed at this time. Patient resting quietly in bed. Family at bedside. Pt denies needs or complaints. drsg to bilateral groin cdi. Patient has no yet void since in and out cath overnight. No discomfort reported. Bladder scan of 350 mL reported to physician. No new orders at this time. Will continue to monitor.

## 2018-09-06 NOTE — DISCHARGE SUMMARY
[facility]  Cardiac Electrophysiology  Discharge Summary      Patient Name: Treasure Rueda  MRN: [id number]  Admission Date: 9/5/2018  Hospital Length of Stay: 0 days  Discharge Date and Time:  09/06/2018 7:37 AM  Attending Physician: Damir David MD    Discharging Provider: Walter Kirby MD  Primary Care Physician: Primary Doctor No    HPI:   82 yoF AFL, SVT, CAD s/p PCI here for arrhythmia management. She has history of arrhythmia. Per notes there is AF as well as AFL. The only available ECGs and holter monitors show short RP tachycardia. A holter monitor 3/18 shows short RP tachycardia. She is symptomatic in her arrhythmia. She has been on sotalol until recently when she was switched to amiodarone. Apixaban has been used for CVA prophylaxis. Her referring cardiologist, Dr Panda, sent her for PM consideration as well as arrhythmia management.      Echo 1/18:  CONCLUSIONS     1 - Normal left ventricular systolic function (EF 55-60%).     2 - Indeterminate LV diastolic function.     3 - Normal right ventricular systolic function .      [facility] 12/14/17- PCI, mid RCA 99%        Procedure(s) (LRB):  ABLATION (N/A)     Indwelling Lines/Drains at time of discharge:  Lines/Drains/Airways          None          Hospital Course:  Patient presented for SVT ablation. Found to have left lateral wall pathway which was terminated with RFA. Tolerated the procedure well. Monitored overnight. Discharged home in stable condition. Discontinue Amiodarone. Follow up with Dr. Damir David in 6 weeks.     Consults:     Significant Diagnostic Studies: Labs:   CMP No results for input(s): NA, K, CL, CO2, GLU, BUN, CREATININE, CALCIUM, PROT, ALBUMIN, BILITOT, ALKPHOS, AST, ALT, ANIONGAP, ESTGFRAFRICA, EGFRNONAA in the last 48 hours., CBC No results for input(s): WBC, HGB, HCT, PLT in the last 48 hours., INR   Lab Results   Component Value Date    INR 1.4 (H) 08/31/2018    INR 1.3 (H) 07/11/2018    INR 0.9  12/13/2017    and Lipid Panel   Lab Results   Component Value Date    CHOL 209 (H) 05/11/2017    HDL 44 05/11/2017    LDLCALC 127.0 05/11/2017    TRIG 190 (H) 05/11/2017    CHOLHDL 21.1 05/11/2017     Radiology: X-Ray: CXR: X-Ray Chest 1 View (CXR): No results found for this visit on 09/05/18.  Cardiac Graphics: ECG: sinus bradycardia and Echocardiogram: 2D echo with color flow doppler: No results found for this or any previous visit.    Pending Diagnostic Studies:     Procedure Component Value Units Date/Time    Basic metabolic panel [[id number]] Collected:  09/06/18 0432    Order Status:  Sent Lab Status:  In process Updated:  09/06/18 0647    Specimen:  Blood     CBC auto differential [[id number]] Collected:  09/06/18 0432    Order Status:  Sent Lab Status:  In process Updated:  09/06/18 0647    Specimen:  Blood     Protime-INR [[id number]] Collected:  09/06/18 0432    Order Status:  Sent Lab Status:  In process Updated:  09/06/18 0647    Specimen:  Blood           Final Active Diagnoses:    Diagnosis Date Noted POA    PRINCIPAL PROBLEM:  SVT (supraventricular tachycardia) [I47.1] 08/21/2018 Yes      Problems Resolved During this Admission:     No new Assessment & Plan notes have been filed under this hospital service since the last note was generated.  Service: Arrhythmia      Discharged Condition: good    Disposition: Home or Self Care    Follow Up:  Follow-up Information     Damir David MD In 6 weeks.    Specialties:  Electrophysiology, Cardiovascular Disease  Contact information:  [address] [place] [address] [id number] [phone]                 Patient Instructions:      Notify your health care provider if you experience any of the following:  increased confusion or weakness     Notify your health care provider if you experience any of the following:  persistent dizziness, light-headedness, or visual disturbances     Notify your health care provider if you experience any of the following:   worsening rash     Notify your health care provider if you experience any of the following:  severe persistent headache     Notify your health care provider if you experience any of the following:  difficulty breathing or increased cough     Notify your health care provider if you experience any of the following:  redness, tenderness, or signs of infection (pain, swelling, redness, odor or green/yellow discharge around incision site)     Notify your health care provider if you experience any of the following:  severe uncontrolled pain     Notify your health care provider if you experience any of the following:  persistent nausea and vomiting or diarrhea     Notify your health care provider if you experience any of the following:  temperature >100.4     No dressing needed     Medications:  Reconciled Home Medications:      Medication List      CHANGE how you take these medications    cetirizine 10 MG tablet  Commonly known as:  ZYRTEC  Take 1 tablet (10 mg total) by mouth once daily.  What changed:    · when to take this  · reasons to take this        CONTINUE taking these medications    acetaminophen 325 MG tablet  Commonly known as:  TYLENOL  Take 325 mg by mouth daily as needed for Pain.     alendronate 70 MG tablet  Commonly known as:  FOSAMAX  Take 1 tablet (70 mg total) by mouth every 7 days.     amLODIPine 2.5 MG tablet  Commonly known as:  NORVASC  Take 2.5 mg by mouth 2 (two) times daily.     apixaban 5 mg Tab  Take 1 tablet (5 mg total) by mouth 2 (two) times daily.     aspirin 81 MG EC tablet  Commonly known as:  ECOTRIN  Take 81 mg by mouth once daily.     AVAPRO 150 MG tablet  Generic drug:  irbesartan  Take 150 mg by mouth 2 (two) times daily.     cimetidine 400 MG tablet  Commonly known as:  TAGAMET  Take one tablet at 11pm on the night before procedure and 1 tablet at 6am and 12n on day of procedure     COD LIVER OIL ORAL  Take 1 capsule by mouth once daily.     diphenhydrAMINE 50 MG capsule  Commonly  known as:  BENADRYL  Take one tablet at 11pm on the night before procedure and 1 tablet at 6am and 12n on day of procedure     folic acid 400 MCG tablet  Commonly known as:  FOLVITE  Take 400 mcg by mouth once daily.     furosemide 40 MG tablet  Commonly known as:  LASIX  Take 1 tablet (40 mg total) by mouth every other day.     levothyroxine 75 MCG tablet  Commonly known as:  SYNTHROID  Take 1 tablet (75 mcg total) by mouth once daily.     lisinopril 40 MG tablet  Commonly known as:  PRINIVIL,ZESTRIL  Take 1 tablet (40 mg total) by mouth once daily.     predniSONE 50 MG Tab  Commonly known as:  DELTASONE  Take one tablet at 11pm on the night before procedure and 1 tablet at 6am and 12n on day of procedure     simvastatin 40 MG tablet  Commonly known as:  ZOCOR  Take 1 tablet (40 mg total) by mouth every evening.     vitamin D 1000 units Tab  Take 185 mg by mouth once daily.        STOP taking these medications    amiodarone 200 MG Tab  Commonly known as:  PACERONE     sotalol 80 MG tablet  Commonly known as:  BETAPACE     ticagrelor 90 mg tablet  Commonly known as:  BRILINTA            Time spent on the discharge of patient: 5 minutes  Follow up in 6 weeks with Dr. Frankie Kirby MD  Cardiac Electrophysiology  [facility]

## 2018-09-06 NOTE — NURSING
Patient discharged per MD orders. Instructions given on medications, wound care, activity, signs of infection, when to call MD, and follow up appointments. Pt verbalized understanding.  Patient AAOx3, VSS, no c/o pain or discomfort at this time. Telemetry and PIV removed. Patient awaiting discharge transport.

## 2018-09-06 NOTE — NURSING
Sutures removed from bilateral groin sites. No hematoma or bleeding. Daughter will remain at BS tonight (mom is very [place]) VS stable

## 2018-09-06 NOTE — NURSING
Dr. Buzz Rendon notified of patient not being able to urinate.  Bladder scan showed 482.  In and out cath done per MD order, 350cc out.  Will cont to monitor.

## 2018-10-22 ENCOUNTER — TELEPHONE (OUTPATIENT)
Dept: ELECTROPHYSIOLOGY | Facility: CLINIC | Age: 83
End: 2018-10-22

## 2018-10-22 DIAGNOSIS — I47.10 SVT (SUPRAVENTRICULAR TACHYCARDIA): Primary | ICD-10-CM

## 2018-10-23 ENCOUNTER — OFFICE VISIT (OUTPATIENT)
Dept: ELECTROPHYSIOLOGY | Facility: CLINIC | Age: 83
End: 2018-10-23
Payer: MEDICARE

## 2018-10-23 ENCOUNTER — HOSPITAL ENCOUNTER (OUTPATIENT)
Dept: CARDIOLOGY | Facility: CLINIC | Age: 83
Discharge: HOME | End: 2018-10-23
Payer: MEDICARE

## 2018-10-23 VITALS
HEART RATE: 64 BPM | BODY MASS INDEX: 25.08 KG/M2 | SYSTOLIC BLOOD PRESSURE: 138 MMHG | HEIGHT: 63 IN | DIASTOLIC BLOOD PRESSURE: 62 MMHG | WEIGHT: 141.56 LBS

## 2018-10-23 DIAGNOSIS — I10 ESSENTIAL HYPERTENSION: Chronic | ICD-10-CM

## 2018-10-23 DIAGNOSIS — I47.10 SVT (SUPRAVENTRICULAR TACHYCARDIA): Primary | ICD-10-CM

## 2018-10-23 DIAGNOSIS — I47.10 SVT (SUPRAVENTRICULAR TACHYCARDIA): ICD-10-CM

## 2018-10-23 PROCEDURE — 99213 OFFICE O/P EST LOW 20 MIN: CPT | Mod: PBBFAC,25 | Performed by: INTERNAL MEDICINE

## 2018-10-23 PROCEDURE — 99999 PR PBB SHADOW E&M-EST. PATIENT-LVL III: CPT | Mod: PBBFAC,,, | Performed by: INTERNAL MEDICINE

## 2018-10-23 PROCEDURE — 93010 ELECTROCARDIOGRAM REPORT: CPT | Mod: S$PBB,,, | Performed by: INTERNAL MEDICINE

## 2018-10-23 PROCEDURE — 99214 OFFICE O/P EST MOD 30 MIN: CPT | Mod: S$PBB,,, | Performed by: INTERNAL MEDICINE

## 2018-10-23 PROCEDURE — 93005 ELECTROCARDIOGRAM TRACING: CPT | Mod: PBBFAC | Performed by: INTERNAL MEDICINE

## 2018-10-23 NOTE — PROGRESS NOTES
Subjective:    Patient ID:  Treasure Rueda is a 82 y.o. female who presents for follow-up of SVT      82 yoF AFL, SVT, CAD s/p PCI here for arrhythmia management.     8/18: She has history of arrhythmia, per available notes there is AF as well as AFL. The only available ECGs and holter monitors show short RP tachycardia. A holter monitor 3/18 shows short RP tachycardia. She is symptomatic in her arrhythmia. She has been on sotalol until recently when she was switched to amiodarone. Apixaban has been used for CVA prophylaxis. Her referring cardiologist, Dr Panda, sent her for PM consideration as well as arrhythmia management.     Interval history: 9/5/18, LL AP ablation. Amiodarone was stopped on discharge. No PM implanted. No recurrent events.     Echo 1/18:  CONCLUSIONS     1 - Normal left ventricular systolic function (EF 55-60%).     2 - Indeterminate LV diastolic function.     3 - Normal right ventricular systolic function .     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, post-menopausal    Past Surgical History:  9/5/2018: ABLATION; N/A      Comment:  Procedure: ABLATION;  Surgeon: Damir David MD;                 Location: [facility] CATH LAB;  Service: Cardiology;                 Laterality: N/A;  SVT, SSS, RFA, OSCAR, +/- Dual PPM,                ______, Choice, MB, 3 Prep *Contrast Prep*  9/5/2018: ABLATION; N/A      Comment:  Performed by Damir David MD at [facility] CATH LAB  No date: APPENDECTOMY  No date: BLADDER SUSPENSION  No date: CORONARY ANGIOPLASTY WITH STENT PLACEMENT  No date: HYSTERECTOMY  12/14/2017: Left heart cath; Right      Comment:  Performed by  Clifton Panda MD at [facility] CATH LAB  No date: TONSILLECTOMY    Social History    Socioeconomic History      Marital status:       Spouse name: Not on file      Number of children: Not on file      Years of education: Not on file      Highest education level: Not on file    Social Needs      Financial resource strain: Not on file      Food insecurity - worry: Not on file      Food insecurity - inability: Not on file      Transportation needs - medical: Not on file      Transportation needs - non-medical: Not on file    Occupational History      Not on file    Tobacco Use      Smoking status: Never Smoker      Smokeless tobacco: Never Used    Substance and Sexual Activity      Alcohol use: Yes        Comment: 3-4 cans of beer daily      Drug use: No      Sexual activity: Not Currently    Other Topics      Concerns:        Not on file    Social History Narrative      [place]ives alone in [place] Her  is . She has 2 living children. Her other child  from [place]. She has 2 rental properties.      Review of patient's family history indicates:  Problem: Stroke      Relation: Mother          Age of Onset: (Not Specified)  Problem: Cancer      Relation: Mother          Age of Onset: (Not Specified)          Comment: breast cancer  Problem: Cancer      Relation: Father          Age of Onset: (Not Specified)  Problem: Multiple sclerosis      Relation: Daughter          Age of Onset: (Not Specified)  Problem: Cancer      Relation: Son          Age of Onset: (Not Specified)          Comment: colon and liver          Review of Systems   Constitution: Negative.   HENT: Negative.    Eyes: Negative.    Cardiovascular: Positive for irregular heartbeat and palpitations. Negative for chest pain, dyspnea on exertion, leg swelling, near-syncope and syncope.   Respiratory: Negative.  Negative for shortness of breath.    Endocrine: Negative.    Hematologic/Lymphatic: Negative.    Skin: Negative.    Musculoskeletal:  Negative.    Gastrointestinal: Negative.    Genitourinary: Negative.    Neurological: Negative.  Negative for dizziness and light-headedness.   Psychiatric/Behavioral: Negative.    Allergic/Immunologic: Negative.         Objective:    Physical Exam   Constitutional: She is oriented to person, place, and time. She appears well-developed and well-nourished. No distress.   HENT:   Head: Normocephalic and atraumatic.   Eyes: EOM are normal. Pupils are equal, round, and reactive to light.   Neck: Normal range of motion. No JVD present. No thyromegaly present.   Cardiovascular: Normal rate, regular rhythm, S1 normal, S2 normal and normal heart sounds. PMI is not displaced. Exam reveals no gallop and no friction rub.   No murmur heard.  Pulmonary/Chest: Effort normal and breath sounds normal. No respiratory distress. She has no wheezes. She has no rales.   Abdominal: Soft. Bowel sounds are normal. She exhibits no distension. There is no tenderness. There is no rebound and no guarding.   Musculoskeletal: Normal range of motion. She exhibits no edema or tenderness.   Neurological: She is alert and oriented to person, place, and time. No cranial nerve deficit.   Skin: Skin is warm and dry. No rash noted. No erythema.   Psychiatric: She has a normal mood and affect. Her behavior is normal. Judgment and thought content normal.   Vitals reviewed.    ECG: NSR  ms, nl QRS        Assessment:       1. SVT (supraventricular tachycardia)    2. Essential hypertension         Plan:       82 yoF SVT s/p ablation here for follow up. No recurrent symptomatic arrhythmia events. Will have her return in 6 months with holter prior.

## 2019-06-05 PROBLEM — R94.39 ABNORMAL FINDING ON CARDIOVASCULAR STRESS TEST: Status: ACTIVE | Noted: 2019-06-05

## 2019-06-20 PROBLEM — I48.0 PAROXYSMAL ATRIAL FIBRILLATION: Status: ACTIVE | Noted: 2019-06-20

## 2019-06-20 PROBLEM — I50.33 ACUTE ON CHRONIC DIASTOLIC CONGESTIVE HEART FAILURE: Status: ACTIVE | Noted: 2017-12-14

## 2019-12-11 ENCOUNTER — TELEPHONE (OUTPATIENT)
Dept: GASTROENTEROLOGY | Facility: CLINIC | Age: 84
End: 2019-12-11

## 2019-12-11 NOTE — TELEPHONE ENCOUNTER
Spoke with patient's daughter. She is seeing Cardiologist tomorrow and will discussed whether she can hold blood thinners    [phone]

## 2019-12-11 NOTE — TELEPHONE ENCOUNTER
----- Message from Vivian Hernandez MA sent at 12/11/2019  3:41 PM CST -----  Contact: [phone]  Reanna (daughter)   [phone]  Reanna (daughter)   Pt is needing to set up a ERCP. She was just discharged from the ED

## 2019-12-16 ENCOUNTER — TELEPHONE (OUTPATIENT)
Dept: ENDOSCOPY | Facility: HOSPITAL | Age: 84
End: 2019-12-16

## 2019-12-16 DIAGNOSIS — K80.50 COMMON BILE DUCT STONE: Primary | ICD-10-CM

## 2019-12-17 ENCOUNTER — TELEPHONE (OUTPATIENT)
Dept: GASTROENTEROLOGY | Facility: CLINIC | Age: 84
End: 2019-12-17

## 2019-12-17 NOTE — TELEPHONE ENCOUNTER
Spoke to Daughter Reanna .confirmed procedure date, Prep ,meds, allergies, medical clearance to hold Brilinta and Eliquis for EUS/ERCP . Received cardiac clearance from Dr. Milner to hold Brillinta and Eliquis for 3 days prior to procedure. Understanding verbalized Instructions sent via [employer].

## 2019-12-18 ENCOUNTER — TELEPHONE (OUTPATIENT)
Dept: ENDOSCOPY | Facility: HOSPITAL | Age: 84
End: 2019-12-18

## 2019-12-18 ENCOUNTER — TELEPHONE (OUTPATIENT)
Dept: GASTROENTEROLOGY | Facility: CLINIC | Age: 84
End: 2019-12-18

## 2019-12-18 NOTE — TELEPHONE ENCOUNTER
Spoke with patient about arrival time @ *.700     NPO status reviewed: Patient may eat until 7:00pm.  After 7pm, pt may have CLEAR liquids ONLY until completely NPO at Midnight.    Medications: Do not take Insulin or oral diabetic medications the day of the procedure.    Take as prescribed: heart, seizure and blood pressure medication in the morning with a sip of water (less than an ounce).  Take any breathing medications and bring inhalers to hospital with you.     Leave all valuables and jewelry at home. Wear comfortable clothes to procedure to change into hospital gown.   You cannot drive for 24 hours after your procedure because you will receive sedation for your procedure to make you comfortable.    A ride must be provided at discharge.

## 2019-12-18 NOTE — LETTER
[place] - Gastroenterology  [address]  [facility] [place] [address]  Phone: [phone]                 Treasure [place]   [place] [address]            Your Upper EUS/ERCP is scheduled for  7:00 am Friday 12/20/2019 with Dr Corbin Ford  at [facility] which is located at [address] [facility] [place] [address].  You will check in at the Hospital Admit Desk located on the 1st floor of the hospital. Please contact  the office two days before procedure date to reschedule if needed  [phone]     Upper Endoscopic Ultrasound     Endoscopic ultrasound(EUS) is a procedure used to image the digestive tract, including pancreas, lesions in esophagus and stomach.  It is used to diagnose and stage cancers of the digestive tract.  If necessary, your doctor may need to take samples during the procedure.     A responsible adult (family member or friend) must come with you and transport you home.  You are not allowed to drive, take a taxi or bus or leave the Endoscopy Center alone.  If you do not have a responsible adult with you to take you home, your exam will be cancelled.     If you have questions about the cost of your procedure, you should contact your insurance company as soon as possible.  Please bring a picture ID and your insurance card.  You will sign  treatment authorization forms at check in.  It is necessary for you to sign these forms again even if you recently signed these at the time of your clinic visit.     Please follow instructions of  if you are taking anticoagulant/blood thinning medications such as Aggrenox, aspirin, Brilinta, Effient, Eliquis, Lovenox, Plavix, Pletal, Pradaxa, Ticilid, Xarelto or Coumadin.        Per Dr Milner ; HOLD Brillinta & Eliquis x 3 days prior to procedures     Take blood pressure, heart, anti-rejection and or seizure medication at 600 am the morning of the procedure.     Skip your morning dose of insulin or other oral medications for  diabetes the morning of the procedure unless instructed otherwise by your doctor.       Day Before The Procedure     Eat a light evening meal and eat no solid food after 7 pm.  You may drink clear liquids including:     Water, Coffee or decaffeinated coffee (no milk or cream)   Tea, Herbal tea   Carbonated beverages (soft drinks), regular and sugar free   Gelatin   Apple Juice, grape juice, white cranberry juice   Gatorade, Power Aid, Crystal Light, Mohan Aid   Lemonade and Limeade   Bouillon, clear consomme'   Snowball, popsicles   DO NOT DRINK ANY LIQUIDS CONTAINING RED DYE   DO NOT DRINK ANY LIQUIDS NOT SPECIFICALLY LISTED   DO NOT DRINK ALCOHOL   NOTHING BY MOUTH AFTER MIDNIGHT     Day of Procedure     600 am take last dose of any medications you are allowed to take with a small amount of clear liquid     Thank you

## 2019-12-18 NOTE — TELEPHONE ENCOUNTER
Your Upper EUS/ERCP is scheduled for  7:00 am Friday 12/20/2019 with Dr Corbin Ford  at [facility] which is located at [address] [place] [address].  You will check in at the Hospital Admit Desk located on the 1st floor of the hospital. Please contact  the office two days before procedure date to reschedule if needed  [phone]     Upper Endoscopic Ultrasound     Endoscopic ultrasound(EUS) is a procedure used to image the digestive tract, including pancreas, lesions in esophagus and stomach.  It is used to diagnose and stage cancers of the digestive tract.  If necessary, your doctor may need to take samples during the procedure.     A responsible adult (family member or friend) must come with you and transport you home.  You are not allowed to drive, take a taxi or bus or leave the Endoscopy Center alone.  If you do not have a responsible adult with you to take you home, your exam will be cancelled.     If you have questions about the cost of your procedure, you should contact your insurance company as soon as possible.  Please bring a picture ID and your insurance card.  You will sign  treatment authorization forms at check in.  It is necessary for you to sign these forms again even if you recently signed these at the time of your clinic visit.     Please follow instructions of  if you are taking anticoagulant/blood thinning medications such as Aggrenox, aspirin, Brilinta, Effient, Eliquis, Lovenox, Plavix, Pletal, Pradaxa, Ticilid, Xarelto or Coumadin.        Per Dr Milner ; HOLD Brillinta & Eliquis x 3 days prior to procedures     Take blood pressure, heart, anti-rejection and or seizure medication at 600 am the morning of the procedure.     Skip your morning dose of insulin or other oral medications for diabetes the morning of the procedure unless instructed otherwise by your doctor.       Day Before The Procedure     Eat a light evening meal and eat no solid food after 7 pm.  You may  drink clear liquids including:     Water, Coffee or decaffeinated coffee (no milk or cream)   Tea, Herbal tea   Carbonated beverages (soft drinks), regular and sugar free   Gelatin   Apple Juice, grape juice, white cranberry juice   Gatorade, Power Aid, Crystal Light, Mohan Aid   Lemonade and Limeade   Bouillon, clear consomme'   Snowball, popsicles   DO NOT DRINK ANY LIQUIDS CONTAINING RED DYE   DO NOT DRINK ANY LIQUIDS NOT SPECIFICALLY LISTED   DO NOT DRINK ALCOHOL   NOTHING BY MOUTH AFTER MIDNIGHT     Day of Procedure     600 am take last dose of any medications you are allowed to take with a small amount of clear liquid     Thank you

## 2019-12-20 ENCOUNTER — TELEPHONE (OUTPATIENT)
Dept: SURGERY | Facility: CLINIC | Age: 84
End: 2019-12-20

## 2019-12-20 ENCOUNTER — ANESTHESIA EVENT (OUTPATIENT)
Dept: ENDOSCOPY | Facility: HOSPITAL | Age: 84
End: 2019-12-20
Payer: MEDICARE

## 2019-12-20 ENCOUNTER — HOSPITAL ENCOUNTER (OUTPATIENT)
Facility: HOSPITAL | Age: 84
Discharge: HOME | End: 2019-12-20
Attending: INTERNAL MEDICINE | Admitting: INTERNAL MEDICINE
Payer: MEDICARE

## 2019-12-20 ENCOUNTER — ANESTHESIA (OUTPATIENT)
Dept: ENDOSCOPY | Facility: HOSPITAL | Age: 84
End: 2019-12-20
Payer: MEDICARE

## 2019-12-20 VITALS
BODY MASS INDEX: 23.74 KG/M2 | WEIGHT: 134 LBS | DIASTOLIC BLOOD PRESSURE: 69 MMHG | SYSTOLIC BLOOD PRESSURE: 148 MMHG | OXYGEN SATURATION: 100 % | HEART RATE: 71 BPM | RESPIRATION RATE: 18 BRPM | HEIGHT: 63 IN | TEMPERATURE: 98 F

## 2019-12-20 DIAGNOSIS — K80.50 CHOLEDOCHOLITHIASIS: Primary | ICD-10-CM

## 2019-12-20 PROCEDURE — 37000008 HC ANESTHESIA 1ST 15 MINUTES: Performed by: INTERNAL MEDICINE

## 2019-12-20 PROCEDURE — 43264 PR ERCP,W/REMOVAL STONE,BIL/PANCR DUCTS: ICD-10-PCS | Mod: ,,, | Performed by: INTERNAL MEDICINE

## 2019-12-20 PROCEDURE — 27201674 HC SPHINCTERTOME: Performed by: INTERNAL MEDICINE

## 2019-12-20 PROCEDURE — C1726 CATH, BAL DIL, NON-VASCULAR: HCPCS | Performed by: INTERNAL MEDICINE

## 2019-12-20 PROCEDURE — 43264 ERCP REMOVE DUCT CALCULI: CPT | Mod: ,,, | Performed by: INTERNAL MEDICINE

## 2019-12-20 PROCEDURE — 37000009 HC ANESTHESIA EA ADD 15 MINS: Performed by: INTERNAL MEDICINE

## 2019-12-20 PROCEDURE — 25500020 PHARM REV CODE 255: Performed by: INTERNAL MEDICINE

## 2019-12-20 PROCEDURE — 74328 X-RAY BILE DUCT ENDOSCOPY: CPT | Mod: 26,,, | Performed by: INTERNAL MEDICINE

## 2019-12-20 PROCEDURE — 63600175 PHARM REV CODE 636 W HCPCS: Performed by: NURSE ANESTHETIST, CERTIFIED REGISTERED

## 2019-12-20 PROCEDURE — 74328 PR  X-RAY FOR BILE DUCT ENDOSCOPY: ICD-10-PCS | Mod: 26,,, | Performed by: INTERNAL MEDICINE

## 2019-12-20 PROCEDURE — 63600175 PHARM REV CODE 636 W HCPCS: Performed by: INTERNAL MEDICINE

## 2019-12-20 PROCEDURE — 43259 EGD US EXAM DUODENUM/JEJUNUM: CPT | Performed by: INTERNAL MEDICINE

## 2019-12-20 PROCEDURE — 25000003 PHARM REV CODE 250: Performed by: INTERNAL MEDICINE

## 2019-12-20 PROCEDURE — 43277 PR ERCP W/BALOON DILATION BILIARY/PANCREATIC DUCT/AMPULLA: ICD-10-PCS | Mod: 59,,, | Performed by: INTERNAL MEDICINE

## 2019-12-20 PROCEDURE — 25000003 PHARM REV CODE 250: Performed by: NURSE ANESTHETIST, CERTIFIED REGISTERED

## 2019-12-20 PROCEDURE — 43259 EGD US EXAM DUODENUM/JEJUNUM: CPT | Mod: 51,,, | Performed by: INTERNAL MEDICINE

## 2019-12-20 PROCEDURE — 43259 PR ENDOSCOPIC ULTRASOUND EXAM: ICD-10-PCS | Mod: 51,,, | Performed by: INTERNAL MEDICINE

## 2019-12-20 PROCEDURE — 43264 ERCP REMOVE DUCT CALCULI: CPT | Performed by: INTERNAL MEDICINE

## 2019-12-20 PROCEDURE — 43277 ERCP EA DUCT/AMPULLA DILATE: CPT | Mod: 59,,, | Performed by: INTERNAL MEDICINE

## 2019-12-20 PROCEDURE — 27202125 HC BALLOON, EXTRACTION (ANY): Performed by: INTERNAL MEDICINE

## 2019-12-20 PROCEDURE — 43262 ENDO CHOLANGIOPANCREATOGRAPH: CPT | Performed by: INTERNAL MEDICINE

## 2019-12-20 RX ORDER — PROPOFOL 10 MG/ML
VIAL (ML) INTRAVENOUS
Status: DISCONTINUED | OUTPATIENT
Start: 2019-12-20 | End: 2019-12-20

## 2019-12-20 RX ORDER — LIDOCAINE HCL/PF 100 MG/5ML
SYRINGE (ML) INTRAVENOUS
Status: DISCONTINUED | OUTPATIENT
Start: 2019-12-20 | End: 2019-12-20

## 2019-12-20 RX ORDER — SODIUM CHLORIDE 9 MG/ML
INJECTION, SOLUTION INTRAVENOUS CONTINUOUS
Status: DISCONTINUED | OUTPATIENT
Start: 2019-12-20 | End: 2019-12-20 | Stop reason: HOSPADM

## 2019-12-20 RX ORDER — PROPOFOL 10 MG/ML
VIAL (ML) INTRAVENOUS CONTINUOUS PRN
Status: DISCONTINUED | OUTPATIENT
Start: 2019-12-20 | End: 2019-12-20

## 2019-12-20 RX ORDER — GLYCOPYRROLATE 0.2 MG/ML
INJECTION INTRAMUSCULAR; INTRAVENOUS
Status: DISCONTINUED | OUTPATIENT
Start: 2019-12-20 | End: 2019-12-20

## 2019-12-20 RX ORDER — SODIUM CHLORIDE 0.9 % (FLUSH) 0.9 %
10 SYRINGE (ML) INJECTION
Status: DISCONTINUED | OUTPATIENT
Start: 2019-12-20 | End: 2019-12-20 | Stop reason: HOSPADM

## 2019-12-20 RX ORDER — SODIUM CHLORIDE 9 MG/ML
INJECTION, SOLUTION INTRAVENOUS CONTINUOUS PRN
Status: DISCONTINUED | OUTPATIENT
Start: 2019-12-20 | End: 2019-12-20

## 2019-12-20 RX ORDER — INDOMETHACIN 50 MG/1
100 SUPPOSITORY RECTAL ONCE
Status: COMPLETED | OUTPATIENT
Start: 2019-12-20 | End: 2019-12-20

## 2019-12-20 RX ADMIN — TOPICAL ANESTHETIC 1 EACH: 200 SPRAY DENTAL; PERIODONTAL at 08:12

## 2019-12-20 RX ADMIN — PROPOFOL 50 MG: 10 INJECTION, EMULSION INTRAVENOUS at 08:12

## 2019-12-20 RX ADMIN — SODIUM CHLORIDE: 9 INJECTION, SOLUTION INTRAVENOUS at 08:12

## 2019-12-20 RX ADMIN — INDOMETHACIN 100 MG: 50 SUPPOSITORY RECTAL at 08:12

## 2019-12-20 RX ADMIN — LIDOCAINE HYDROCHLORIDE 60 MG: 20 INJECTION, SOLUTION INTRAVENOUS at 08:12

## 2019-12-20 RX ADMIN — PROPOFOL 100 MCG/KG/MIN: 10 INJECTION, EMULSION INTRAVENOUS at 08:12

## 2019-12-20 RX ADMIN — IOHEXOL 10 ML: 300 INJECTION, SOLUTION INTRAVENOUS at 08:12

## 2019-12-20 RX ADMIN — SODIUM CHLORIDE 20 ML/HR: 0.9 INJECTION, SOLUTION INTRAVENOUS at 07:12

## 2019-12-20 RX ADMIN — GLYCOPYRROLATE 0.1 MG: 0.2 INJECTION, SOLUTION INTRAMUSCULAR; INTRAVENOUS at 08:12

## 2019-12-20 NOTE — PROVATION PATIENT INSTRUCTIONS
Discharge Summary/Instructions after an Endoscopic Procedure  Patient Name: Treasure Rueda  Patient MRN: [id number]  Patient YOB: 1935 Friday, December 20, 2019  eMredith Ford MD  RESTRICTIONS:  During your procedure today, you received medications for sedation.  These   medications may affect your judgment, balance and coordination.  Therefore,   for 24 hours, you have the following restrictions:   - DO NOT drive a car, operate machinery, make legal/financial decisions,   sign important papers or drink alcohol.    ACTIVITY:  Today: no heavy lifting, straining or running due to procedural   sedation/anesthesia.  The following day: return to full activity including work.  DIET:  Eat and drink normally unless instructed otherwise.     TREATMENT FOR COMMON SIDE EFFECTS:  - Mild abdominal pain, nausea, belching, bloating or excessive gas:  rest,   eat lightly and use a heating pad.  - Sore Throat: treat with throat lozenges and/or gargle with warm salt   water.  - Because air was used during the procedure, expelling large amounts of air   from your rectum or belching is normal.  - If a bowel prep was taken, you may not have a bowel movement for 1-3 days.    This is normal.  SYMPTOMS TO WATCH FOR AND REPORT TO YOUR PHYSICIAN:  1. Abdominal pain or bloating, other than gas cramps.  2. Chest pain.  3. Back pain.  4. Signs of infection such as: chills or fever occurring within 24 hours   after the procedure.  5. Rectal bleeding, which would show as bright red, maroon, or black stools.   (A tablespoon of blood from the rectum is not serious, especially if   hemorrhoids are present.)  6. Vomiting.  7. Weakness or dizziness.  GO DIRECTLY TO THE NEAREST EMERGENCY ROOM IF YOU HAVE ANY OF THE FOLLOWING:      Difficulty breathing              Chills and/or fever over 101 F   Persistent vomiting and/or vomiting blood   Severe abdominal pain   Severe chest pain   Black, tarry stools   Bleeding- more than  one tablespoon   Any other symptom or condition that you feel may need urgent attention  Your doctor recommends these additional instructions:  If any biopsies were taken, your doctors clinic will contact you in 1 to 2   weeks with any results.  - Discharge patient to home.   - Resume previous diet.   - Continue present medications.   - Return to referring physician.   - Patient has a contact number available for emergencies.  The signs and   symptoms of potential delayed complications were discussed with the   patient.  Return to normal activities tomorrow.  Written discharge   instructions were provided to the patient.   - Scheduled to see surgery for cholecystectomy.  For questions, problems or results please call your physician - Meredith Ford MD at Work:  [phone].  EMERGENCY PHONE NUMBER: [phone],  LAB RESULTS: [phone]  IF A COMPLICATION OR EMERGENCY SITUATION ARISES AND YOU ARE UNABLE TO REACH   YOUR PHYSICIAN - GO DIRECTLY TO THE EMERGENCY ROOM.  Meredith Ford MD  12/20/2019 9:10:22 AM  This report has been verified and signed electronically.  [employer]ATION

## 2019-12-20 NOTE — PROVATION PATIENT INSTRUCTIONS
Discharge Summary/Instructions after an Endoscopic Procedure  Patient Name: Treasure Rueda  Patient MRN: [id number]  Patient YOB: 1935 Friday, December 20, 2019  Meredith Ford MD  RESTRICTIONS:  During your procedure today, you received medications for sedation.  These   medications may affect your judgment, balance and coordination.  Therefore,   for 24 hours, you have the following restrictions:   - DO NOT drive a car, operate machinery, make legal/financial decisions,   sign important papers or drink alcohol.    ACTIVITY:  Today: no heavy lifting, straining or running due to procedural   sedation/anesthesia.  The following day: return to full activity including work.  DIET:  Eat and drink normally unless instructed otherwise.     TREATMENT FOR COMMON SIDE EFFECTS:  - Mild abdominal pain, nausea, belching, bloating or excessive gas:  rest,   eat lightly and use a heating pad.  - Sore Throat: treat with throat lozenges and/or gargle with warm salt   water.  - Because air was used during the procedure, expelling large amounts of air   from your rectum or belching is normal.  - If a bowel prep was taken, you may not have a bowel movement for 1-3 days.    This is normal.  SYMPTOMS TO WATCH FOR AND REPORT TO YOUR PHYSICIAN:  1. Abdominal pain or bloating, other than gas cramps.  2. Chest pain.  3. Back pain.  4. Signs of infection such as: chills or fever occurring within 24 hours   after the procedure.  5. Rectal bleeding, which would show as bright red, maroon, or black stools.   (A tablespoon of blood from the rectum is not serious, especially if   hemorrhoids are present.)  6. Vomiting.  7. Weakness or dizziness.  GO DIRECTLY TO THE NEAREST EMERGENCY ROOM IF YOU HAVE ANY OF THE FOLLOWING:      Difficulty breathing              Chills and/or fever over 101 F   Persistent vomiting and/or vomiting blood   Severe abdominal pain   Severe chest pain   Black, tarry stools   Bleeding- more than  one tablespoon   Any other symptom or condition that you feel may need urgent attention  Your doctor recommends these additional instructions:  If any biopsies were taken, your doctors clinic will contact you in 1 to 2   weeks with any results.  - Proceed with ERCP.  - Resume previous diet.   - Continue present medications.   - Return to referring physician.  For questions, problems or results please call your physician - Meredith Ford MD at Work:  [phone].  EMERGENCY PHONE NUMBER: [phone],  LAB RESULTS: [phone]  IF A COMPLICATION OR EMERGENCY SITUATION ARISES AND YOU ARE UNABLE TO REACH   YOUR PHYSICIAN - GO DIRECTLY TO THE EMERGENCY ROOM.  Meredith Ford MD  12/20/2019 9:07:02 AM  This report has been verified and signed electronically.  [employer]ATION

## 2019-12-20 NOTE — ANESTHESIA PREPROCEDURE EVALUATION
12/20/2019  Treasure Rueda is a 84 y.o., female for upper EUS and ERCP under MAC/GA    Past Medical History:   Diagnosis Date    Anticoagulant long-term use     Atrial flutter     Stent place in December 2017    CAD (coronary artery disease)     with stent placement    Hearing loss of both ears     Hyperlipidemia     Hypertension     Hypothyroidism     Osteoporosis, post-menopausal      Past Surgical History:   Procedure Laterality Date    ABLATION N/A 9/5/2018    Procedure: ABLATION;  Surgeon: Damir David MD;  Location: [facility] CATH LAB;  Service: Cardiology;  Laterality: N/A;  SVT, SSS, RFA, OSCAR, +/- Dual PPM, ______, Choice, MB, 3 Prep *Contrast Prep*    APPENDECTOMY      BLADDER SUSPENSION      CORONARY ANGIOPLASTY WITH STENT PLACEMENT      HYSTERECTOMY      INSERTION OF IMPLANTABLE LOOP RECORDER N/A 8/26/2019    Procedure: Insertion, Implantable Loop Recorder;  Surgeon: Clifton Panda MD;  Location: [facility] CATH LAB;  Service: Cardiology;  Laterality: N/A;    LEFT HEART CATHETERIZATION Left 6/20/2019    Procedure: Left heart cath;  Surgeon: Clifton Panda MD;  Location: [facility] CATH LAB;  Service: Cardiology;  Laterality: Left;    TONSILLECTOMY           Pre-op Assessment    I have reviewed the Patient Summary Reports.     I have reviewed the Nursing Notes.   I have reviewed the Medications.     Review of Systems  Anesthesia Hx:  No problems with previous Anesthesia Hx of Anesthetic complications Psuedocholineesterase deficiency in the family (her mother).    Social:  Former Smoker, No Alcohol Use    Cardiovascular:   Hypertension, well controlled CAD  CABG/stent (stent 12/2017)  CHF hyperlipidemia ECG has been reviewed. SVT hx   Renal/:  Renal/ Normal     Hepatic/GI:   CBD stone   Neurological:  Neurology Normal    Endocrine:   Hypothyroidism       ECHo:    CONCLUSIONS     1 - Normal left ventricular systolic function (EF 55-60%).     2 - Indeterminate LV diastolic function.     3 - Normal right ventricular systolic function .     Physical Exam  General:  Well nourished    Airway/Jaw/Neck:  Airway Findings: Mouth Opening: Normal Tongue: Normal  General Airway Assessment: Adult  Mallampati: I  Improves to I with phonation.  TM Distance: Normal, at least 6 cm      Dental:  Dental Findings: Periodontal disease, Severe   Chest/Lungs:  Chest/Lungs Findings: Clear to auscultation, Normal Respiratory Rate     Heart/Vascular:  Heart Findings: Rate: Normal  Rhythm: Regular Rhythm  Sounds: Normal        Mental Status:  Mental Status Findings:  Cooperative, Alert and Oriented       Lab Results   Component Value Date    WBC 10.32 12/11/2019    HGB 13.3 12/11/2019    HCT 41.3 12/11/2019     12/11/2019    CHOL 209 (H) 05/11/2017    TRIG 190 (H) 05/11/2017    HDL 44 05/11/2017    ALT 13 12/11/2019    AST 19 12/11/2019     12/11/2019    K 4.5 12/11/2019     12/11/2019    CREATININE 0.86 12/11/2019    BUN 11 12/11/2019    CO2 26 12/11/2019    TSH 2.680 01/17/2018    INR 0.9 09/06/2018    HGBA1C 6.2 05/11/2017       CONCLUSIONS     1 - Normal left ventricular systolic function (EF 55-60%).     2 - Indeterminate LV diastolic function.     3 - Normal right ventricular systolic function .   his document has been electronically    SIGNED BY: Clifton Panda MD On: 01/08/2018 09:05      Normal sinus rhythm  Nonspecific T wave abnormality  Abnormal ECG  When compared with ECG of 20-JUN-2019 14:04,  Premature atrial complexes are no longer Present  Confirmed by Rodo Frias III, MD ([id number]) on 12/11/2019 6:00:02 PM    Anesthesia Plan  Type of Anesthesia, risks & benefits discussed:  Anesthesia Type:  general, MAC  Patient's Preference:   Intra-op Monitoring Plan: standard ASA monitors  Intra-op Monitoring Plan Comments:   Post Op Pain Control Plan: per  primary service following discharge from PACU  Post Op Pain Control Plan Comments:   Induction:   IV  Beta Blocker:  Patient is not currently on a Beta-Blocker (No further documentation required).       Informed Consent: Patient understands risks and agrees with Anesthesia plan.  Questions answered. Anesthesia consent signed with patient.  ASA Score: 3     Day of Surgery Review of History & Physical:    H&P update referred to the surgeon.     Anesthesia Plan Notes: Patient is hard of hearing. She tells me that she is comfortable signing consent. She reads lips well.  Will avoid succinylcholine.         Ready For Surgery From Anesthesia Perspective.

## 2019-12-20 NOTE — DISCHARGE INSTRUCTIONS
Discharge Instructions for ERCP (Endoscopic Retrograde Cholangiopancreatography)  You had a procedure known as an ERCP. Your healthcare provider performed the ERCP to look at your bile or pancreatic ducts, and to locate and treat blockages in the ducts. This procedure is used to diagnose diseases of the pancreas, bile ducts, and pancreatic duct, liver, and gallbladder. Heres what you need to do following your ERCP.  Home care  · Dont take aspirin or any other blood-thinning medicines (anticoagulants) until your provider says its OK.  · Your provider may prescribe an antibiotic, depending on what was done during the ERCP.  · You may have a sore throat for 1 to 2 days after the procedure. Use lozenges or gargle with salt water for your sore throat. If you're not better in a few days, call your provider.  · Rest, drink fluids, and eat light meals. If you feel bloated or have too much gas, use a heating pad on your belly to help reduce the discomfort. This should help you feel better. But if it doesn't, call your provider.  · Dont drink alcohol for 2 days after the procedure.  Follow-up care  Make a follow-up appointment as directed by our staff.     When to seek medical care  Call your provider right away if you have any of the following:  · Trouble swallowing or throat pain that gets worse   · Chest pain or severe belly or abdominal pain  · Fever above 100°F (37.7°C) or chills  · Upset stomach (nausea) and vomiting  · Black or tarry stools   Date Last Reviewed: 6/13/2015  © 1425-0084 [employer]. [address] [place] [address], [place] [address]. All rights reserved. This information is not intended as a substitute for professional medical care. Always follow your healthcare professional's instructions.

## 2019-12-20 NOTE — TRANSFER OF CARE
"Anesthesia Transfer of Care Note    Patient: Treasure Rueda    Procedure(s) Performed: Procedure(s) (LRB):  ULTRASOUND, UPPER GI TRACT, ENDOSCOPIC (N/A)  ERCP (ENDOSCOPIC RETROGRADE CHOLANGIOPANCREATOGRAPHY) (N/A)    Patient location: GI    Anesthesia Type: MAC    Transport from OR: Transported from OR on room air with adequate spontaneous ventilation    Post pain: adequate analgesia    Post assessment: no apparent anesthetic complications and tolerated procedure well    Post vital signs: stable    Level of consciousness: awake, alert and oriented    Nausea/Vomiting: no nausea/vomiting    Complications: none    Transfer of care protocol was followed      Last vitals:   Visit Vitals  /71   Pulse 87   Temp 36.6 °C (97.9 °F) (Temporal)   Resp 18   Ht 5' 3" (1.6 m)   Wt 60.8 kg (134 lb)   SpO2 100%   Breastfeeding? No   BMI 23.74 kg/m²     "

## 2019-12-20 NOTE — TELEPHONE ENCOUNTER
12/20/2019         1412  Contacted patient regarding rescheduling her appointment on 12/24/2019. Spoke to Reanna, patient's daughter, due to the patient being hard of hearing and just being released from the hospital for a procedure. Informed Reanna that the patient's appointment will have to be rescheduled due to Dr. Crocker being in surgery. Appointment was rescheduled to 01/08/2020 at 900. Confirmed new appointment date and time. Reanna verbalized understanding.

## 2019-12-20 NOTE — ANESTHESIA POSTPROCEDURE EVALUATION
Anesthesia Post Evaluation    Patient: Treasure Rueda    Procedure(s) Performed: Procedure(s) (LRB):  ULTRASOUND, UPPER GI TRACT, ENDOSCOPIC (N/A)  ERCP (ENDOSCOPIC RETROGRADE CHOLANGIOPANCREATOGRAPHY) (N/A)    Final Anesthesia Type: MAC    Patient location during evaluation: GI PACU  Patient participation: Yes- Able to Participate  Level of consciousness: awake and alert and oriented  Post-procedure vital signs: reviewed and stable  Pain management: adequate  Airway patency: patent    PONV status at discharge: No PONV  Anesthetic complications: no      Cardiovascular status: blood pressure returned to baseline, hemodynamically stable and stable  Respiratory status: unassisted and spontaneous ventilation  Hydration status: euvolemic  Follow-up not needed.          Vitals Value Taken Time   /70 12/20/2019  7:32 AM   Temp 36.6 °C (97.9 °F) 12/20/2019  7:32 AM   Pulse 87 12/20/2019  7:32 AM   Resp 18 12/20/2019  7:32 AM   SpO2 100 % 12/20/2019  7:32 AM         No case tracking events are documented in the log.      Pain/Carolyn Score: Carolyn Score: 9 (12/20/2019  8:54 AM)

## 2019-12-20 NOTE — H&P
History & Physical - Short Stay  Gastroenterology      SUBJECTIVE:     Procedure: EUS and ERCP    Chief Complaint/Indication for Procedure: CBD stones    History of Present Illness:  Patient is a 84 y.o. female with CBD stones coming for EUS and ERCP.     PTA Medications   Medication Sig    acetaminophen (TYLENOL) 325 MG tablet Take 325 mg by mouth daily as needed for Pain.    furosemide (LASIX) 40 MG tablet Take 1 tablet (40 mg total) by mouth every other day.    alendronate (FOSAMAX) 70 MG tablet Take 1 tablet (70 mg total) by mouth every 7 days.    apixaban (ELIQUIS) 5 mg Tab Take 5 mg by mouth 2 (two) times daily.    COD LIVER OIL ORAL Take 1 capsule by mouth once daily.     folic acid (FOLVITE) 400 MCG tablet Take 400 mcg by mouth once daily.    irbesartan (AVAPRO) 150 MG tablet Take 1 tablet (150 mg total) by mouth once daily.    levothyroxine (SYNTHROID) 75 MCG tablet Take 1 tablet (75 mcg total) by mouth once daily.    rosuvastatin (CRESTOR) 40 MG Tab Take 10 mg by mouth every evening.    spironolactone (ALDACTONE) 25 MG tablet Take 25 mg by mouth once daily.    ticagrelor (BRILINTA) 90 mg tablet Take 1 tablet (90 mg total) by mouth 2 (two) times daily.    vitamin D 185 MG Tab Take 185 mg by mouth once daily.       Review of patient's allergies indicates:   Allergen Reactions    Iodine and iodide containing products Nausea And Vomiting, Swelling and Rash    Shellfish containing products Diarrhea    Anectine [succinylcholine chloride]      Family history of issues (multiple family members)    Fish containing products     Pork/porcine containing products     Squash         Past Medical History:   Diagnosis Date    Anticoagulant long-term use     Atrial flutter     Stent place in December 2017    CAD (coronary artery disease)     with stent placement    Hearing loss of both ears     Hyperlipidemia     Hypertension     Hypothyroidism     Osteoporosis, post-menopausal      Past Surgical  History:   Procedure Laterality Date    ABLATION N/A 2018    Procedure: ABLATION;  Surgeon: Damir David MD;  Location: [facility] CATH LAB;  Service: Cardiology;  Laterality: N/A;  SVT, SSS, RFA, OSCAR, +/- Dual PPM, ______, Choice, MB, 3 Prep *Contrast Prep*    APPENDECTOMY      BLADDER SUSPENSION      CORONARY ANGIOPLASTY WITH STENT PLACEMENT      HYSTERECTOMY      INSERTION OF IMPLANTABLE LOOP RECORDER N/A 2019    Procedure: Insertion, Implantable Loop Recorder;  Surgeon: Clifton Panda MD;  Location: [facility] CATH LAB;  Service: Cardiology;  Laterality: N/A;    LEFT HEART CATHETERIZATION Left 2019    Procedure: Left heart cath;  Surgeon: Cliftno Panda MD;  Location: [facility] CATH LAB;  Service: Cardiology;  Laterality: Left;    TONSILLECTOMY       Family History   Problem Relation Age of Onset    Stroke Mother     Cancer Mother         breast cancer    Cancer Father     Multiple sclerosis Daughter     Cancer Son         colon and liver     Social History     Tobacco Use    Smoking status: Former Smoker     Last attempt to quit: 1971     Years since quittin.0    Smokeless tobacco: Never Used   Substance Use Topics    Alcohol use: Yes     Comment: 3-4 cans of beer daily    Drug use: No          OBJECTIVE:     Vital Signs (Most Recent)  Temp: 97.9 °F (36.6 °C) (19 07)  Pulse: 87 (19 07)  Resp: 18 (19 07)  BP: (!) 207/89 (19 07)  SpO2: 100 % (19)         ASSESSMENT/PLAN:     Patient is a 84 y.o. female with CBD stones coming for EUS and ERCP.     Plan: EUS and ERCP    Anesthesia Plan: Moderate Sedation    ASA Grade: ASA 2 - Patient with mild systemic disease with no functional limitations

## 2019-12-24 ENCOUNTER — HOSPITAL ENCOUNTER (INPATIENT)
Facility: HOSPITAL | Age: 84
LOS: 3 days | Discharge: HOME | DRG: 417 | End: 2019-12-27
Attending: HOSPITALIST | Admitting: HOSPITALIST
Payer: MEDICARE

## 2019-12-24 DIAGNOSIS — N39.0 URINARY TRACT INFECTION WITHOUT HEMATURIA, SITE UNSPECIFIED: ICD-10-CM

## 2019-12-24 DIAGNOSIS — K80.50 CHOLEDOCHOLITHIASIS: Primary | ICD-10-CM

## 2019-12-24 DIAGNOSIS — R07.9 CHEST PAIN: ICD-10-CM

## 2019-12-24 DIAGNOSIS — R50.9 FEVER, UNKNOWN ORIGIN: ICD-10-CM

## 2019-12-24 PROCEDURE — 11000001 HC ACUTE MED/SURG PRIVATE ROOM

## 2019-12-24 RX ORDER — ACETAMINOPHEN 325 MG/1
650 TABLET ORAL EVERY 4 HOURS PRN
Status: DISCONTINUED | OUTPATIENT
Start: 2019-12-24 | End: 2019-12-27 | Stop reason: HOSPADM

## 2019-12-24 RX ORDER — SODIUM CHLORIDE, SODIUM LACTATE, POTASSIUM CHLORIDE, CALCIUM CHLORIDE 600; 310; 30; 20 MG/100ML; MG/100ML; MG/100ML; MG/100ML
INJECTION, SOLUTION INTRAVENOUS CONTINUOUS
Status: DISCONTINUED | OUTPATIENT
Start: 2019-12-25 | End: 2019-12-27 | Stop reason: HOSPADM

## 2019-12-24 RX ORDER — SODIUM CHLORIDE 0.9 % (FLUSH) 0.9 %
10 SYRINGE (ML) INJECTION
Status: DISCONTINUED | OUTPATIENT
Start: 2019-12-24 | End: 2019-12-27 | Stop reason: HOSPADM

## 2019-12-24 RX ORDER — ONDANSETRON 2 MG/ML
4 INJECTION INTRAMUSCULAR; INTRAVENOUS EVERY 8 HOURS PRN
Status: DISCONTINUED | OUTPATIENT
Start: 2019-12-24 | End: 2019-12-27 | Stop reason: HOSPADM

## 2019-12-24 RX ADMIN — SODIUM CHLORIDE, SODIUM LACTATE, POTASSIUM CHLORIDE, AND CALCIUM CHLORIDE: .6; .31; .03; .02 INJECTION, SOLUTION INTRAVENOUS at 11:12

## 2019-12-24 RX ADMIN — CEFTRIAXONE 1 G: 1 INJECTION, SOLUTION INTRAVENOUS at 11:12

## 2019-12-25 ENCOUNTER — ANESTHESIA EVENT (OUTPATIENT)
Dept: SURGERY | Facility: HOSPITAL | Age: 84
DRG: 417 | End: 2019-12-25
Payer: MEDICARE

## 2019-12-25 PROBLEM — R50.9 FEVER, UNKNOWN ORIGIN: Status: ACTIVE | Noted: 2019-12-25

## 2019-12-25 PROBLEM — N39.0 UTI (URINARY TRACT INFECTION): Status: ACTIVE | Noted: 2019-12-25

## 2019-12-25 LAB
ALBUMIN SERPL BCP-MCNC: 2.7 G/DL (ref 3.5–5.2)
ALP SERPL-CCNC: 144 U/L (ref 55–135)
ALT SERPL W/O P-5'-P-CCNC: 22 U/L (ref 10–44)
ANION GAP SERPL CALC-SCNC: 14 MMOL/L (ref 8–16)
AST SERPL-CCNC: 14 U/L (ref 10–40)
BASOPHILS # BLD AUTO: 0 K/UL (ref 0–0.2)
BASOPHILS NFR BLD: 0 % (ref 0–1.9)
BILIRUB SERPL-MCNC: 0.4 MG/DL (ref 0.1–1)
BUN SERPL-MCNC: 10 MG/DL (ref 8–23)
CALCIUM SERPL-MCNC: 8.9 MG/DL (ref 8.7–10.5)
CHLORIDE SERPL-SCNC: 108 MMOL/L (ref 95–110)
CO2 SERPL-SCNC: 18 MMOL/L (ref 23–29)
CREAT SERPL-MCNC: 0.8 MG/DL (ref 0.5–1.4)
DIFFERENTIAL METHOD: ABNORMAL
EOSINOPHIL # BLD AUTO: 0 K/UL (ref 0–0.5)
EOSINOPHIL NFR BLD: 0 % (ref 0–8)
ERYTHROCYTE [DISTWIDTH] IN BLOOD BY AUTOMATED COUNT: 13.7 % (ref 11.5–14.5)
EST. GFR  (AFRICAN AMERICAN): >60 ML/MIN/1.73 M^2
EST. GFR  (NON AFRICAN AMERICAN): >60 ML/MIN/1.73 M^2
ESTIMATED AVG GLUCOSE: 143 MG/DL (ref 68–131)
GLUCOSE SERPL-MCNC: 226 MG/DL (ref 70–110)
HBA1C MFR BLD HPLC: 6.6 % (ref 4–5.6)
HCT VFR BLD AUTO: 35.6 % (ref 37–48.5)
HGB BLD-MCNC: 11.5 G/DL (ref 12–16)
LYMPHOCYTES # BLD AUTO: 0.6 K/UL (ref 1–4.8)
LYMPHOCYTES NFR BLD: 7 % (ref 18–48)
MCH RBC QN AUTO: 29.3 PG (ref 27–31)
MCHC RBC AUTO-ENTMCNC: 32.3 G/DL (ref 32–36)
MCV RBC AUTO: 91 FL (ref 82–98)
MONOCYTES # BLD AUTO: 0.1 K/UL (ref 0.3–1)
MONOCYTES NFR BLD: 1.3 % (ref 4–15)
NEUTROPHILS # BLD AUTO: 7.7 K/UL (ref 1.8–7.7)
NEUTROPHILS NFR BLD: 91.7 % (ref 38–73)
PLATELET # BLD AUTO: 261 K/UL (ref 150–350)
PMV BLD AUTO: 10 FL (ref 9.2–12.9)
POTASSIUM SERPL-SCNC: 3.6 MMOL/L (ref 3.5–5.1)
PROT SERPL-MCNC: 6.7 G/DL (ref 6–8.4)
RBC # BLD AUTO: 3.92 M/UL (ref 4–5.4)
SODIUM SERPL-SCNC: 140 MMOL/L (ref 136–145)
WBC # BLD AUTO: 8.38 K/UL (ref 3.9–12.7)

## 2019-12-25 PROCEDURE — 99223 PR INITIAL HOSPITAL CARE,LEVL III: ICD-10-PCS | Mod: ,,, | Performed by: SURGERY

## 2019-12-25 PROCEDURE — 99223 1ST HOSP IP/OBS HIGH 75: CPT | Mod: ,,, | Performed by: SURGERY

## 2019-12-25 PROCEDURE — 25000003 PHARM REV CODE 250: Performed by: NURSE PRACTITIONER

## 2019-12-25 PROCEDURE — 63600175 PHARM REV CODE 636 W HCPCS: Performed by: NURSE PRACTITIONER

## 2019-12-25 PROCEDURE — 80053 COMPREHEN METABOLIC PANEL: CPT

## 2019-12-25 PROCEDURE — 36415 COLL VENOUS BLD VENIPUNCTURE: CPT

## 2019-12-25 PROCEDURE — 85025 COMPLETE CBC W/AUTO DIFF WBC: CPT

## 2019-12-25 PROCEDURE — 83036 HEMOGLOBIN GLYCOSYLATED A1C: CPT

## 2019-12-25 PROCEDURE — 11000001 HC ACUTE MED/SURG PRIVATE ROOM

## 2019-12-25 RX ORDER — ROSUVASTATIN CALCIUM 10 MG/1
10 TABLET, COATED ORAL NIGHTLY
Status: DISCONTINUED | OUTPATIENT
Start: 2019-12-25 | End: 2019-12-27 | Stop reason: HOSPADM

## 2019-12-25 RX ORDER — TALC
6 POWDER (GRAM) TOPICAL NIGHTLY PRN
Status: DISCONTINUED | OUTPATIENT
Start: 2019-12-25 | End: 2019-12-27 | Stop reason: HOSPADM

## 2019-12-25 RX ORDER — AMLODIPINE BESYLATE 2.5 MG/1
2.5 TABLET ORAL DAILY
Status: DISCONTINUED | OUTPATIENT
Start: 2019-12-25 | End: 2019-12-27 | Stop reason: HOSPADM

## 2019-12-25 RX ORDER — FOLIC ACID 1 MG/1
1000 TABLET ORAL DAILY
Status: DISCONTINUED | OUTPATIENT
Start: 2019-12-25 | End: 2019-12-27 | Stop reason: HOSPADM

## 2019-12-25 RX ORDER — CEFOXITIN SODIUM 2 G/50ML
2 INJECTION, SOLUTION INTRAVENOUS
Status: DISCONTINUED | OUTPATIENT
Start: 2019-12-26 | End: 2019-12-25

## 2019-12-25 RX ORDER — LEVOTHYROXINE SODIUM 75 UG/1
75 TABLET ORAL
Status: DISCONTINUED | OUTPATIENT
Start: 2019-12-25 | End: 2019-12-27 | Stop reason: HOSPADM

## 2019-12-25 RX ORDER — LOSARTAN POTASSIUM 50 MG/1
50 TABLET ORAL NIGHTLY
Status: DISCONTINUED | OUTPATIENT
Start: 2019-12-25 | End: 2019-12-27 | Stop reason: HOSPADM

## 2019-12-25 RX ORDER — HYDRALAZINE HYDROCHLORIDE 20 MG/ML
10 INJECTION INTRAMUSCULAR; INTRAVENOUS EVERY 8 HOURS PRN
Status: DISCONTINUED | OUTPATIENT
Start: 2019-12-25 | End: 2019-12-27 | Stop reason: HOSPADM

## 2019-12-25 RX ORDER — SPIRONOLACTONE 25 MG/1
25 TABLET ORAL DAILY
Status: DISCONTINUED | OUTPATIENT
Start: 2019-12-25 | End: 2019-12-27 | Stop reason: HOSPADM

## 2019-12-25 RX ORDER — HYDRALAZINE HYDROCHLORIDE 20 MG/ML
10 INJECTION INTRAMUSCULAR; INTRAVENOUS EVERY 8 HOURS PRN
Status: DISCONTINUED | OUTPATIENT
Start: 2019-12-25 | End: 2019-12-25

## 2019-12-25 RX ADMIN — AMLODIPINE BESYLATE 2.5 MG: 2.5 TABLET ORAL at 08:12

## 2019-12-25 RX ADMIN — ROSUVASTATIN CALCIUM 10 MG: 10 TABLET, COATED ORAL at 08:12

## 2019-12-25 RX ADMIN — SPIRONOLACTONE 25 MG: 25 TABLET ORAL at 08:12

## 2019-12-25 RX ADMIN — FOLIC ACID 1000 MCG: 1 TABLET ORAL at 08:12

## 2019-12-25 RX ADMIN — LEVOTHYROXINE SODIUM 75 MCG: 75 TABLET ORAL at 08:12

## 2019-12-25 RX ADMIN — SODIUM CHLORIDE, SODIUM LACTATE, POTASSIUM CHLORIDE, AND CALCIUM CHLORIDE: .6; .31; .03; .02 INJECTION, SOLUTION INTRAVENOUS at 01:12

## 2019-12-25 RX ADMIN — LOSARTAN POTASSIUM 50 MG: 50 TABLET ORAL at 08:12

## 2019-12-25 RX ADMIN — HYDRALAZINE HYDROCHLORIDE 10 MG: 20 INJECTION INTRAMUSCULAR; INTRAVENOUS at 08:12

## 2019-12-25 RX ADMIN — Medication 6 MG: at 09:12

## 2019-12-25 NOTE — PROGRESS NOTES
[facility] Medicine  Progress Note    Patient Name: Treasure Rueda  MRN: [id number]  Patient Class: IP- Inpatient   Admission Date: 12/24/2019  Length of Stay: 1 days  Attending Physician: Gopal Horn DO  Primary Care Provider: Primary Doctor Eduarda        Subjective:     Principal Problem:Fever, unknown origin        HPI:  83 y/o female with PMH of hypertension, hyperlipidemia, CAD, paroxysmal atrial fibrillation/aflutter, hx of SVT, chronic diastolic dysfunction, carotid artery stenosis, prediabetes and recent recent choledocholithiasis presented with fever to 101.2 and abdominal pain. Pt recently underwent EUS and ERCP at [place] on 12/20.  CT abd/pelvis done showed gallstones but no cholecystitis but stranding around the entire pancreas concerning for acute pancreatitis. Pt afebrile and hemodynamically stable. No leukocytosis and normal bilirubin. Given zosyn and cipro in ED. Case was discussed with GI. Transfer requested for GI service.     Overview/Hospital Course:  12/25 pt seen, doing ok, still some abd pain but asking for some food, case disussed with gen surg, will likely have OR for lap brigido in am tomorrow    Past Medical History:   Diagnosis Date    Anticoagulant long-term use     Arthritis     Atrial flutter     Stent place in December 2017    CAD (coronary artery disease)     with stent placement    CHF (congestive heart failure)     Encounter for blood transfusion     Gall stones     Hearing loss of both ears     Hyperlipidemia     Hypertension     Hypothyroidism     Osteoporosis, post-menopausal        Past Surgical History:   Procedure Laterality Date    ABLATION N/A 9/5/2018    Procedure: ABLATION;  Surgeon: Damir David MD;  Location: [facility] CATH LAB;  Service: Cardiology;  Laterality: N/A;  SVT, SSS, RFA, OSCAR, +/- Dual PPM, ______, Choice, MB, 3 Prep *Contrast Prep*    APPENDECTOMY      BLADDER SUSPENSION      CORONARY ANGIOPLASTY WITH STENT  PLACEMENT      ENDOSCOPIC ULTRASOUND OF UPPER GASTROINTESTINAL TRACT N/A 12/20/2019    Procedure: ULTRASOUND, UPPER GI TRACT, ENDOSCOPIC;  Surgeon: Meredith Ford MD;  Location: [facility] ENDO;  Service: Endoscopy;  Laterality: N/A;    ERCP  12/20/2019    ERCP N/A 12/20/2019    Procedure: ERCP (ENDOSCOPIC RETROGRADE CHOLANGIOPANCREATOGRAPHY);  Surgeon: Meredith Ford MD;  Location: [facility] ENDO;  Service: Endoscopy;  Laterality: N/A;    HYSTERECTOMY      INSERTION OF IMPLANTABLE LOOP RECORDER N/A 8/26/2019    Procedure: Insertion, Implantable Loop Recorder;  Surgeon: Clifton Panda MD;  Location: [facility] CATH LAB;  Service: Cardiology;  Laterality: N/A;    LEFT HEART CATHETERIZATION Left 6/20/2019    Procedure: Left heart cath;  Surgeon: Clifton Panda MD;  Location: [facility] CATH LAB;  Service: Cardiology;  Laterality: Left;    TONSILLECTOMY         Review of patient's allergies indicates:   Allergen Reactions    Iodine and iodide containing products Nausea And Vomiting, Swelling and Rash    Shellfish containing products Diarrhea    Anectine [succinylcholine chloride]      Family history of issues (multiple family members)    Fish containing products     Pork/porcine containing products     Squash        Current Facility-Administered Medications on File Prior to Encounter   Medication    [COMPLETED] ciprofloxacin (CIPRO)400mg/200ml D5W IVPB 400 mg    [COMPLETED] dexamethasone injection 8 mg    [COMPLETED] diphenhydrAMINE injection 25 mg    [COMPLETED] iohexol (OMNIPAQUE 350) injection 75 mL    [COMPLETED] piperacillin-tazobactam 4.5 g in dextrose 5 % 100 mL IVPB (ready to mix system)    [COMPLETED] sodium chloride 0.9% bolus 1,797 mL    [DISCONTINUED] piperacillin-tazobactam 4.5 g in dextrose 5 % 100 mL IVPB (ready to mix system)     Current Outpatient Medications on File Prior to Encounter   Medication Sig    acetaminophen (TYLENOL) 325 MG tablet Take 325 mg by mouth daily as needed  for Pain.    amLODIPine (NORVASC) 2.5 MG tablet Take 2.5 mg by mouth once daily.    apixaban (ELIQUIS) 5 mg Tab Take 5 mg by mouth 2 (two) times daily.    COD LIVER OIL ORAL Take 1 capsule by mouth once daily.     folic acid (FOLVITE) 400 MCG tablet Take 400 mcg by mouth once daily.    irbesartan (AVAPRO) 150 MG tablet Take 1 tablet (150 mg total) by mouth once daily. (Patient taking differently: Take 150 mg by mouth every evening. )    levothyroxine (SYNTHROID) 75 MCG tablet Take 1 tablet (75 mcg total) by mouth once daily.    rosuvastatin (CRESTOR) 40 MG Tab Take 10 mg by mouth every evening.    spironolactone (ALDACTONE) 25 MG tablet Take 25 mg by mouth once daily.    ticagrelor (BRILINTA) 90 mg tablet Take 1 tablet (90 mg total) by mouth 2 (two) times daily.    vitamin D 185 MG Tab Take 185 mg by mouth once daily.    alendronate (FOSAMAX) 70 MG tablet Take 1 tablet (70 mg total) by mouth every 7 days.     Family History     Problem Relation (Age of Onset)    Cancer Mother, Father, Son    Multiple sclerosis Daughter    Stroke Mother        Tobacco Use    Smoking status: Former Smoker     Last attempt to quit: 1971     Years since quittin.0    Smokeless tobacco: Never Used   Substance and Sexual Activity    Alcohol use: Yes     Comment: 3-4 cans of beer daily    Drug use: No    Sexual activity: Not Currently     Review of Systems   Constitutional: Negative for chills, diaphoresis and fever.   Eyes: Negative for photophobia.   Respiratory: Negative for cough, chest tightness, shortness of breath and wheezing.    Cardiovascular: Negative for chest pain, palpitations and leg swelling.   Gastrointestinal: Positive for abdominal pain. Negative for diarrhea, nausea and vomiting.   Genitourinary: Negative for dysuria, flank pain, frequency and hematuria.   Musculoskeletal: Negative for back pain and myalgias.   Neurological: Negative for dizziness, syncope, light-headedness and headaches.    Psychiatric/Behavioral: Negative for agitation and confusion. The patient is not nervous/anxious.      Objective:     Vital Signs (Most Recent):  Temp: 97.1 °F (36.2 °C) (12/25/19 1214)  Pulse: 72 (12/25/19 1214)  Resp: 18 (12/25/19 1214)  BP: (!) 175/77 (12/25/19 1214)  SpO2: 96 % (12/24/19 2239) Vital Signs (24h Range):  Temp:  [97.1 °F (36.2 °C)-100.6 °F (38.1 °C)] 97.1 °F (36.2 °C)  Pulse:  [] 72  Resp:  [16-20] 18  SpO2:  [96 %-99 %] 96 %  BP: (138-188)/(65-97) 175/77     Weight: 62 kg (136 lb 11 oz)  Body mass index is 24.21 kg/m².    Physical Exam   Constitutional: She is oriented to person, place, and time. She appears well-developed and well-nourished.   HENT:   Head: Normocephalic and atraumatic.   Eyes: Conjunctivae and EOM are normal.   Neck: Normal range of motion. No JVD present.   Cardiovascular: Normal rate and regular rhythm.   Pulmonary/Chest: Effort normal. No respiratory distress.   Abdominal: Soft. Bowel sounds are normal. She exhibits no distension. There is tenderness (epigastric). There is no guarding.   Musculoskeletal: Normal range of motion. She exhibits no edema or tenderness.   Neurological: She is alert and oriented to person, place, and time.   Skin: Skin is warm and dry.   Psychiatric: She has a normal mood and affect. Her behavior is normal. Judgment and thought content normal.         CRANIAL NERVES     CN III, IV, VI   Extraocular motions are normal.        Significant Labs:   Recent Labs   Lab 12/24/19  1731 12/25/19  0411   WBC 12.57 8.38   HGB 12.6 11.5*   HCT 38.7 35.6*    261     Recent Labs   Lab 12/24/19  1731 12/25/19  0411    140   K 3.8 3.6    108   CO2 25 18*   BUN 12 10   CREATININE 0.76 0.8   * 226*   CALCIUM 9.3 8.9     Recent Labs   Lab 12/24/19  1731 12/25/19  0411   ALKPHOS 186* 144*   ALT 32 22   AST 25 14   ALBUMIN 4.0 2.7*   PROT 7.8 6.7   BILITOT 0.6 0.4      No results for input(s): CPK, CPKMB, MB, TROPONINI in the last 72  hours.  No results for input(s): POCTGLUCOSE in the last 168 hours.  Hemoglobin A1C   Date Value Ref Range Status   12/25/2019 6.6 (H) 4.0 - 5.6 % Final     Comment:     ADA Screening Guidelines:  5.7-6.4%  Consistent with prediabetes  >or=6.5%  Consistent with diabetes  High levels of fetal hemoglobin interfere with the HbA1C  assay. Heterozygous hemoglobin variants (HbS, HgC, etc)do  not significantly interfere with this assay.   However, presence of multiple variants may affect accuracy.     05/11/2017 6.2 4.5 - 6.2 % Final     Comment:     According to ADA guidelines, hemoglobin A1C <7.0% represents  optimal control in non-pregnant diabetic patients.  Different  metrics may apply to specific populations.   Standards of Medical Care in Diabetes - 2016.  For the purpose of screening for the presence of diabetes:  <5.7%     Consistent with the absence of diabetes  5.7-6.4%  Consistent with increasing risk for diabetes   (prediabetes)  >or=6.5%  Consistent with diabetes  Currently no consensus exists for use of hemoglobin A1C  for diagnosis of diabetes for children.     03/28/2016 6.1 (H) <5.7 Final     Comment:     Units:  % of total Hgb  According to ADA guidelines, hemoglobin A1c <7.0%  represents optimal control in non-pregnant diabetic  patients. Different metrics may apply to specific  patient populations. Standards of Medical Care in  Diabetes-2013. Diabetes Care. 2013;36:s11-s66  For the purpose of screening for the presence of  diabetes  <5.7%       Consistent with the absence of diabetes  5.7-6.4%    Consistent with increased risk for diabetes  (prediabetes)  >or=6.5%    Consistent with diabetes  This assay result is consistent with a higher risk  of diabetes.  Currently, no consensus exists for use of hemoglobin  A1c for diagnosis of diabetes for children.  Test Performed at:  [facility]  [address] [place]  [address]     CATRACHO CHAWLA MD       Scheduled Meds:   amLODIPine   2.5 mg Oral Daily    [START ON 12/27/2019] apixaban  5 mg Oral BID    cefTRIAXone (ROCEPHIN) IVPB  1 g Intravenous Q24H    folic acid  1,000 mcg Oral Daily    levothyroxine  75 mcg Oral Before breakfast    rosuvastatin  10 mg Oral QHS    spironolactone  25 mg Oral Daily    [START ON 12/27/2019] ticagrelor  90 mg Oral BID     Continuous Infusions:   lactated ringers 75 mL/hr at 12/24/19 2324     As Needed: acetaminophen, ondansetron, sodium chloride 0.9%    Significant Imaging: I have reviewed all pertinent imaging results/findings within the past 24 hours.      Assessment/Plan:      * Fever, unknown origin  lilely from choletiasis.  Ceftriaxone, f/u with gen surg        UTI (urinary tract infection)  Continue ceftriaxone  , follow urine culture       Atrial flutter with rapid ventricular response    No acute events      Choledocholithiasis        Coronary artery disease involving native coronary artery  Hold apixaban for sx tomorrow      Prediabetes  Blood glucose > 200, check A1C       Essential hypertension  Hyperlipidemia   Coronary artery disease involving native coronary artery s/p PCI   Atrial flutter with rapid ventricular response  Tachy-jonh syndrome  S/p loop recorder placement   Chronic diastolic dysfunction     Taking Apixaban and Brilinta- continue   Continue amlodipine, spironolactone and statin   Irbesartan not on hospital formulary   Monitor tele       VTE Risk Mitigation (From admission, onward)         Ordered     apixaban tablet 5 mg  2 times daily      12/25/19 1236     IP VTE HIGH RISK PATIENT  Once      12/24/19 2256     Place sequential compression device  Until discontinued      12/24/19 2256                      Gopal Horn DO  Department of Hospital Medicine   [facility]

## 2019-12-25 NOTE — HPI
83 y/o female with PMH of hypertension, hyperlipidemia, CAD, paroxysmal atrial fibrillation/aflutter, hx of SVT, chronic diastolic dysfunction, carotid artery stenosis, prediabetes and recent recent choledocholithiasis presented with fever to 101.2 and abdominal pain. Pt recently underwent EUS and ERCP at [place] on 12/20.  CT abd/pelvis done showed gallstones but no cholecystitis but stranding around the entire pancreas concerning for acute pancreatitis. Pt afebrile and hemodynamically stable. No leukocytosis and normal bilirubin. Given zosyn and cipro in ED. Case was discussed with GI. Transfer requested for GI service.

## 2019-12-25 NOTE — ANESTHESIA PREPROCEDURE EVALUATION
12/25/2019  Treasure Rueda is a 84 y.o., female for Lap brigido under GETA with history of  hypertension, hyperlipidemia, CAD status post stent on anti-platelet therapy, proximal AFib, chronic diastolic dysfunction. Cardiac stent placed 6/20/19, previously on eliquis/brilenta.    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     Past Medical History:   Diagnosis Date    Anticoagulant long-term use     Arthritis     Atrial flutter     Stent place in December 2017    CAD (coronary artery disease)     with stent placement    CHF (congestive heart failure)     Encounter for blood transfusion     Gall stones     Hearing loss of both ears     Hyperlipidemia     Hypertension     Hypothyroidism     Osteoporosis, post-menopausal      Past Surgical History:   Procedure Laterality Date    ABLATION N/A 9/5/2018    Procedure: ABLATION;  Surgeon: Damir David MD;  Location: [facility] CATH LAB;  Service: Cardiology;  Laterality: N/A;  SVT, SSS, RFA, OSCAR, +/- Dual PPM, ______, Choice, MB, 3 Prep *Contrast Prep*    APPENDECTOMY      BLADDER SUSPENSION      CORONARY ANGIOPLASTY WITH STENT PLACEMENT      ENDOSCOPIC ULTRASOUND OF UPPER GASTROINTESTINAL TRACT N/A 12/20/2019    Procedure: ULTRASOUND, UPPER GI TRACT, ENDOSCOPIC;  Surgeon: Meredith Ford MD;  Location: [facility] ENDO;  Service: Endoscopy;  Laterality: N/A;    ERCP  12/20/2019    ERCP N/A 12/20/2019    Procedure: ERCP (ENDOSCOPIC RETROGRADE CHOLANGIOPANCREATOGRAPHY);  Surgeon: Meredith Ford MD;  Location: [facility] ENDO;  Service: Endoscopy;  Laterality: N/A;    HYSTERECTOMY      INSERTION OF IMPLANTABLE LOOP RECORDER N/A 8/26/2019    Procedure: Insertion, Implantable Loop Recorder;  Surgeon: Clifton Panda MD;  Location: [facility] CATH LAB;  Service: Cardiology;  Laterality: N/A;    LEFT HEART  CATHETERIZATION Left 6/20/2019    Procedure: Left heart cath;  Surgeon: Clifton Panda MD;  [facility];  Service: Cardiology;  Laterality: Left;    TONSILLECTOMY           Pre-op Assessment    I have reviewed the Patient Summary Reports.     I have reviewed the Nursing Notes.   I have reviewed the Medications.     Review of Systems  Anesthesia Hx:  No problems with previous Anesthesia Hx of Anesthetic complications Psuedocholineesterase deficiency in the family (her mother).    Social:  Former Smoker, No Alcohol Use    Cardiovascular:   Hypertension, well controlled CAD  CABG/stent (stent 12/2017)  CHF hyperlipidemia ECG has been reviewed. SVT hx   Renal/:  Renal/ Normal     Hepatic/GI:   CBD stone   Neurological:  Neurology Normal    Endocrine:   Hypothyroidism      ECHo:    CONCLUSIONS     1 - Normal left ventricular systolic function (EF 55-60%).     2 - Indeterminate LV diastolic function.     3 - Normal right ventricular systolic function .     Physical Exam  General:  Well nourished    Airway/Jaw/Neck:  Airway Findings: Mouth Opening: Normal Tongue: Normal  General Airway Assessment: Adult  Mallampati: I  Improves to I with phonation.  TM Distance: Normal, at least 6 cm      Dental:  Dental Findings: Periodontal disease, Severe   Chest/Lungs:  Chest/Lungs Findings: Clear to auscultation, Normal Respiratory Rate     Heart/Vascular:  Heart Findings: Rate: Normal  Rhythm: Regular Rhythm  Sounds: Normal        Mental Status:  Mental Status Findings:  Cooperative, Alert and Oriented       Lab Results   Component Value Date    WBC 8.38 12/25/2019    HGB 11.5 (L) 12/25/2019    HCT 35.6 (L) 12/25/2019     12/25/2019    CHOL 209 (H) 05/11/2017    TRIG 190 (H) 05/11/2017    HDL 44 05/11/2017    ALT 22 12/25/2019    AST 14 12/25/2019     12/25/2019    K 3.6 12/25/2019     12/25/2019    CREATININE 0.8 12/25/2019    BUN 10 12/25/2019    CO2 18 (L) 12/25/2019    TSH 2.680  01/17/2018    INR 0.9 09/06/2018    HGBA1C 6.6 (H) 12/25/2019       CONCLUSIONS     1 - Normal left ventricular systolic function (EF 55-60%).     2 - Indeterminate LV diastolic function.     3 - Normal right ventricular systolic function .   his document has been electronically    SIGNED BY: Clifton Panda MD On: 01/08/2018 09:05      Normal sinus rhythm  Nonspecific T wave abnormality  Abnormal ECG  When compared with ECG of 20-JUN-2019 14:04,  Premature atrial complexes are no longer Present  Confirmed by Rodo Frias III, MD ([id number]) on 12/11/2019 6:00:02 PM    Anesthesia Plan  Type of Anesthesia, risks & benefits discussed:  Anesthesia Type:  general, MAC  Patient's Preference:   Intra-op Monitoring Plan: standard ASA monitors  Intra-op Monitoring Plan Comments:   Post Op Pain Control Plan: per primary service following discharge from PACU  Post Op Pain Control Plan Comments:   Induction:   IV  Beta Blocker:  Patient is not currently on a Beta-Blocker (No further documentation required).       Informed Consent: Patient understands risks and agrees with Anesthesia plan.  Questions answered. Anesthesia consent signed with patient.  ASA Score: 3     Day of Surgery Review of History & Physical:        Anesthesia Plan Notes: Patient is hard of hearing. She tells me that she is comfortable signing consent. She reads lips well.  Will avoid succinylcholine.         Ready For Surgery From Anesthesia Perspective.

## 2019-12-25 NOTE — NURSING
[facility] arrived to [facility] 10:15[facility]  from [facility]. Daughter present. Introduced myself as her bedside nurse and informed about the VN and her role in patient care. Vital signs taken and are within normal limits.Call bell explained and discussed the importance of prevention of fall during hospitalization. NP just ordered IV fluids and to keep her NPO tonight. Plan of care reviewed with patient and her daughter. Verbalized understanding. Will continue to monitor patient.

## 2019-12-25 NOTE — ASSESSMENT & PLAN NOTE
Choledocholithiasis S/p EUS and ERCP 12/20   LFTs and bilirubin with normal limits   Received empiric zosyn and cipro  GI consulted   Keep NPO for now

## 2019-12-25 NOTE — SUBJECTIVE & OBJECTIVE
Past Medical History:   Diagnosis Date    Anticoagulant long-term use     Arthritis     Atrial flutter     Stent place in December 2017    CAD (coronary artery disease)     with stent placement    CHF (congestive heart failure)     Encounter for blood transfusion     Gall stones     Hearing loss of both ears     Hyperlipidemia     Hypertension     Hypothyroidism     Osteoporosis, post-menopausal        Past Surgical History:   Procedure Laterality Date    ABLATION N/A 9/5/2018    Procedure: ABLATION;  Surgeon: Damir David MD;  Location: [facility] CATH LAB;  Service: Cardiology;  Laterality: N/A;  SVT, SSS, RFA, OSCAR, +/- Dual PPM, ______, Choice, MB, 3 Prep *Contrast Prep*    APPENDECTOMY      BLADDER SUSPENSION      CORONARY ANGIOPLASTY WITH STENT PLACEMENT      ENDOSCOPIC ULTRASOUND OF UPPER GASTROINTESTINAL TRACT N/A 12/20/2019    Procedure: ULTRASOUND, UPPER GI TRACT, ENDOSCOPIC;  Surgeon: Meredith Ford MD;  Location: [facility];  Service: Endoscopy;  Laterality: N/A;    ERCP  12/20/2019    ERCP N/A 12/20/2019    Procedure: ERCP (ENDOSCOPIC RETROGRADE CHOLANGIOPANCREATOGRAPHY);  Surgeon: Meredith Ford MD;  Location: [facility];  Service: Endoscopy;  Laterality: N/A;    HYSTERECTOMY      INSERTION OF IMPLANTABLE LOOP RECORDER N/A 8/26/2019    Procedure: Insertion, Implantable Loop Recorder;  Surgeon: Clifton Panda MD;  Location: [facility] CATH LAB;  Service: Cardiology;  Laterality: N/A;    LEFT HEART CATHETERIZATION Left 6/20/2019    Procedure: Left heart cath;  Surgeon: Clifton Panda MD;  Location: [facility] CATH LAB;  Service: Cardiology;  Laterality: Left;    TONSILLECTOMY         Review of patient's allergies indicates:   Allergen Reactions    Iodine and iodide containing products Nausea And Vomiting, Swelling and Rash    Shellfish containing products Diarrhea    Anectine [succinylcholine chloride]      Family history of issues (multiple family members)    Fish  containing products     Pork/porcine containing products     Squash        Current Facility-Administered Medications on File Prior to Encounter   Medication    [COMPLETED] ciprofloxacin (CIPRO)400mg/200ml D5W IVPB 400 mg    [COMPLETED] dexamethasone injection 8 mg    [COMPLETED] diphenhydrAMINE injection 25 mg    [COMPLETED] iohexol (OMNIPAQUE 350) injection 75 mL    [COMPLETED] piperacillin-tazobactam 4.5 g in dextrose 5 % 100 mL IVPB (ready to mix system)    [COMPLETED] sodium chloride 0.9% bolus 1,797 mL    [DISCONTINUED] piperacillin-tazobactam 4.5 g in dextrose 5 % 100 mL IVPB (ready to mix system)     Current Outpatient Medications on File Prior to Encounter   Medication Sig    acetaminophen (TYLENOL) 325 MG tablet Take 325 mg by mouth daily as needed for Pain.    amLODIPine (NORVASC) 2.5 MG tablet Take 2.5 mg by mouth once daily.    apixaban (ELIQUIS) 5 mg Tab Take 5 mg by mouth 2 (two) times daily.    COD LIVER OIL ORAL Take 1 capsule by mouth once daily.     folic acid (FOLVITE) 400 MCG tablet Take 400 mcg by mouth once daily.    irbesartan (AVAPRO) 150 MG tablet Take 1 tablet (150 mg total) by mouth once daily. (Patient taking differently: Take 150 mg by mouth every evening. )    levothyroxine (SYNTHROID) 75 MCG tablet Take 1 tablet (75 mcg total) by mouth once daily.    rosuvastatin (CRESTOR) 40 MG Tab Take 10 mg by mouth every evening.    spironolactone (ALDACTONE) 25 MG tablet Take 25 mg by mouth once daily.    ticagrelor (BRILINTA) 90 mg tablet Take 1 tablet (90 mg total) by mouth 2 (two) times daily.    vitamin D 185 MG Tab Take 185 mg by mouth once daily.    alendronate (FOSAMAX) 70 MG tablet Take 1 tablet (70 mg total) by mouth every 7 days.     Family History     Problem Relation (Age of Onset)    Cancer Mother, Father, Son    Multiple sclerosis Daughter    Stroke Mother        Tobacco Use    Smoking status: Former Smoker     Last attempt to quit: 1971     Years since  quittin.0    Smokeless tobacco: Never Used   Substance and Sexual Activity    Alcohol use: Yes     Comment: 3-4 cans of beer daily    Drug use: No    Sexual activity: Not Currently     Review of Systems   Constitutional: Positive for chills and fever. Negative for diaphoresis.   Eyes: Negative for photophobia.   Respiratory: Negative for cough, chest tightness, shortness of breath and wheezing.    Cardiovascular: Negative for chest pain, palpitations and leg swelling.   Gastrointestinal: Negative for abdominal pain, diarrhea, nausea and vomiting.   Genitourinary: Negative for dysuria, flank pain, frequency and hematuria.   Musculoskeletal: Negative for back pain and myalgias.   Neurological: Negative for dizziness, syncope, light-headedness and headaches.   Psychiatric/Behavioral: Negative for confusion.     Objective:     Vital Signs (Most Recent):  Temp: 97.4 °F (36.3 °C) (19)  Pulse: 62 (19)  Resp: 17 (19)  BP: (!) 141/65 (19)  SpO2: 96 % (199) Vital Signs (24h Range):  Temp:  [97.4 °F (36.3 °C)-100.6 °F (38.1 °C)] 97.4 °F (36.3 °C)  Pulse:  [] 62  Resp:  [16-20] 17  SpO2:  [96 %-99 %] 96 %  BP: (138-188)/(65-97) 141/65     Weight: 62 kg (136 lb 11 oz)  Body mass index is 24.21 kg/m².    Physical Exam   Constitutional: She is oriented to person, place, and time. She appears well-developed and well-nourished.   HENT:   Head: Normocephalic and atraumatic.   Eyes: Pupils are equal, round, and reactive to light. Conjunctivae are normal.   Neck: Normal range of motion. No JVD present.   Cardiovascular: Normal rate, regular rhythm, normal heart sounds and intact distal pulses.   Pulmonary/Chest: Effort normal. No respiratory distress. She has no wheezes.   Abdominal: Soft. Bowel sounds are normal. She exhibits no distension. There is no tenderness. There is no guarding.   Musculoskeletal: Normal range of motion. She exhibits no edema or tenderness.    Neurological: She is alert and oriented to person, place, and time.   Skin: Skin is warm and dry. Capillary refill takes less than 2 seconds.   Psychiatric: She has a normal mood and affect. Her behavior is normal.         CRANIAL NERVES     CN III, IV, VI   Pupils are equal, round, and reactive to light.       Significant Labs:   BMP:   Recent Labs   Lab 12/25/19  0411   *      K 3.6      CO2 18*   BUN 10   CREATININE 0.8   CALCIUM 8.9     CBC:   Recent Labs   Lab 12/24/19  1731 12/25/19  0411   WBC 12.57 8.38   HGB 12.6 11.5*   HCT 38.7 35.6*    261     Urine Studies:   Recent Labs   Lab 12/24/19  1853   COLORU Yellow   APPEARANCEUA Cloudy*   PHUR 7.0   SPECGRAV 1.010   PROTEINUA 2+*   GLUCUA Negative   KETONESU Negative   BILIRUBINUA Negative   OCCULTUA 1+*   NITRITE Negative   UROBILINOGEN Negative   LEUKOCYTESUR 2+*   RBCUA 2   WBCUA >100*   BACTERIA Many*   HYALINECASTS 0       Significant Imaging: I have reviewed all pertinent imaging results/findings within the past 24 hours.

## 2019-12-25 NOTE — ASSESSMENT & PLAN NOTE
Hyperlipidemia   Coronary artery disease involving native coronary artery s/p PCI   Atrial flutter with rapid ventricular response  Tachy-jonh syndrome  S/p loop recorder placement   Chronic diastolic dysfunction     Taking Apixaban and Brilinta- continue   Continue amlodipine, spironolactone and statin   Irbesartan not on hospital formulary   Monitor tele

## 2019-12-25 NOTE — H&P
[facility] Hospital Medicine  History & Physical    Patient Name: Treasure Rueda  MRN: [id number]  Admission Date: 12/24/2019  Attending Physician: Gopal Horn DO   Primary Care Provider: Primary Doctor No         Patient information was obtained from patient, past medical records and ER records.     Subjective:     Principal Problem:Fever, unknown origin    Chief Complaint: No chief complaint on file.       HPI: 83 y/o female with PMH of hypertension, hyperlipidemia, CAD, paroxysmal atrial fibrillation/aflutter, hx of SVT, chronic diastolic dysfunction, carotid artery stenosis, prediabetes and recent recent choledocholithiasis presented with fever to 101.2 and abdominal pain. Pt recently underwent EUS and ERCP at [place] on 12/20.  CT abd/pelvis done showed gallstones but no cholecystitis but stranding around the entire pancreas concerning for acute pancreatitis. Pt afebrile and hemodynamically stable. No leukocytosis and normal bilirubin. Given zosyn and cipro in ED. Case was discussed with GI. Transfer requested for GI service.     Past Medical History:   Diagnosis Date    Anticoagulant long-term use     Arthritis     Atrial flutter     Stent place in December 2017    CAD (coronary artery disease)     with stent placement    CHF (congestive heart failure)     Encounter for blood transfusion     Gall stones     Hearing loss of both ears     Hyperlipidemia     Hypertension     Hypothyroidism     Osteoporosis, post-menopausal        Past Surgical History:   Procedure Laterality Date    ABLATION N/A 9/5/2018    Procedure: ABLATION;  Surgeon: Damir David MD;  Location: [facility] CATH LAB;  Service: Cardiology;  Laterality: N/A;  SVT, SSS, RFA, OSCAR, +/- Dual PPM, ______, Choice, MB, 3 Prep *Contrast Prep*    APPENDECTOMY      BLADDER SUSPENSION      CORONARY ANGIOPLASTY WITH STENT PLACEMENT      ENDOSCOPIC ULTRASOUND OF UPPER GASTROINTESTINAL TRACT N/A 12/20/2019     Procedure: ULTRASOUND, UPPER GI TRACT, ENDOSCOPIC;  Surgeon: Meredith Ford MD;  Location: [facility] ENDO;  Service: Endoscopy;  Laterality: N/A;    ERCP  12/20/2019    ERCP N/A 12/20/2019    Procedure: ERCP (ENDOSCOPIC RETROGRADE CHOLANGIOPANCREATOGRAPHY);  Surgeon: Meredith Ford MD;  Location: [facility] ENDO;  Service: Endoscopy;  Laterality: N/A;    HYSTERECTOMY      INSERTION OF IMPLANTABLE LOOP RECORDER N/A 8/26/2019    Procedure: Insertion, Implantable Loop Recorder;  Surgeon: Clifton Panda MD;  Location: [facility] CATH LAB;  Service: Cardiology;  Laterality: N/A;    LEFT HEART CATHETERIZATION Left 6/20/2019    Procedure: Left heart cath;  Surgeon: Clifton Panda MD;  Location: [facility] CATH LAB;  Service: Cardiology;  Laterality: Left;    TONSILLECTOMY         Review of patient's allergies indicates:   Allergen Reactions    Iodine and iodide containing products Nausea And Vomiting, Swelling and Rash    Shellfish containing products Diarrhea    Anectine [succinylcholine chloride]      Family history of issues (multiple family members)    Fish containing products     Pork/porcine containing products     Squash        Current Facility-Administered Medications on File Prior to Encounter   Medication    [COMPLETED] ciprofloxacin (CIPRO)400mg/200ml D5W IVPB 400 mg    [COMPLETED] dexamethasone injection 8 mg    [COMPLETED] diphenhydrAMINE injection 25 mg    [COMPLETED] iohexol (OMNIPAQUE 350) injection 75 mL    [COMPLETED] piperacillin-tazobactam 4.5 g in dextrose 5 % 100 mL IVPB (ready to mix system)    [COMPLETED] sodium chloride 0.9% bolus 1,797 mL    [DISCONTINUED] piperacillin-tazobactam 4.5 g in dextrose 5 % 100 mL IVPB (ready to mix system)     Current Outpatient Medications on File Prior to Encounter   Medication Sig    acetaminophen (TYLENOL) 325 MG tablet Take 325 mg by mouth daily as needed for Pain.    amLODIPine (NORVASC) 2.5 MG tablet Take 2.5 mg by mouth once daily.     apixaban (ELIQUIS) 5 mg Tab Take 5 mg by mouth 2 (two) times daily.    COD LIVER OIL ORAL Take 1 capsule by mouth once daily.     folic acid (FOLVITE) 400 MCG tablet Take 400 mcg by mouth once daily.    irbesartan (AVAPRO) 150 MG tablet Take 1 tablet (150 mg total) by mouth once daily. (Patient taking differently: Take 150 mg by mouth every evening. )    levothyroxine (SYNTHROID) 75 MCG tablet Take 1 tablet (75 mcg total) by mouth once daily.    rosuvastatin (CRESTOR) 40 MG Tab Take 10 mg by mouth every evening.    spironolactone (ALDACTONE) 25 MG tablet Take 25 mg by mouth once daily.    ticagrelor (BRILINTA) 90 mg tablet Take 1 tablet (90 mg total) by mouth 2 (two) times daily.    vitamin D 185 MG Tab Take 185 mg by mouth once daily.    alendronate (FOSAMAX) 70 MG tablet Take 1 tablet (70 mg total) by mouth every 7 days.     Family History     Problem Relation (Age of Onset)    Cancer Mother, Father, Son    Multiple sclerosis Daughter    Stroke Mother        Tobacco Use    Smoking status: Former Smoker     Last attempt to quit:      Years since quittin.0    Smokeless tobacco: Never Used   Substance and Sexual Activity    Alcohol use: Yes     Comment: 3-4 cans of beer daily    Drug use: No    Sexual activity: Not Currently     Review of Systems   Constitutional: Positive for chills and fever. Negative for diaphoresis.   Eyes: Negative for photophobia.   Respiratory: Negative for cough, chest tightness, shortness of breath and wheezing.    Cardiovascular: Negative for chest pain, palpitations and leg swelling.   Gastrointestinal: Negative for abdominal pain, diarrhea, nausea and vomiting.   Genitourinary: Negative for dysuria, flank pain, frequency and hematuria.   Musculoskeletal: Negative for back pain and myalgias.   Neurological: Negative for dizziness, syncope, light-headedness and headaches.   Psychiatric/Behavioral: Negative for confusion.     Objective:     Vital Signs (Most  Recent):  Temp: 97.4 °F (36.3 °C) (12/25/19 0611)  Pulse: 62 (12/25/19 0611)  Resp: 17 (12/25/19 0611)  BP: (!) 141/65 (12/25/19 0611)  SpO2: 96 % (12/24/19 2239) Vital Signs (24h Range):  Temp:  [97.4 °F (36.3 °C)-100.6 °F (38.1 °C)] 97.4 °F (36.3 °C)  Pulse:  [] 62  Resp:  [16-20] 17  SpO2:  [96 %-99 %] 96 %  BP: (138-188)/(65-97) 141/65     Weight: 62 kg (136 lb 11 oz)  Body mass index is 24.21 kg/m².    Physical Exam   Constitutional: She is oriented to person, place, and time. She appears well-developed and well-nourished.   HENT:   Head: Normocephalic and atraumatic.   Eyes: Pupils are equal, round, and reactive to light. Conjunctivae are normal.   Neck: Normal range of motion. No JVD present.   Cardiovascular: Normal rate, regular rhythm, normal heart sounds and intact distal pulses.   Pulmonary/Chest: Effort normal. No respiratory distress. She has no wheezes.   Abdominal: Soft. Bowel sounds are normal. She exhibits no distension. There is no tenderness. There is no guarding.   Musculoskeletal: Normal range of motion. She exhibits no edema or tenderness.   Neurological: She is alert and oriented to person, place, and time.   Skin: Skin is warm and dry. Capillary refill takes less than 2 seconds.   Psychiatric: She has a normal mood and affect. Her behavior is normal.         CRANIAL NERVES     CN III, IV, VI   Pupils are equal, round, and reactive to light.       Significant Labs:   BMP:   Recent Labs   Lab 12/25/19  0411   *      K 3.6      CO2 18*   BUN 10   CREATININE 0.8   CALCIUM 8.9     CBC:   Recent Labs   Lab 12/24/19  1731 12/25/19  0411   WBC 12.57 8.38   HGB 12.6 11.5*   HCT 38.7 35.6*    261     Urine Studies:   Recent Labs   Lab 12/24/19  1853   COLORU Yellow   APPEARANCEUA Cloudy*   PHUR 7.0   SPECGRAV 1.010   PROTEINUA 2+*   GLUCUA Negative   KETONESU Negative   BILIRUBINUA Negative   OCCULTUA 1+*   NITRITE Negative   UROBILINOGEN Negative   LEUKOCYTESUR 2+*    RBCUA 2   WBCUA >100*   BACTERIA Many*   HYALINECASTS 0       Significant Imaging: I have reviewed all pertinent imaging results/findings within the past 24 hours.    Assessment/Plan:     * Fever, unknown origin  Choledocholithiasis S/p EUS and ERCP 12/20   LFTs and bilirubin with normal limits   Received empiric zosyn and cipro  GI consulted   Keep NPO for now         UTI (urinary tract infection)  Continue ceftriaxone, follow urine culture       Choledocholithiasis        Prediabetes  Blood glucose > 200, check A1C       Essential hypertension  Hyperlipidemia   Coronary artery disease involving native coronary artery s/p PCI   Atrial flutter with rapid ventricular response  Tachy-jonh syndrome  S/p loop recorder placement   Chronic diastolic dysfunction     Taking Apixaban and Brilinta- continue   Continue amlodipine, spironolactone and statin   Irbesartan not on hospital formulary   Monitor tele       VTE Risk Mitigation (From admission, onward)         Ordered     apixaban tablet 5 mg  2 times daily      12/25/19 0652     IP VTE HIGH RISK PATIENT  Once      12/24/19 2256     Place sequential compression device  Until discontinued      12/24/19 2256                   Renetta Pena NP  Department of Hospital Medicine   [facility]

## 2019-12-25 NOTE — SUBJECTIVE & OBJECTIVE
Past Medical History:   Diagnosis Date    Anticoagulant long-term use     Arthritis     Atrial flutter     Stent place in December 2017    CAD (coronary artery disease)     with stent placement    CHF (congestive heart failure)     Encounter for blood transfusion     Gall stones     Hearing loss of both ears     Hyperlipidemia     Hypertension     Hypothyroidism     Osteoporosis, post-menopausal        Past Surgical History:   Procedure Laterality Date    ABLATION N/A 9/5/2018    Procedure: ABLATION;  Surgeon: Damir David MD;  Location: [facility] CATH LAB;  Service: Cardiology;  Laterality: N/A;  SVT, SSS, RFA, OSCAR, +/- Dual PPM, ______, Choice, MB, 3 Prep *Contrast Prep*    APPENDECTOMY      BLADDER SUSPENSION      CORONARY ANGIOPLASTY WITH STENT PLACEMENT      ENDOSCOPIC ULTRASOUND OF UPPER GASTROINTESTINAL TRACT N/A 12/20/2019    Procedure: ULTRASOUND, UPPER GI TRACT, ENDOSCOPIC;  Surgeon: Meredith Ford MD;  Location: [facility];  Service: Endoscopy;  Laterality: N/A;    ERCP  12/20/2019    ERCP N/A 12/20/2019    Procedure: ERCP (ENDOSCOPIC RETROGRADE CHOLANGIOPANCREATOGRAPHY);  Surgeon: Meredith Ford MD;  Location: [facility];  Service: Endoscopy;  Laterality: N/A;    HYSTERECTOMY      INSERTION OF IMPLANTABLE LOOP RECORDER N/A 8/26/2019    Procedure: Insertion, Implantable Loop Recorder;  Surgeon: Clifton Panda MD;  Location: [facility] CATH LAB;  Service: Cardiology;  Laterality: N/A;    LEFT HEART CATHETERIZATION Left 6/20/2019    Procedure: Left heart cath;  Surgeon: Clifton Panda MD;  Location: [facility] CATH LAB;  Service: Cardiology;  Laterality: Left;    TONSILLECTOMY         Review of patient's allergies indicates:   Allergen Reactions    Iodine and iodide containing products Nausea And Vomiting, Swelling and Rash    Shellfish containing products Diarrhea    Anectine [succinylcholine chloride]      Family history of issues (multiple family members)    Fish  containing products     Pork/porcine containing products     Squash        Current Facility-Administered Medications on File Prior to Encounter   Medication    [COMPLETED] ciprofloxacin (CIPRO)400mg/200ml D5W IVPB 400 mg    [COMPLETED] dexamethasone injection 8 mg    [COMPLETED] diphenhydrAMINE injection 25 mg    [COMPLETED] iohexol (OMNIPAQUE 350) injection 75 mL    [COMPLETED] piperacillin-tazobactam 4.5 g in dextrose 5 % 100 mL IVPB (ready to mix system)    [COMPLETED] sodium chloride 0.9% bolus 1,797 mL    [DISCONTINUED] piperacillin-tazobactam 4.5 g in dextrose 5 % 100 mL IVPB (ready to mix system)     Current Outpatient Medications on File Prior to Encounter   Medication Sig    acetaminophen (TYLENOL) 325 MG tablet Take 325 mg by mouth daily as needed for Pain.    amLODIPine (NORVASC) 2.5 MG tablet Take 2.5 mg by mouth once daily.    apixaban (ELIQUIS) 5 mg Tab Take 5 mg by mouth 2 (two) times daily.    COD LIVER OIL ORAL Take 1 capsule by mouth once daily.     folic acid (FOLVITE) 400 MCG tablet Take 400 mcg by mouth once daily.    irbesartan (AVAPRO) 150 MG tablet Take 1 tablet (150 mg total) by mouth once daily. (Patient taking differently: Take 150 mg by mouth every evening. )    levothyroxine (SYNTHROID) 75 MCG tablet Take 1 tablet (75 mcg total) by mouth once daily.    rosuvastatin (CRESTOR) 40 MG Tab Take 10 mg by mouth every evening.    spironolactone (ALDACTONE) 25 MG tablet Take 25 mg by mouth once daily.    ticagrelor (BRILINTA) 90 mg tablet Take 1 tablet (90 mg total) by mouth 2 (two) times daily.    vitamin D 185 MG Tab Take 185 mg by mouth once daily.    alendronate (FOSAMAX) 70 MG tablet Take 1 tablet (70 mg total) by mouth every 7 days.     Family History     Problem Relation (Age of Onset)    Cancer Mother, Father, Son    Multiple sclerosis Daughter    Stroke Mother        Tobacco Use    Smoking status: Former Smoker     Last attempt to quit: 1971     Years since  quittin.0    Smokeless tobacco: Never Used   Substance and Sexual Activity    Alcohol use: Yes     Comment: 3-4 cans of beer daily    Drug use: No    Sexual activity: Not Currently     Review of Systems   Constitutional: Negative for chills, diaphoresis and fever.   Eyes: Negative for photophobia.   Respiratory: Negative for cough, chest tightness, shortness of breath and wheezing.    Cardiovascular: Negative for chest pain, palpitations and leg swelling.   Gastrointestinal: Positive for abdominal pain. Negative for diarrhea, nausea and vomiting.   Genitourinary: Negative for dysuria, flank pain, frequency and hematuria.   Musculoskeletal: Negative for back pain and myalgias.   Neurological: Negative for dizziness, syncope, light-headedness and headaches.   Psychiatric/Behavioral: Negative for agitation and confusion. The patient is not nervous/anxious.      Objective:     Vital Signs (Most Recent):  Temp: 97.1 °F (36.2 °C) (19 1214)  Pulse: 72 (19 1214)  Resp: 18 (19 1214)  BP: (!) 175/77 (19 1214)  SpO2: 96 % (19 2239) Vital Signs (24h Range):  Temp:  [97.1 °F (36.2 °C)-100.6 °F (38.1 °C)] 97.1 °F (36.2 °C)  Pulse:  [] 72  Resp:  [16-20] 18  SpO2:  [96 %-99 %] 96 %  BP: (138-188)/(65-97) 175/77     Weight: 62 kg (136 lb 11 oz)  Body mass index is 24.21 kg/m².    Physical Exam   Constitutional: She is oriented to person, place, and time. She appears well-developed and well-nourished.   HENT:   Head: Normocephalic and atraumatic.   Eyes: Conjunctivae and EOM are normal.   Neck: Normal range of motion. No JVD present.   Cardiovascular: Normal rate and regular rhythm.   Pulmonary/Chest: Effort normal. No respiratory distress.   Abdominal: Soft. Bowel sounds are normal. She exhibits no distension. There is tenderness (epigastric). There is no guarding.   Musculoskeletal: Normal range of motion. She exhibits no edema or tenderness.   Neurological: She is alert and oriented to  person, place, and time.   Skin: Skin is warm and dry.   Psychiatric: She has a normal mood and affect. Her behavior is normal. Judgment and thought content normal.         CRANIAL NERVES     CN III, IV, VI   Extraocular motions are normal.        Significant Labs:   Recent Labs   Lab 12/24/19 1731 12/25/19 0411   WBC 12.57 8.38   HGB 12.6 11.5*   HCT 38.7 35.6*    261     Recent Labs   Lab 12/24/19 1731 12/25/19 0411    140   K 3.8 3.6    108   CO2 25 18*   BUN 12 10   CREATININE 0.76 0.8   * 226*   CALCIUM 9.3 8.9     Recent Labs   Lab 12/24/19 1731 12/25/19 0411   ALKPHOS 186* 144*   ALT 32 22   AST 25 14   ALBUMIN 4.0 2.7*   PROT 7.8 6.7   BILITOT 0.6 0.4      No results for input(s): CPK, CPKMB, MB, TROPONINI in the last 72 hours.  No results for input(s): POCTGLUCOSE in the last 168 hours.  Hemoglobin A1C   Date Value Ref Range Status   12/25/2019 6.6 (H) 4.0 - 5.6 % Final     Comment:     ADA Screening Guidelines:  5.7-6.4%  Consistent with prediabetes  >or=6.5%  Consistent with diabetes  High levels of fetal hemoglobin interfere with the HbA1C  assay. Heterozygous hemoglobin variants (HbS, HgC, etc)do  not significantly interfere with this assay.   However, presence of multiple variants may affect accuracy.     05/11/2017 6.2 4.5 - 6.2 % Final     Comment:     According to ADA guidelines, hemoglobin A1C <7.0% represents  optimal control in non-pregnant diabetic patients.  Different  metrics may apply to specific populations.   Standards of Medical Care in Diabetes - 2016.  For the purpose of screening for the presence of diabetes:  <5.7%     Consistent with the absence of diabetes  5.7-6.4%  Consistent with increasing risk for diabetes   (prediabetes)  >or=6.5%  Consistent with diabetes  Currently no consensus exists for use of hemoglobin A1C  for diagnosis of diabetes for children.     03/28/2016 6.1 (H) <5.7 Final     Comment:     Units:  % of total Hgb  According to ADA  guidelines, hemoglobin A1c <7.0%  represents optimal control in non-pregnant diabetic  patients. Different metrics may apply to specific  patient populations. Standards of Medical Care in  Diabetes-2013. Diabetes Care. 2013;36:s11-s66  For the purpose of screening for the presence of  diabetes  <5.7%       Consistent with the absence of diabetes  5.7-6.4%    Consistent with increased risk for diabetes  (prediabetes)  >or=6.5%    Consistent with diabetes  This assay result is consistent with a higher risk  of diabetes.  Currently, no consensus exists for use of hemoglobin  A1c for diagnosis of diabetes for children.  Test Performed at:  [facility] [place]  [address]     CATRACHO CHAWLA MD       Scheduled Meds:   amLODIPine  2.5 mg Oral Daily    [START ON 12/27/2019] apixaban  5 mg Oral BID    cefTRIAXone (ROCEPHIN) IVPB  1 g Intravenous Q24H    folic acid  1,000 mcg Oral Daily    levothyroxine  75 mcg Oral Before breakfast    rosuvastatin  10 mg Oral QHS    spironolactone  25 mg Oral Daily    [START ON 12/27/2019] ticagrelor  90 mg Oral BID     Continuous Infusions:   lactated ringers 75 mL/hr at 12/24/19 5728     As Needed: acetaminophen, ondansetron, sodium chloride 0.9%    Significant Imaging: I have reviewed all pertinent imaging results/findings within the past 24 hours.

## 2019-12-25 NOTE — PLAN OF CARE
Patient is AAO X 4. Denies pain at present. Remains NPO. IV fluids in progress according to MAR. Daughter at bedside. Slept fairly well during night. Will continue to monitor patient.

## 2019-12-25 NOTE — CONSULTS
[facility] Consult Note    CC: abd pain    HPI: 84 y.o. female with acute, intermittent, low grade fever a/w decreased appetite. Recent choledocholithiasis and EUS/ERCP on  here, with stone extraction. Presented to outside hospital with fever, transferred here for further eval. Denies any nausea, vomiting, significant abd pain.   On eliquis, brilinta last dose yesterday.       Previous medical records reviewed and summarized above.     Past Medical History   has a past medical history of Anticoagulant long-term use, Arthritis, Atrial flutter, CAD (coronary artery disease), CHF (congestive heart failure), Encounter for blood transfusion, Gall stones, Hearing loss of both ears, Hyperlipidemia, Hypertension, Hypothyroidism, and Osteoporosis, post-menopausal.    Past Surgical History   has a past surgical history that includes Hysterectomy; Appendectomy; Tonsillectomy; Bladder suspension; Coronary angioplasty with stent; Ablation (N/A, 2018); Left heart catheterization (Left, 2019); Insertion of implantable loop recorder (N/A, 2019); ERCP (2019); Endoscopic ultrasound of upper gastrointestinal tract (N/A, 2019); and ERCP (N/A, 2019).    Social History  Social History     Tobacco Use    Smoking status: Former Smoker     Last attempt to quit: 1971     Years since quittin.0    Smokeless tobacco: Never Used   Substance Use Topics    Alcohol use: Yes     Comment: 3-4 cans of beer daily    Drug use: No     Family History  Family History   Problem Relation Age of Onset    Stroke Mother     Cancer Mother         breast cancer    Cancer Father     Multiple sclerosis Daughter     Cancer Son         colon and liver     Review of Systems  General ROS: negative for chills, fever or weight loss  Psychological ROS: negative for hallucination, depression or suicidal ideation  Ophthalmic ROS: negative for blurry vision, photophobia or eye pain  ENT ROS: negative for epistaxis,  "sore throat or rhinorrhea  Respiratory ROS: no cough, shortness of breath, or wheezing  Cardiovascular ROS: no chest pain or dyspnea on exertion  Gastrointestinal ROS: no abdominal pain, change in bowel habits, or black/ bloody stools  Genito-Urinary ROS: no dysuria, trouble voiding, or hematuria  Musculoskeletal ROS: negative for gait disturbance or muscular weakness  Neurological ROS: no syncope or seizures; no ataxia  Dermatological ROS: negative for pruritis, rash and jaundice    Physical Examination  BP (!) 158/84   Pulse 62   Temp 97.4 °F (36.3 °C) (Oral)   Resp 17   Ht 5' 3" (1.6 m)   Wt 62 kg (136 lb 11 oz)   SpO2 96%   Breastfeeding? No   BMI 24.21 kg/m²     General appearance: alert, cooperative, no distress  HENT: Normocephalic, atraumatic, neck symmetrical, no nasal discharge   Eyes: conjunctivae/corneas clear, PERRL, EOM's intact  Lungs: clear to auscultation in all fields, no dullness to percussion bilaterally, no wheeze  Heart: normal rate, regular rhythm without rub; palpable peripheral pulses  Abdomen: soft, non-tender; bowel sounds normoactive; no organomegaly  Extremities: extremities symmetric; no clubbing, cyanosis, or edema  Integument: Skin color, texture, turgor normal; no rashes; hair distrubution normal  Neurologic: Alert and oriented X 3, normal strength, normal coordination  Psychiatric: no pressured speech; normal affect; no evidence of impaired cognition     Labs:  Recent Labs   Lab 12/25/19 0411   WBC 8.38   RBC 3.92*   HGB 11.5*   HCT 35.6*      MCV 91   MCH 29.3   MCHC 32.3     Recent Labs   Lab 12/25/19 0411      K 3.6      CO2 18*   *   BUN 10   CREATININE 0.8     Recent Labs   Lab 12/25/19 0411   PROT 6.7   ALBUMIN 2.7*   BILITOT 0.4   AST 14   ALT 22   ALKPHOS 144*     Lipase normal.     Imaging:  CT abd - small stone in cbd  I have personally reviewed these images.    Assessment:   85 yo F with recent choledocholithiasis s/p ercp with new " fever and imaging showing new choledocho.   Mild AP elevation but no other changes in liver enzymes. Febrile on admit.   Edematous pancreas imaging, normal lipase, minimal pain. May be acute pancreatitis 2/2 ercp.   Given fever would cover for cholangitis for now.     Plan:  ABX - zosyn or cipro/flagyl for now for biliary coverage  May need endoscopic eval prior to surgery, but surgeon may also be able to remove stone intraop  Surgical eval         Fermin Mendenhall MD  [facility]   [phone]

## 2019-12-25 NOTE — PLAN OF CARE
(Physician in Lead of Transfers)   Outside Transfer Acceptance Note / [facility]erral [facility]      Transferring Physician: Dr. Rivera    Accepting Physician: Malvin Murguia MD / Dr. Horn    Date of Acceptance: 12/24/2019    Transferring Facility: [facility]    Reason for Transfer: needs GI     Report from Transferring Physician/Hospital course: 83 y/o female with PMH of recent choledocholithiasis who presented with fever to 101.2 today and abdominal pain. Recently underwent EUS and ERCP at [facility] on 12/20.  CT abd/pelvis done showed gallstones but no cholecystitis but stranding around the entire pancreas concerning for acute pancreatitis. Afebrile and hemodynamically stable. No leukocytosis and normal bilirubin. Given zosyn and cipro. Case was discussed with GI at [facility] and requested transfer for evaluation.       Labs & Radiographs: see EPIC      To Do List:   1) Vitals q15 mins during the 1st hr of arrival then q30 mins during the 2nd hr   2) Telemetry   3) GI / AES consult  4) Continue antibiotics     Malvin Murguia MD  Hospital Medicine Staff

## 2019-12-25 NOTE — PLAN OF CARE
VN rounds:  VN cued into pt's room with pt's permission.  Pt resting in bed in low position with call bell at side, bed alarm set and daughter with pt. Fall risk protocol discussed with pt.  VN instructed to call for assistance.  Pt aware and agreeable.   No acute distress noted.  Pt's chart, labs and vital signs reviewed.  Allowed time for questions.  Will continue to be available and intervene as needed.      12/25/19 1320   Type of Frequent Check   Type Patient Rounds   Safety/Activity   Patient Rounds bed in low position;call light in patient/parent reach;placement of personal items at bedside;toileting offered;visualized patient   Safety Promotion/Fall Prevention assistive device/personal item within reach;Fall Risk reviewed with patient/family;bed alarm set;medications reviewed;side rails raised x 2;upper side rails raised x 2, lower siderails raised x 1 (Peds only)   Safety Precautions emergency equipment at bedside   Positioning   Body Position supine   Head of Bed (HOB) HOB at 20-30 degrees   Positioning/Transfer Devices pillows   Pain/Comfort/Sleep   Preferred Pain Scale FACES (Marcelino-Luz FACES Pain Rating Scale)   Pain Rating: Rest 0 - no pain   POSS (Pasero Opioid-Induced Sed Scale) 1 - Awake and alert   Nausea/Vomiting   Nausea/Vomiting No Nausea and Vomiting   Assessments (Pre/Post)   Level of Consciousness (AVPU) alert

## 2019-12-25 NOTE — PLAN OF CARE
Problem: Adult Inpatient Plan of Care  Goal: Plan of Care Review  Outcome: Ongoing, Progressing  Flowsheets (Taken 12/24/2019 2250)  Plan of Care Reviewed With: patient;daughter     Problem: Fall Injury Risk  Goal: Absence of Fall and Fall-Related Injury  Outcome: Ongoing, Progressing  Intervention: Identify and Manage Contributors to Fall Injury Risk  Flowsheets (Taken 12/24/2019 2250)  Self-Care Promotion: independence encouraged;BADL personal objects within reach;BADL personal routines maintained  Medication Review/Management: high risk medications identified;medications reviewed  Intervention: Promote Injury-Free Environment  Flowsheets (Taken 12/24/2019 2250)  Environmental Safety Modification: assistive device/personal items within reach; clutter free environment maintained; room near unit station     Problem: Pain Acute  Goal: Optimal Pain Control  Outcome: Ongoing, Progressing  Intervention: Develop Pain Management Plan  Flowsheets (Taken 12/24/2019 2250)  Pain Management Interventions: care clustered; diversional activity provided; pain management plan reviewed with patient/caregiver  Intervention: Prevent or Manage Pain  Flowsheets (Taken 12/24/2019 2250)  Sleep/Rest Enhancement: awakenings minimized; family presence promoted; regular sleep/rest pattern promoted; noise level reduced  Sensory Stimulation Regulation: music/television provided for relaxation  Intervention: Optimize Psychosocial Wellbeing  Flowsheets (Taken 12/24/2019 2250)  Supportive Measures: active listening utilized; goal setting facilitated; self-responsibility promoted; problem solving facilitated  Diversional Activities: playroom/recreation room     Problem: Nausea and Vomiting  Goal: Fluid and Electrolyte Balance  Outcome: Ongoing, Progressing  Intervention: Prevent and Manage Nausea and Vomiting  Flowsheets (Taken 12/24/2019 2250)  Nausea/Vomiting Interventions: other (see comments) (denies)  Environmental Support: caregiver  consistency promoted; calm environment promoted; distractions minimized; environmental consistency promoted     Cued into patient's room.  Permission received per patient to turn camera to view patient.  Introduced as VN for night shift that will be working with floor nurse and nursing assistant.  Daughter at bedside.  Educated patient on VN's role in patient care. Plan of care reviewed with patient. Education per flowsheet.  Opportunity given for questions and questions answered.  Admission assessment questions answered.  Denies pain, n/v, and sob.  Instructed to call for assistance.  Will cont to monitor.    Labs, notes, and orders reviewed.

## 2019-12-25 NOTE — HOSPITAL COURSE
12/25 pt seen, doing ok, still some abd pain but asking for some food, case disussed with gen surg, will likely have OR for lap brigido in am tomorrow  12/26 pt is S/P lap choly today, discussed wih gen surg, the pt is doing ok, could be discharge home from surgical point of view, discussed with pt and her daughter that she has a uti and cultures ar not finalized (GNR), and pt cpuld use an extra night here while awaiting the cultures results and to assess her po tolerance after sx   12/27 pt seen, doing better today,had some urine retention, started on flomax, can continue on it for a week, and treating uti with augmentin as outptient, clearwed for dischrge. Will f/u with post op clinic ge surg on 1/9/2020.  All her questions and concerns were addressed with daughter at bedside.  Pt to continue her home meds as usual otherwise

## 2019-12-26 ENCOUNTER — ANESTHESIA (OUTPATIENT)
Dept: SURGERY | Facility: HOSPITAL | Age: 84
DRG: 417 | End: 2019-12-26
Payer: MEDICARE

## 2019-12-26 PROBLEM — R50.9 FEVER, UNKNOWN ORIGIN: Status: RESOLVED | Noted: 2019-12-25 | Resolved: 2019-12-26

## 2019-12-26 LAB
ALBUMIN SERPL BCP-MCNC: 2.8 G/DL (ref 3.5–5.2)
ALP SERPL-CCNC: 127 U/L (ref 55–135)
ALT SERPL W/O P-5'-P-CCNC: 22 U/L (ref 10–44)
ANION GAP SERPL CALC-SCNC: 14 MMOL/L (ref 8–16)
AST SERPL-CCNC: 19 U/L (ref 10–40)
BACTERIA #/AREA URNS HPF: NORMAL /HPF
BASOPHILS # BLD AUTO: 0.01 K/UL (ref 0–0.2)
BASOPHILS NFR BLD: 0.1 % (ref 0–1.9)
BILIRUB SERPL-MCNC: 0.4 MG/DL (ref 0.1–1)
BILIRUB UR QL STRIP: NEGATIVE
BUN SERPL-MCNC: 12 MG/DL (ref 8–23)
CALCIUM SERPL-MCNC: 9.4 MG/DL (ref 8.7–10.5)
CHLORIDE SERPL-SCNC: 110 MMOL/L (ref 95–110)
CLARITY UR: CLEAR
CO2 SERPL-SCNC: 20 MMOL/L (ref 23–29)
COLOR UR: YELLOW
CREAT SERPL-MCNC: 0.8 MG/DL (ref 0.5–1.4)
DIFFERENTIAL METHOD: ABNORMAL
EOSINOPHIL # BLD AUTO: 0 K/UL (ref 0–0.5)
EOSINOPHIL NFR BLD: 0.1 % (ref 0–8)
ERYTHROCYTE [DISTWIDTH] IN BLOOD BY AUTOMATED COUNT: 13.6 % (ref 11.5–14.5)
EST. GFR  (AFRICAN AMERICAN): >60 ML/MIN/1.73 M^2
EST. GFR  (NON AFRICAN AMERICAN): >60 ML/MIN/1.73 M^2
GLUCOSE SERPL-MCNC: 140 MG/DL (ref 70–110)
GLUCOSE UR QL STRIP: NEGATIVE
HCT VFR BLD AUTO: 37.4 % (ref 37–48.5)
HGB BLD-MCNC: 12 G/DL (ref 12–16)
HGB UR QL STRIP: NEGATIVE
KETONES UR QL STRIP: NEGATIVE
LEUKOCYTE ESTERASE UR QL STRIP: ABNORMAL
LYMPHOCYTES # BLD AUTO: 1.4 K/UL (ref 1–4.8)
LYMPHOCYTES NFR BLD: 13.2 % (ref 18–48)
MCH RBC QN AUTO: 29.1 PG (ref 27–31)
MCHC RBC AUTO-ENTMCNC: 32.1 G/DL (ref 32–36)
MCV RBC AUTO: 91 FL (ref 82–98)
MICROSCOPIC COMMENT: NORMAL
MONOCYTES # BLD AUTO: 1.1 K/UL (ref 0.3–1)
MONOCYTES NFR BLD: 10.8 % (ref 4–15)
NEUTROPHILS # BLD AUTO: 8 K/UL (ref 1.8–7.7)
NEUTROPHILS NFR BLD: 75.8 % (ref 38–73)
NITRITE UR QL STRIP: NEGATIVE
PH UR STRIP: 6 [PH] (ref 5–8)
PLATELET # BLD AUTO: 313 K/UL (ref 150–350)
PMV BLD AUTO: 10 FL (ref 9.2–12.9)
POTASSIUM SERPL-SCNC: 3.4 MMOL/L (ref 3.5–5.1)
PROT SERPL-MCNC: 6.6 G/DL (ref 6–8.4)
PROT UR QL STRIP: NEGATIVE
RBC # BLD AUTO: 4.13 M/UL (ref 4–5.4)
RBC #/AREA URNS HPF: 2 /HPF (ref 0–4)
SODIUM SERPL-SCNC: 144 MMOL/L (ref 136–145)
SP GR UR STRIP: <=1.005 (ref 1–1.03)
URN SPEC COLLECT METH UR: ABNORMAL
UROBILINOGEN UR STRIP-ACNC: NEGATIVE EU/DL
WBC # BLD AUTO: 10.57 K/UL (ref 3.9–12.7)
WBC #/AREA URNS HPF: 3 /HPF (ref 0–5)

## 2019-12-26 PROCEDURE — 37000008 HC ANESTHESIA 1ST 15 MINUTES: Performed by: SURGERY

## 2019-12-26 PROCEDURE — 27201423 OPTIME MED/SURG SUP & DEVICES STERILE SUPPLY: Performed by: SURGERY

## 2019-12-26 PROCEDURE — 88304 PR  SURG PATH,LEVEL III: ICD-10-PCS | Mod: 26,,, | Performed by: PATHOLOGY

## 2019-12-26 PROCEDURE — 88304 TISSUE EXAM BY PATHOLOGIST: CPT | Performed by: PATHOLOGY

## 2019-12-26 PROCEDURE — 88304 TISSUE EXAM BY PATHOLOGIST: CPT | Mod: 26,,, | Performed by: PATHOLOGY

## 2019-12-26 PROCEDURE — 47563 LAPARO CHOLECYSTECTOMY/GRAPH: CPT | Mod: ,,, | Performed by: SURGERY

## 2019-12-26 PROCEDURE — 63600175 PHARM REV CODE 636 W HCPCS: Performed by: SURGERY

## 2019-12-26 PROCEDURE — 25000003 PHARM REV CODE 250: Performed by: ORTHOPAEDIC SURGERY

## 2019-12-26 PROCEDURE — 36000708 HC OR TIME LEV III 1ST 15 MIN: Performed by: SURGERY

## 2019-12-26 PROCEDURE — 36000709 HC OR TIME LEV III EA ADD 15 MIN: Performed by: SURGERY

## 2019-12-26 PROCEDURE — 11000001 HC ACUTE MED/SURG PRIVATE ROOM

## 2019-12-26 PROCEDURE — G0378 HOSPITAL OBSERVATION PER HR: HCPCS

## 2019-12-26 PROCEDURE — 47563 PR LAP,CHOLECYSTECTOMY/GRAPH: ICD-10-PCS | Mod: ,,, | Performed by: SURGERY

## 2019-12-26 PROCEDURE — 85025 COMPLETE CBC W/AUTO DIFF WBC: CPT

## 2019-12-26 PROCEDURE — 25000003 PHARM REV CODE 250: Performed by: NURSE PRACTITIONER

## 2019-12-26 PROCEDURE — 36415 COLL VENOUS BLD VENIPUNCTURE: CPT

## 2019-12-26 PROCEDURE — 80053 COMPREHEN METABOLIC PANEL: CPT

## 2019-12-26 PROCEDURE — C1887 CATHETER, GUIDING: HCPCS | Performed by: SURGERY

## 2019-12-26 PROCEDURE — 25500020 PHARM REV CODE 255: Performed by: SURGERY

## 2019-12-26 PROCEDURE — 81000 URINALYSIS NONAUTO W/SCOPE: CPT

## 2019-12-26 PROCEDURE — 94761 N-INVAS EAR/PLS OXIMETRY MLT: CPT

## 2019-12-26 PROCEDURE — 63600175 PHARM REV CODE 636 W HCPCS: Performed by: ORTHOPAEDIC SURGERY

## 2019-12-26 PROCEDURE — 96374 THER/PROPH/DIAG INJ IV PUSH: CPT

## 2019-12-26 PROCEDURE — 37000009 HC ANESTHESIA EA ADD 15 MINS: Performed by: SURGERY

## 2019-12-26 PROCEDURE — 63600175 PHARM REV CODE 636 W HCPCS: Performed by: NURSE PRACTITIONER

## 2019-12-26 PROCEDURE — 27000221 HC OXYGEN, UP TO 24 HOURS

## 2019-12-26 PROCEDURE — 71000033 HC RECOVERY, INTIAL HOUR: Performed by: SURGERY

## 2019-12-26 PROCEDURE — 25000003 PHARM REV CODE 250: Performed by: SURGERY

## 2019-12-26 RX ORDER — OXYCODONE HYDROCHLORIDE 5 MG/1
5 TABLET ORAL EVERY 6 HOURS PRN
Status: DISCONTINUED | OUTPATIENT
Start: 2019-12-26 | End: 2019-12-27 | Stop reason: HOSPADM

## 2019-12-26 RX ORDER — HYDROMORPHONE HYDROCHLORIDE 2 MG/ML
0.2 INJECTION, SOLUTION INTRAMUSCULAR; INTRAVENOUS; SUBCUTANEOUS EVERY 5 MIN PRN
Status: DISCONTINUED | OUTPATIENT
Start: 2019-12-26 | End: 2019-12-26 | Stop reason: HOSPADM

## 2019-12-26 RX ORDER — NEOSTIGMINE METHYLSULFATE 1 MG/ML
INJECTION, SOLUTION INTRAVENOUS
Status: DISCONTINUED | OUTPATIENT
Start: 2019-12-26 | End: 2019-12-26

## 2019-12-26 RX ORDER — HYDROMORPHONE HYDROCHLORIDE 2 MG/ML
INJECTION, SOLUTION INTRAMUSCULAR; INTRAVENOUS; SUBCUTANEOUS
Status: DISCONTINUED | OUTPATIENT
Start: 2019-12-26 | End: 2019-12-26

## 2019-12-26 RX ORDER — HYDROMORPHONE HYDROCHLORIDE 2 MG/ML
0.5 INJECTION, SOLUTION INTRAMUSCULAR; INTRAVENOUS; SUBCUTANEOUS EVERY 5 MIN PRN
Status: DISCONTINUED | OUTPATIENT
Start: 2019-12-26 | End: 2019-12-26 | Stop reason: HOSPADM

## 2019-12-26 RX ORDER — PROPOFOL 10 MG/ML
VIAL (ML) INTRAVENOUS
Status: DISCONTINUED | OUTPATIENT
Start: 2019-12-26 | End: 2019-12-26

## 2019-12-26 RX ORDER — SODIUM CHLORIDE, SODIUM LACTATE, POTASSIUM CHLORIDE, CALCIUM CHLORIDE 600; 310; 30; 20 MG/100ML; MG/100ML; MG/100ML; MG/100ML
INJECTION, SOLUTION INTRAVENOUS CONTINUOUS PRN
Status: DISCONTINUED | OUTPATIENT
Start: 2019-12-26 | End: 2019-12-26

## 2019-12-26 RX ORDER — PHENYLEPHRINE HYDROCHLORIDE 10 MG/ML
INJECTION INTRAVENOUS
Status: DISCONTINUED | OUTPATIENT
Start: 2019-12-26 | End: 2019-12-26

## 2019-12-26 RX ORDER — GLUCAGON 1 MG
KIT INJECTION
Status: DISCONTINUED | OUTPATIENT
Start: 2019-12-26 | End: 2019-12-26

## 2019-12-26 RX ORDER — GLYCOPYRROLATE 0.2 MG/ML
INJECTION INTRAMUSCULAR; INTRAVENOUS
Status: DISCONTINUED | OUTPATIENT
Start: 2019-12-26 | End: 2019-12-26

## 2019-12-26 RX ORDER — ONDANSETRON 2 MG/ML
4 INJECTION INTRAMUSCULAR; INTRAVENOUS DAILY PRN
Status: DISCONTINUED | OUTPATIENT
Start: 2019-12-26 | End: 2019-12-26 | Stop reason: HOSPADM

## 2019-12-26 RX ORDER — ONDANSETRON 2 MG/ML
INJECTION INTRAMUSCULAR; INTRAVENOUS
Status: DISCONTINUED | OUTPATIENT
Start: 2019-12-26 | End: 2019-12-26

## 2019-12-26 RX ORDER — LIDOCAINE HCL/PF 100 MG/5ML
SYRINGE (ML) INTRAVENOUS
Status: DISCONTINUED | OUTPATIENT
Start: 2019-12-26 | End: 2019-12-26

## 2019-12-26 RX ORDER — ROCURONIUM BROMIDE 10 MG/ML
INJECTION, SOLUTION INTRAVENOUS
Status: DISCONTINUED | OUTPATIENT
Start: 2019-12-26 | End: 2019-12-26

## 2019-12-26 RX ORDER — ACETAMINOPHEN 10 MG/ML
INJECTION, SOLUTION INTRAVENOUS
Status: DISCONTINUED | OUTPATIENT
Start: 2019-12-26 | End: 2019-12-26

## 2019-12-26 RX ORDER — OXYCODONE HYDROCHLORIDE 5 MG/1
5 TABLET ORAL
Status: DISCONTINUED | OUTPATIENT
Start: 2019-12-26 | End: 2019-12-26 | Stop reason: HOSPADM

## 2019-12-26 RX ORDER — ACETAMINOPHEN 500 MG
1000 TABLET ORAL EVERY 8 HOURS
Status: DISCONTINUED | OUTPATIENT
Start: 2019-12-26 | End: 2019-12-27 | Stop reason: HOSPADM

## 2019-12-26 RX ORDER — BUPIVACAINE HYDROCHLORIDE 5 MG/ML
INJECTION, SOLUTION PERINEURAL
Status: DISCONTINUED | OUTPATIENT
Start: 2019-12-26 | End: 2019-12-26 | Stop reason: HOSPADM

## 2019-12-26 RX ORDER — FENTANYL CITRATE 50 UG/ML
INJECTION, SOLUTION INTRAMUSCULAR; INTRAVENOUS
Status: DISCONTINUED | OUTPATIENT
Start: 2019-12-26 | End: 2019-12-26

## 2019-12-26 RX ADMIN — PHENYLEPHRINE HYDROCHLORIDE 100 MCG: 10 INJECTION INTRAVENOUS at 09:12

## 2019-12-26 RX ADMIN — SODIUM CHLORIDE, SODIUM LACTATE, POTASSIUM CHLORIDE, AND CALCIUM CHLORIDE: .6; .31; .03; .02 INJECTION, SOLUTION INTRAVENOUS at 11:12

## 2019-12-26 RX ADMIN — FENTANYL CITRATE 50 MCG: 50 INJECTION, SOLUTION INTRAMUSCULAR; INTRAVENOUS at 07:12

## 2019-12-26 RX ADMIN — AMLODIPINE BESYLATE 2.5 MG: 2.5 TABLET ORAL at 11:12

## 2019-12-26 RX ADMIN — NEOSTIGMINE METHYLSULFATE 5 MG: 1 INJECTION INTRAVENOUS at 09:12

## 2019-12-26 RX ADMIN — ROCURONIUM BROMIDE 10 MG: 10 INJECTION, SOLUTION INTRAVENOUS at 09:12

## 2019-12-26 RX ADMIN — SODIUM CHLORIDE, SODIUM LACTATE, POTASSIUM CHLORIDE, AND CALCIUM CHLORIDE: .6; .31; .03; .02 INJECTION, SOLUTION INTRAVENOUS at 12:12

## 2019-12-26 RX ADMIN — ROCURONIUM BROMIDE 10 MG: 10 INJECTION, SOLUTION INTRAVENOUS at 08:12

## 2019-12-26 RX ADMIN — FENTANYL CITRATE 25 MCG: 50 INJECTION, SOLUTION INTRAMUSCULAR; INTRAVENOUS at 09:12

## 2019-12-26 RX ADMIN — CEFTRIAXONE 1 G: 1 INJECTION, SOLUTION INTRAVENOUS at 12:12

## 2019-12-26 RX ADMIN — LEVOTHYROXINE SODIUM 75 MCG: 75 TABLET ORAL at 06:12

## 2019-12-26 RX ADMIN — CEFTRIAXONE 1 G: 1 INJECTION, SOLUTION INTRAVENOUS at 11:12

## 2019-12-26 RX ADMIN — ACETAMINOPHEN 1000 MG: 10 INJECTION, SOLUTION INTRAVENOUS at 08:12

## 2019-12-26 RX ADMIN — HYDROMORPHONE HYDROCHLORIDE 0.4 MG: 2 INJECTION INTRAMUSCULAR; INTRAVENOUS; SUBCUTANEOUS at 09:12

## 2019-12-26 RX ADMIN — GLUCAGON HYDROCHLORIDE 2 MG: KIT at 08:12

## 2019-12-26 RX ADMIN — PROPOFOL 140 MG: 10 INJECTION, EMULSION INTRAVENOUS at 07:12

## 2019-12-26 RX ADMIN — LIDOCAINE HYDROCHLORIDE 100 MG: 20 INJECTION, SOLUTION INTRAVENOUS at 07:12

## 2019-12-26 RX ADMIN — ACETAMINOPHEN 1000 MG: 500 TABLET ORAL at 09:12

## 2019-12-26 RX ADMIN — GLYCOPYRROLATE 0.8 MG: 0.2 INJECTION, SOLUTION INTRAMUSCULAR; INTRAVENOUS at 09:12

## 2019-12-26 RX ADMIN — LOSARTAN POTASSIUM 50 MG: 50 TABLET ORAL at 09:12

## 2019-12-26 RX ADMIN — ONDANSETRON 4 MG: 2 INJECTION, SOLUTION INTRAMUSCULAR; INTRAVENOUS at 09:12

## 2019-12-26 RX ADMIN — FOLIC ACID 1000 MCG: 1 TABLET ORAL at 11:12

## 2019-12-26 RX ADMIN — SPIRONOLACTONE 25 MG: 25 TABLET ORAL at 11:12

## 2019-12-26 RX ADMIN — PHENYLEPHRINE HYDROCHLORIDE 50 MCG: 10 INJECTION INTRAVENOUS at 08:12

## 2019-12-26 RX ADMIN — ROSUVASTATIN CALCIUM 10 MG: 10 TABLET, COATED ORAL at 09:12

## 2019-12-26 RX ADMIN — ACETAMINOPHEN 1000 MG: 500 TABLET ORAL at 04:12

## 2019-12-26 RX ADMIN — ROCURONIUM BROMIDE 30 MG: 10 INJECTION, SOLUTION INTRAVENOUS at 07:12

## 2019-12-26 RX ADMIN — HYDROMORPHONE HYDROCHLORIDE 0.2 MG: 2 INJECTION INTRAMUSCULAR; INTRAVENOUS; SUBCUTANEOUS at 09:12

## 2019-12-26 RX ADMIN — PHENYLEPHRINE HYDROCHLORIDE 200 MCG: 10 INJECTION INTRAVENOUS at 07:12

## 2019-12-26 RX ADMIN — PHENYLEPHRINE HYDROCHLORIDE 100 MCG: 10 INJECTION INTRAVENOUS at 08:12

## 2019-12-26 RX ADMIN — SODIUM CHLORIDE, SODIUM LACTATE, POTASSIUM CHLORIDE, AND CALCIUM CHLORIDE: .6; .31; .03; .02 INJECTION, SOLUTION INTRAVENOUS at 07:12

## 2019-12-26 RX ADMIN — ACETAMINOPHEN 650 MG: 325 TABLET ORAL at 11:12

## 2019-12-26 NOTE — PLAN OF CARE
Patient A&Ox4. Came back from surgery s/p lap brigido. With c/o minimal pain. Clear liquid diet started and tolerated well. 4 sites with dermabond are clean dry and intact.

## 2019-12-26 NOTE — PLAN OF CARE
Patient is AAO X 4. Elevated BP brought back to normal after giving IV Hydralazine 10 mg. This morning slightly elevated again. Kept her NPO from 12 MN. IV fluids and IV antibiotics given according to MAR. Pre op checklist done. Patient is ready for Surgery. Daughter at bedside. On tele monitor shows NSR. Slept fairly well during night. Will continue to monitor patient.

## 2019-12-26 NOTE — TRANSFER OF CARE
"Anesthesia Transfer of Care Note    Patient: Treasure Rueda    Procedure(s) Performed: Procedure(s) (LRB):  CHOLECYSTECTOMY, LAPAROSCOPIC, WITH CHOLANGIOGRAM (N/A)    Patient location: PACU    Anesthesia Type: general    Transport from OR: Transported from OR on 6-10 L/min O2 by face mask with adequate spontaneous ventilation    Post pain: adequate analgesia    Post assessment: no apparent anesthetic complications and tolerated procedure well    Post vital signs: stable    Level of consciousness: awake    Nausea/Vomiting: no nausea/vomiting    Complications: none    Transfer of care protocol was followed      Last vitals:   Visit Vitals  BP (!) 147/85 (BP Location: Left arm, Patient Position: Lying)   Pulse 76   Temp 36.4 °C (97.5 °F) (Oral)   Resp 17   Ht 5' 3" (1.6 m)   Wt 61 kg (134 lb 7.7 oz)   SpO2 96%   Breastfeeding? No   BMI 23.82 kg/m²     "

## 2019-12-26 NOTE — PROGRESS NOTES
[facility]  PROGRESS NOTE    HPI: Treasure Rueda is a 84 y.o. female with choledochlithaisis    INTERVAL HISTORY: S/p lap brigido with cholangiogram. Small filling defect noted but cleared with flushing.     VITALS:  Temp:  [97.1 °F (36.2 °C)-97.5 °F (36.4 °C)] 97.5 °F (36.4 °C)  Pulse:  [58-96] 59  Resp:  [16-20] 18  SpO2:  [91 %-96 %] 96 %  BP: (133-200)/(62-86) 165/69    ASSESSMENT & PLAN:  84 y.o. female   - CLD ordered, advance as tolerated  - Scheduled tylenol 1000 mg every 8 hours for 5 days, prn oxycodone every 6 hrs, recommend sending home on this regimen   - Ok to re-start anticoagulation tomrrow  - Abx per primary for possible UTI  - Ok for discharge from surgical standpoint  - Will schedule f/u in 2 weeks

## 2019-12-26 NOTE — OP NOTE
DATE OF PROCEDURE: 12/26/2019    PREOPERATIVE DIAGNOSIS:  Choledocholithiasis    POSTOPERATIVE DIAGNOSIS:  Same    PROCEDURE: Laparoscopic cholecystectomy  Intraoperative cholangiogram  SURGEON: Tino Rod M.D    ASSISTANT:  None    ANESTHESIA: General endotracheal    ESTIMATED BLOOD LOSS:  40 cc    SPECIMEN: Gallbladder    CONDITION: Stable    COMPLICATIONS: None    FINDINGS:   1.  Gallbladder distended and edematous with inflammatory adhesions to omentum  2.  Critical view of safety achieved  3.  Cholangiogram performed and showed assist vicious area near the ampulla concerning for filling defect/stone. glucagon 2 mg given and then duct flushed with normal saline.  Repeat cholangiogram showed improvement in the filling defect.  There is easy flow into the small bowel. The proximal biliary system was unremarkable.  4.  Cholecystectomy completed, there was spillage of some bile but no stones    INDICATIONS: The patient is a 84 y.o. female with history of choledocholithiasis status post previous ERCP with sphincterotomy.  She re-presented to the emergency room with fevers and abdominal pain. She had a CT scan concerning for common bile duct stone and cholecystitis.  General surgery and GI were consulted.  We elected take patient back for laparoscopic cholecystectomy with intraop cholangiogram and possible stone extraction.  GI is on board in case unable to remove stone laparoscopically in the could perform ERCP at a later time.  The patient was counseled on their surgical options and desired surgical intervention. The risks of the procedure were described to the patient including pain, infection, bleeding, scarring, wound complications, injury to local structures such as bile duct, liver or intestine, warranting more extensive surgery, retained common bile duct stone or need for further intervention. The patient demonstrated understanding of these risks and a consent form was obtained.    PROCEDURE IN DETAIL:  The patient was identified in the Preoperative Unit and taken back to the Operating Room and laid supine on the operating room table. IV antibiotics were scheduled on the floor. General anesthesia was induced without complication. The patient was then prepped and draped in the standard sterile fashion. A timeout was performed in accordance with hospital protocol.  A small skin nick was made at hubbard's point and a Veress needle was introduced into the abdominal cavity.  Insufflation was attached we had appropriate initial pressures.  Pneumoperitoneum was subsequently achieved. Local anesthetic was injected than a stab incision was sharply made in the RUQ 2. A 5 mm Optiview trocar with a 5 mm 0 degree scope was introduced into the peritoneal cavity.  We examined the trocar and Veress needle insertion sites and no obvious injury was identified. An 11 mm trocar was then placed in subxiphoid region under direct visualization after injecting local anesthetic.  Two additional 5 mm trocars were placed in the periumbilical region and right lateral abdomen under direct visualization again after injecting local anesthetic.  Patient had lower midline adhesions from previous surgery, these were avoided.  The gallbladder was identified and found to edematous with inflammatory adhesions to the omentum.  These adhesions were taken down sharply. The gallbladder fundus was grasped and retracted into the right upper quadrant and the infundibulum retracted laterally. The peritoneum over the infundibulum and cystic structures was then gently stripped until the cystic duct and artery were identified and the critical view of safety was achieved.    We then elected perform a cholangiogram.  A Myers clamp was placed across the cystic duct and the needle introduced into the duct. Cholangiogram was performed.  All images were interpreted by me intraoperatively and pertinent images were stored.  We initially had flow of contrast into the  biliary system.  The common bile duct appeared somewhat dilated.  There was flow of contrast into the proximal biliary system and no obvious abnormality was noted. There appeared to be a small filling defect near the ampulla however contrast was able make it into the small bowel. We gave 2 mg of glucagon and Re shot the cholangiogram.  We now had better flow of contrast into the small bowel but still with an area of filling defect. We flushed the area with normal saline. Repeat cholangiogram showed resolution of the filling defect.  Is likely patient had a small stone which we were able to advance into the small bowel.    A Myers clamp and catheter were then removed.The cystic duct and artery were doubly clipped proximally and once distally and then divided. The gallbladder was then dissected off the gallbladder fossa using electrocautery. Small hole was made in the posterior wall spillage of bile but no stones. Once this was completed, it was placed into an EndoCatch bag and removed through the subxiphoid port. The right upper quadrant was then irrigated and then suctioned. The dissection field was inspected for hemostasis, bile leak and to confirmed clips were in place. Delroy was placed in the dissection field due to the patient's use of anticoagulants. Additional local anesthetic was injected around the port sites under direct visualization.  The ports were removed and the abdomen desulfated.  The 0-Vicryl fascia stitch was placed at the subxiphoid incision. Additional local anesthetic was injected and all the skin incisions were closed using 4-0 Vicryl subcuticular stitch. Dermabond was then applied. Gauze pressure dressings were then applied as well. The patient was awakened from general anesthesia without complication and returned to the Postoperative Recovery Unit in stable condition. At the end of the case, sponge, instrument and needle counts were correct on 2 occasions. I was present and scrubbed  throughout the entirety of the case.

## 2019-12-26 NOTE — SUBJECTIVE & OBJECTIVE
Past Medical History:   Diagnosis Date    Anticoagulant long-term use     Arthritis     Atrial flutter     Stent place in December 2017    CAD (coronary artery disease)     with stent placement    CHF (congestive heart failure)     Encounter for blood transfusion     Gall stones     Hearing loss of both ears     Hyperlipidemia     Hypertension     Hypothyroidism     Osteoporosis, post-menopausal        Past Surgical History:   Procedure Laterality Date    ABLATION N/A 9/5/2018    Procedure: ABLATION;  Surgeon: Damir David MD;  Location: [facility] CATH LAB;  Service: Cardiology;  Laterality: N/A;  SVT, SSS, RFA, OSCAR, +/- Dual PPM, ______, Choice, MB, 3 Prep *Contrast Prep*    APPENDECTOMY      BLADDER SUSPENSION      CORONARY ANGIOPLASTY WITH STENT PLACEMENT      ENDOSCOPIC ULTRASOUND OF UPPER GASTROINTESTINAL TRACT N/A 12/20/2019    Procedure: ULTRASOUND, UPPER GI TRACT, ENDOSCOPIC;  Surgeon: Meredith Ford MD;  Location: [facility];  Service: Endoscopy;  Laterality: N/A;    ERCP  12/20/2019    ERCP N/A 12/20/2019    Procedure: ERCP (ENDOSCOPIC RETROGRADE CHOLANGIOPANCREATOGRAPHY);  Surgeon: Meredith Ford MD;  Location: [facility];  Service: Endoscopy;  Laterality: N/A;    HYSTERECTOMY      INSERTION OF IMPLANTABLE LOOP RECORDER N/A 8/26/2019    Procedure: Insertion, Implantable Loop Recorder;  Surgeon: Clifton Panda MD;  Location: [facility] CATH LAB;  Service: Cardiology;  Laterality: N/A;    LEFT HEART CATHETERIZATION Left 6/20/2019    Procedure: Left heart cath;  Surgeon: Clifton Panda MD;  Location: [facility] CATH LAB;  Service: Cardiology;  Laterality: Left;    TONSILLECTOMY         Review of patient's allergies indicates:   Allergen Reactions    Iodine and iodide containing products Nausea And Vomiting, Swelling and Rash    Shellfish containing products Diarrhea    Anectine [succinylcholine chloride]      Family history of issues (multiple family members)    Fish  containing products     Pork/porcine containing products     Squash        No current facility-administered medications on file prior to encounter.      Current Outpatient Medications on File Prior to Encounter   Medication Sig    acetaminophen (TYLENOL) 325 MG tablet Take 325 mg by mouth daily as needed for Pain.    amLODIPine (NORVASC) 2.5 MG tablet Take 2.5 mg by mouth once daily.    apixaban (ELIQUIS) 5 mg Tab Take 5 mg by mouth 2 (two) times daily.    COD LIVER OIL ORAL Take 1 capsule by mouth once daily.     folic acid (FOLVITE) 400 MCG tablet Take 400 mcg by mouth once daily.    irbesartan (AVAPRO) 150 MG tablet Take 1 tablet (150 mg total) by mouth once daily. (Patient taking differently: Take 150 mg by mouth every evening. )    levothyroxine (SYNTHROID) 75 MCG tablet Take 1 tablet (75 mcg total) by mouth once daily.    rosuvastatin (CRESTOR) 40 MG Tab Take 10 mg by mouth every evening.    spironolactone (ALDACTONE) 25 MG tablet Take 25 mg by mouth once daily.    ticagrelor (BRILINTA) 90 mg tablet Take 1 tablet (90 mg total) by mouth 2 (two) times daily.    vitamin D 185 MG Tab Take 185 mg by mouth once daily.    alendronate (FOSAMAX) 70 MG tablet Take 1 tablet (70 mg total) by mouth every 7 days.     Family History     Problem Relation (Age of Onset)    Cancer Mother, Father, Son    Multiple sclerosis Daughter    Stroke Mother        Tobacco Use    Smoking status: Former Smoker     Last attempt to quit: 1971     Years since quittin.0    Smokeless tobacco: Never Used   Substance and Sexual Activity    Alcohol use: Yes     Comment: 3-4 cans of beer daily    Drug use: No    Sexual activity: Not Currently     Review of Systems   Constitutional: Negative for chills, diaphoresis and fever.   Eyes: Negative for photophobia.   Respiratory: Negative for cough, chest tightness, shortness of breath and wheezing.    Cardiovascular: Negative for chest pain, palpitations and leg swelling.    Gastrointestinal: Positive for abdominal pain (post op at site of incisions). Negative for diarrhea, nausea and vomiting.   Genitourinary: Negative for dysuria and hematuria.   Musculoskeletal: Negative for back pain and myalgias.   Neurological: Negative for dizziness and headaches.   Psychiatric/Behavioral: Negative for agitation and confusion. The patient is not nervous/anxious.      Objective:     Vital Signs (Most Recent):  Temp: 97.4 °F (36.3 °C) (12/26/19 1526)  Pulse: 65 (12/26/19 1548)  Resp: 18 (12/26/19 1526)  BP: 139/64 (12/26/19 1526)  SpO2: 97 % (12/26/19 1426) Vital Signs (24h Range):  Temp:  [97.4 °F (36.3 °C)-97.5 °F (36.4 °C)] 97.4 °F (36.3 °C)  Pulse:  [58-96] 65  Resp:  [16-20] 18  SpO2:  [91 %-97 %] 97 %  BP: (133-200)/(62-86) 139/64     Weight: 61 kg (134 lb 7.7 oz)  Body mass index is 23.82 kg/m².    Physical Exam   Constitutional: She is oriented to person, place, and time. She appears well-developed and well-nourished.   HENT:   Head: Normocephalic and atraumatic.   Eyes: Conjunctivae and EOM are normal.   Neck: Normal range of motion. No JVD present.   Cardiovascular: Normal rate and regular rhythm.   Pulmonary/Chest: Effort normal. No respiratory distress.   Abdominal: Soft. Bowel sounds are normal. She exhibits no distension. There is no guarding.   Surgical site, dressing on   Musculoskeletal: Normal range of motion. She exhibits no edema or tenderness.   Neurological: She is alert and oriented to person, place, and time.   Skin: Skin is warm and dry.   Psychiatric: She has a normal mood and affect. Her behavior is normal. Judgment and thought content normal.         CRANIAL NERVES     CN III, IV, VI   Extraocular motions are normal.        Significant Labs:   Recent Labs   Lab 12/24/19  1731 12/25/19  0411 12/26/19  0510   WBC 12.57 8.38 10.57   HGB 12.6 11.5* 12.0   HCT 38.7 35.6* 37.4    261 313     Recent Labs   Lab 12/24/19  1731 12/25/19  0411 12/26/19  0510    140  144   K 3.8 3.6 3.4*    108 110   CO2 25 18* 20*   BUN 12 10 12   CREATININE 0.76 0.8 0.8   * 226* 140*   CALCIUM 9.3 8.9 9.4     Recent Labs   Lab 12/24/19  1731 12/25/19  0411 12/26/19  0510   ALKPHOS 186* 144* 127   ALT 32 22 22   AST 25 14 19   ALBUMIN 4.0 2.7* 2.8*   PROT 7.8 6.7 6.6   BILITOT 0.6 0.4 0.4      No results for input(s): CPK, CPKMB, MB, TROPONINI in the last 72 hours.  No results for input(s): POCTGLUCOSE in the last 168 hours.  Hemoglobin A1C   Date Value Ref Range Status   12/25/2019 6.6 (H) 4.0 - 5.6 % Final     Comment:     ADA Screening Guidelines:  5.7-6.4%  Consistent with prediabetes  >or=6.5%  Consistent with diabetes  High levels of fetal hemoglobin interfere with the HbA1C  assay. Heterozygous hemoglobin variants (HbS, HgC, etc)do  not significantly interfere with this assay.   However, presence of multiple variants may affect accuracy.     05/11/2017 6.2 4.5 - 6.2 % Final     Comment:     According to ADA guidelines, hemoglobin A1C <7.0% represents  optimal control in non-pregnant diabetic patients.  Different  metrics may apply to specific populations.   Standards of Medical Care in Diabetes - 2016.  For the purpose of screening for the presence of diabetes:  <5.7%     Consistent with the absence of diabetes  5.7-6.4%  Consistent with increasing risk for diabetes   (prediabetes)  >or=6.5%  Consistent with diabetes  Currently no consensus exists for use of hemoglobin A1C  for diagnosis of diabetes for children.     03/28/2016 6.1 (H) <5.7 Final     Comment:     Units:  % of total Hgb  According to ADA guidelines, hemoglobin A1c <7.0%  represents optimal control in non-pregnant diabetic  patients. Different metrics may apply to specific  patient populations. Standards of Medical Care in  Diabetes-2013. Diabetes Care. 2013;36:[id number]  For the purpose of screening for the presence of  diabetes  <5.7%       Consistent with the absence of diabetes  5.7-6.4%    Consistent with  increased risk for diabetes  (prediabetes)  >or=6.5%    Consistent with diabetes  This assay result is consistent with a higher risk  of diabetes.  Currently, no consensus exists for use of hemoglobin  A1c for diagnosis of diabetes for children.  Test Performed at:  [facility] [place]  [address]     CATRACHO CHAWLA MD       Scheduled Meds:   acetaminophen  1,000 mg Oral Q8H    amLODIPine  2.5 mg Oral Daily    cefTRIAXone (ROCEPHIN) IVPB  1 g Intravenous Q24H    folic acid  1,000 mcg Oral Daily    levothyroxine  75 mcg Oral Before breakfast    losartan  50 mg Oral QHS    rosuvastatin  10 mg Oral QHS    spironolactone  25 mg Oral Daily    [START ON 12/27/2019] ticagrelor  90 mg Oral BID     Continuous Infusions:   lactated ringers 75 mL/hr at 12/26/19 0041     As Needed: acetaminophen, hydrALAZINE, melatonin, ondansetron, oxyCODONE, sodium chloride 0.9%  Microbiology Results (last 7 days)     ** No results found for the last 168 hours. **          Significant Imaging: I have reviewed all pertinent imaging results/findings within the past 24 hours.

## 2019-12-26 NOTE — PROGRESS NOTES
[facility] Medicine  Progress Note    Patient Name: Treasure Rueda  MRN: [id number]  Patient Class: OP- Observation   Admission Date: 12/24/2019  Length of Stay: 2 days  Attending Physician: Gopal Horn DO  Primary Care Provider: Primary Doctor Eduarda        Subjective:     Principal Problem:Choledocholithiasis        HPI:  83 y/o female with PMH of hypertension, hyperlipidemia, CAD, paroxysmal atrial fibrillation/aflutter, hx of SVT, chronic diastolic dysfunction, carotid artery stenosis, prediabetes and recent recent choledocholithiasis presented with fever to 101.2 and abdominal pain. Pt recently underwent EUS and ERCP at [place] on 12/20.  CT abd/pelvis done showed gallstones but no cholecystitis but stranding around the entire pancreas concerning for acute pancreatitis. Pt afebrile and hemodynamically stable. No leukocytosis and normal bilirubin. Given zosyn and cipro in ED. Case was discussed with GI. Transfer requested for GI service.     Overview/Hospital Course:  12/25 pt seen, doing ok, still some abd pain but asking for some food, case disussed with gen surg, will likely have OR for lap brigido in am tomorrow  12/226 pt is S/P lap choly today, discussed wi gen surg, the pt is doing ok, could be discharge home from surgical point of view, discussed with pt and her daughter that she has a uti and cultures ar not finalized (GNR), and pt cpuld use an extra night here while awaiting the cultures results and to assess her po tolerance after sx     Past Medical History:   Diagnosis Date    Anticoagulant long-term use     Arthritis     Atrial flutter     Stent place in December 2017    CAD (coronary artery disease)     with stent placement    CHF (congestive heart failure)     Encounter for blood transfusion     Gall stones     Hearing loss of both ears     Hyperlipidemia     Hypertension     Hypothyroidism     Osteoporosis, post-menopausal        Past Surgical  History:   Procedure Laterality Date    ABLATION N/A 9/5/2018    Procedure: ABLATION;  Surgeon: Damir David MD;  Location: [facility] CATH LAB;  Service: Cardiology;  Laterality: N/A;  SVT, SSS, RFA, OSCAR, +/- Dual PPM, ______, Choice, MB, 3 Prep *Contrast Prep*    APPENDECTOMY      BLADDER SUSPENSION      CORONARY ANGIOPLASTY WITH STENT PLACEMENT      ENDOSCOPIC ULTRASOUND OF UPPER GASTROINTESTINAL TRACT N/A 12/20/2019    Procedure: ULTRASOUND, UPPER GI TRACT, ENDOSCOPIC;  Surgeon: Meredith Ford MD;  Location: [facility] ENDO;  Service: Endoscopy;  Laterality: N/A;    ERCP  12/20/2019    ERCP N/A 12/20/2019    Procedure: ERCP (ENDOSCOPIC RETROGRADE CHOLANGIOPANCREATOGRAPHY);  Surgeon: Meredith Ford MD;  Location: [facility] ENDO;  Service: Endoscopy;  Laterality: N/A;    HYSTERECTOMY      INSERTION OF IMPLANTABLE LOOP RECORDER N/A 8/26/2019    Procedure: Insertion, Implantable Loop Recorder;  Surgeon: Clifton Panda MD;  Location: [facility] CATH LAB;  Service: Cardiology;  Laterality: N/A;    LEFT HEART CATHETERIZATION Left 6/20/2019    Procedure: Left heart cath;  Surgeon: Clifton Panda MD;  Location: [facility] CATH LAB;  Service: Cardiology;  Laterality: Left;    TONSILLECTOMY         Review of patient's allergies indicates:   Allergen Reactions    Iodine and iodide containing products Nausea And Vomiting, Swelling and Rash    Shellfish containing products Diarrhea    Anectine [succinylcholine chloride]      Family history of issues (multiple family members)    Fish containing products     Pork/porcine containing products     Squash        No current facility-administered medications on file prior to encounter.      Current Outpatient Medications on File Prior to Encounter   Medication Sig    acetaminophen (TYLENOL) 325 MG tablet Take 325 mg by mouth daily as needed for Pain.    amLODIPine (NORVASC) 2.5 MG tablet Take 2.5 mg by mouth once daily.    apixaban (ELIQUIS) 5 mg Tab Take 5 mg  by mouth 2 (two) times daily.    COD LIVER OIL ORAL Take 1 capsule by mouth once daily.     folic acid (FOLVITE) 400 MCG tablet Take 400 mcg by mouth once daily.    irbesartan (AVAPRO) 150 MG tablet Take 1 tablet (150 mg total) by mouth once daily. (Patient taking differently: Take 150 mg by mouth every evening. )    levothyroxine (SYNTHROID) 75 MCG tablet Take 1 tablet (75 mcg total) by mouth once daily.    rosuvastatin (CRESTOR) 40 MG Tab Take 10 mg by mouth every evening.    spironolactone (ALDACTONE) 25 MG tablet Take 25 mg by mouth once daily.    ticagrelor (BRILINTA) 90 mg tablet Take 1 tablet (90 mg total) by mouth 2 (two) times daily.    vitamin D 185 MG Tab Take 185 mg by mouth once daily.    alendronate (FOSAMAX) 70 MG tablet Take 1 tablet (70 mg total) by mouth every 7 days.     Family History     Problem Relation (Age of Onset)    Cancer Mother, Father, Son    Multiple sclerosis Daughter    Stroke Mother        Tobacco Use    Smoking status: Former Smoker     Last attempt to quit: 1971     Years since quittin.0    Smokeless tobacco: Never Used   Substance and Sexual Activity    Alcohol use: Yes     Comment: 3-4 cans of beer daily    Drug use: No    Sexual activity: Not Currently     Review of Systems   Constitutional: Negative for chills, diaphoresis and fever.   Eyes: Negative for photophobia.   Respiratory: Negative for cough, chest tightness, shortness of breath and wheezing.    Cardiovascular: Negative for chest pain, palpitations and leg swelling.   Gastrointestinal: Positive for abdominal pain (post op at site of incisions). Negative for diarrhea, nausea and vomiting.   Genitourinary: Negative for dysuria and hematuria.   Musculoskeletal: Negative for back pain and myalgias.   Neurological: Negative for dizziness and headaches.   Psychiatric/Behavioral: Negative for agitation and confusion. The patient is not nervous/anxious.      Objective:     Vital Signs (Most Recent):  Temp:  97.4 °F (36.3 °C) (12/26/19 1526)  Pulse: 65 (12/26/19 1548)  Resp: 18 (12/26/19 1526)  BP: 139/64 (12/26/19 1526)  SpO2: 97 % (12/26/19 1426) Vital Signs (24h Range):  Temp:  [97.4 °F (36.3 °C)-97.5 °F (36.4 °C)] 97.4 °F (36.3 °C)  Pulse:  [58-96] 65  Resp:  [16-20] 18  SpO2:  [91 %-97 %] 97 %  BP: (133-200)/(62-86) 139/64     Weight: 61 kg (134 lb 7.7 oz)  Body mass index is 23.82 kg/m².    Physical Exam   Constitutional: She is oriented to person, place, and time. She appears well-developed and well-nourished.   HENT:   Head: Normocephalic and atraumatic.   Eyes: Conjunctivae and EOM are normal.   Neck: Normal range of motion. No JVD present.   Cardiovascular: Normal rate and regular rhythm.   Pulmonary/Chest: Effort normal. No respiratory distress.   Abdominal: Soft. Bowel sounds are normal. She exhibits no distension. There is no guarding.   Surgical site, dressing on   Musculoskeletal: Normal range of motion. She exhibits no edema or tenderness.   Neurological: She is alert and oriented to person, place, and time.   Skin: Skin is warm and dry.   Psychiatric: She has a normal mood and affect. Her behavior is normal. Judgment and thought content normal.         CRANIAL NERVES     CN III, IV, VI   Extraocular motions are normal.        Significant Labs:   Recent Labs   Lab 12/24/19  1731 12/25/19  0411 12/26/19  0510   WBC 12.57 8.38 10.57   HGB 12.6 11.5* 12.0   HCT 38.7 35.6* 37.4    261 313     Recent Labs   Lab 12/24/19  1731 12/25/19  0411 12/26/19  0510    140 144   K 3.8 3.6 3.4*    108 110   CO2 25 18* 20*   BUN 12 10 12   CREATININE 0.76 0.8 0.8   * 226* 140*   CALCIUM 9.3 8.9 9.4     Recent Labs   Lab 12/24/19  1731 12/25/19  0411 12/26/19  0510   ALKPHOS 186* 144* 127   ALT 32 22 22   AST 25 14 19   ALBUMIN 4.0 2.7* 2.8*   PROT 7.8 6.7 6.6   BILITOT 0.6 0.4 0.4      No results for input(s): CPK, CPKMB, MB, TROPONINI in the last 72 hours.  No results for input(s): POCTGLUCOSE  in the last 168 hours.  Hemoglobin A1C   Date Value Ref Range Status   12/25/2019 6.6 (H) 4.0 - 5.6 % Final     Comment:     ADA Screening Guidelines:  5.7-6.4%  Consistent with prediabetes  >or=6.5%  Consistent with diabetes  High levels of fetal hemoglobin interfere with the HbA1C  assay. Heterozygous hemoglobin variants (HbS, HgC, etc)do  not significantly interfere with this assay.   However, presence of multiple variants may affect accuracy.     05/11/2017 6.2 4.5 - 6.2 % Final     Comment:     According to ADA guidelines, hemoglobin A1C <7.0% represents  optimal control in non-pregnant diabetic patients.  Different  metrics may apply to specific populations.   Standards of Medical Care in Diabetes - 2016.  For the purpose of screening for the presence of diabetes:  <5.7%     Consistent with the absence of diabetes  5.7-6.4%  Consistent with increasing risk for diabetes   (prediabetes)  >or=6.5%  Consistent with diabetes  Currently no consensus exists for use of hemoglobin A1C  for diagnosis of diabetes for children.     03/28/2016 6.1 (H) <5.7 Final     Comment:     Units:  % of total Hgb  According to ADA guidelines, hemoglobin A1c <7.0%  represents optimal control in non-pregnant diabetic  patients. Different metrics may apply to specific  patient populations. Standards of Medical Care in  Diabetes-2013. Diabetes Care. 2013;36:s11-s66  For the purpose of screening for the presence of  diabetes  <5.7%       Consistent with the absence of diabetes  5.7-6.4%    Consistent with increased risk for diabetes  (prediabetes)  >or=6.5%    Consistent with diabetes  This assay result is consistent with a higher risk  of diabetes.  Currently, no consensus exists for use of hemoglobin  A1c for diagnosis of diabetes for children.  Test Performed at:  [facility] [place]  [address]     CATRACHO CHAWLA MD       Scheduled Meds:   acetaminophen  1,000 mg Oral Q8H    amLODIPine  2.5 mg  Oral Daily    cefTRIAXone (ROCEPHIN) IVPB  1 g Intravenous Q24H    folic acid  1,000 mcg Oral Daily    levothyroxine  75 mcg Oral Before breakfast    losartan  50 mg Oral QHS    rosuvastatin  10 mg Oral QHS    spironolactone  25 mg Oral Daily    [START ON 12/27/2019] ticagrelor  90 mg Oral BID     Continuous Infusions:   lactated ringers 75 mL/hr at 12/26/19 0041     As Needed: acetaminophen, hydrALAZINE, melatonin, ondansetron, oxyCODONE, sodium chloride 0.9%  Microbiology Results (last 7 days)     ** No results found for the last 168 hours. **          Significant Imaging: I have reviewed all pertinent imaging results/findings within the past 24 hours.      Assessment/Plan:      * Choledocholithiasis  Post op today.  Might be dc home in 24 hours,.  F/U with Dr Crocker in 2 weeks      UTI (urinary tract infection)  Continue ceftriaxone  GNR 50-99K  await seisitivity      Atrial flutter with rapid ventricular response  No CP, no palpitations  No acute events      Coronary artery disease involving native coronary artery  Hold apixaban for sx tomorrow      Prediabetes  Blood glucose > 200, check A1C       Essential hypertension  Hyperlipidemia   Coronary artery disease involving native coronary artery s/p PCI   Atrial flutter with rapid ventricular response  Tachy-jonh syndrome  S/p loop recorder placement   Chronic diastolic dysfunction     Taking Apixaban and Brilinta- continue   Continue amlodipine, spironolactone and statin   Irbesartan not on hospital formulary   Monitor tele       VTE Risk Mitigation (From admission, onward)         Ordered     IP VTE HIGH RISK PATIENT  Once      12/24/19 2256     Place sequential compression device  Until discontinued      12/24/19 2256                      Gopal Horn DO  Department of Hospital Medicine   [facility]

## 2019-12-26 NOTE — NURSING
BP rechecked manually . Remains high 200/86 mm of Hg. Patient says she felt very nervous and telling me to come and do it after sometime. Agreed with patient and BP will be rechecked after 15-30 mts.JAMIL Peguero made aware of this.

## 2019-12-26 NOTE — ANESTHESIA POSTPROCEDURE EVALUATION
Anesthesia Post Evaluation    Patient: Treasure Rueda    Procedure(s) Performed: Procedure(s) (LRB):  CHOLECYSTECTOMY, LAPAROSCOPIC, WITH CHOLANGIOGRAM (N/A)    Final Anesthesia Type: general    Patient location during evaluation: PACU  Patient participation: Yes- Able to Participate  Level of consciousness: awake and alert  Post-procedure vital signs: reviewed and stable  Pain management: adequate  Airway patency: patent    PONV status at discharge: No PONV  Anesthetic complications: no      Cardiovascular status: blood pressure returned to baseline  Respiratory status: unassisted  Hydration status: euvolemic            Vitals Value Taken Time   /72 12/26/2019 10:35 AM   Temp 36.4 °C (97.5 °F) 12/26/2019  4:23 AM   Pulse 60 12/26/2019 10:38 AM   Resp 23 12/26/2019 10:38 AM   SpO2 95 % 12/26/2019 10:38 AM   Vitals shown include unvalidated device data.      No case tracking events are documented in the log.      Pain/Carolyn Score: Pain Rating Prior to Med Admin: 0 (12/26/2019 12:42 AM)

## 2019-12-27 VITALS
HEART RATE: 92 BPM | SYSTOLIC BLOOD PRESSURE: 151 MMHG | OXYGEN SATURATION: 92 % | HEIGHT: 63 IN | DIASTOLIC BLOOD PRESSURE: 65 MMHG | WEIGHT: 140 LBS | BODY MASS INDEX: 24.8 KG/M2 | TEMPERATURE: 98 F | RESPIRATION RATE: 17 BRPM

## 2019-12-27 LAB
ALBUMIN SERPL BCP-MCNC: 2.5 G/DL (ref 3.5–5.2)
ALP SERPL-CCNC: 111 U/L (ref 55–135)
ALT SERPL W/O P-5'-P-CCNC: 27 U/L (ref 10–44)
ANION GAP SERPL CALC-SCNC: 14 MMOL/L (ref 8–16)
AST SERPL-CCNC: 27 U/L (ref 10–40)
BASOPHILS # BLD AUTO: 0.01 K/UL (ref 0–0.2)
BASOPHILS NFR BLD: 0.1 % (ref 0–1.9)
BILIRUB SERPL-MCNC: 0.4 MG/DL (ref 0.1–1)
BUN SERPL-MCNC: 14 MG/DL (ref 8–23)
CALCIUM SERPL-MCNC: 8.5 MG/DL (ref 8.7–10.5)
CHLORIDE SERPL-SCNC: 110 MMOL/L (ref 95–110)
CO2 SERPL-SCNC: 19 MMOL/L (ref 23–29)
CREAT SERPL-MCNC: 0.7 MG/DL (ref 0.5–1.4)
DIFFERENTIAL METHOD: ABNORMAL
EOSINOPHIL # BLD AUTO: 0 K/UL (ref 0–0.5)
EOSINOPHIL NFR BLD: 0.2 % (ref 0–8)
ERYTHROCYTE [DISTWIDTH] IN BLOOD BY AUTOMATED COUNT: 14 % (ref 11.5–14.5)
EST. GFR  (AFRICAN AMERICAN): >60 ML/MIN/1.73 M^2
EST. GFR  (NON AFRICAN AMERICAN): >60 ML/MIN/1.73 M^2
GLUCOSE SERPL-MCNC: 128 MG/DL (ref 70–110)
HCT VFR BLD AUTO: 34.7 % (ref 37–48.5)
HGB BLD-MCNC: 11.1 G/DL (ref 12–16)
LYMPHOCYTES # BLD AUTO: 1 K/UL (ref 1–4.8)
LYMPHOCYTES NFR BLD: 9.8 % (ref 18–48)
MCH RBC QN AUTO: 29.2 PG (ref 27–31)
MCHC RBC AUTO-ENTMCNC: 32 G/DL (ref 32–36)
MCV RBC AUTO: 91 FL (ref 82–98)
MONOCYTES # BLD AUTO: 0.9 K/UL (ref 0.3–1)
MONOCYTES NFR BLD: 8.7 % (ref 4–15)
NEUTROPHILS # BLD AUTO: 8.4 K/UL (ref 1.8–7.7)
NEUTROPHILS NFR BLD: 81.2 % (ref 38–73)
PLATELET # BLD AUTO: 273 K/UL (ref 150–350)
PMV BLD AUTO: 9.9 FL (ref 9.2–12.9)
POTASSIUM SERPL-SCNC: 3.3 MMOL/L (ref 3.5–5.1)
PROT SERPL-MCNC: 5.9 G/DL (ref 6–8.4)
RBC # BLD AUTO: 3.8 M/UL (ref 4–5.4)
SODIUM SERPL-SCNC: 143 MMOL/L (ref 136–145)
WBC # BLD AUTO: 10.4 K/UL (ref 3.9–12.7)

## 2019-12-27 PROCEDURE — 25000003 PHARM REV CODE 250: Performed by: NURSE PRACTITIONER

## 2019-12-27 PROCEDURE — 80053 COMPREHEN METABOLIC PANEL: CPT

## 2019-12-27 PROCEDURE — 85025 COMPLETE CBC W/AUTO DIFF WBC: CPT

## 2019-12-27 PROCEDURE — G0378 HOSPITAL OBSERVATION PER HR: HCPCS

## 2019-12-27 PROCEDURE — 94761 N-INVAS EAR/PLS OXIMETRY MLT: CPT

## 2019-12-27 PROCEDURE — 25000003 PHARM REV CODE 250: Performed by: SURGERY

## 2019-12-27 PROCEDURE — 36415 COLL VENOUS BLD VENIPUNCTURE: CPT

## 2019-12-27 RX ORDER — TAMSULOSIN HYDROCHLORIDE 0.4 MG/1
0.4 CAPSULE ORAL DAILY
Qty: 7 CAPSULE | Refills: 0 | Status: ON HOLD | OUTPATIENT
Start: 2019-12-28 | End: 2021-01-01 | Stop reason: HOSPADM

## 2019-12-27 RX ORDER — TAMSULOSIN HYDROCHLORIDE 0.4 MG/1
0.4 CAPSULE ORAL DAILY
Status: DISCONTINUED | OUTPATIENT
Start: 2019-12-27 | End: 2019-12-27 | Stop reason: HOSPADM

## 2019-12-27 RX ORDER — AMOXICILLIN AND CLAVULANATE POTASSIUM 875; 125 MG/1; MG/1
1 TABLET, FILM COATED ORAL EVERY 12 HOURS
Qty: 10 TABLET | Refills: 0 | Status: SHIPPED | OUTPATIENT
Start: 2019-12-27 | End: 2020-01-01

## 2019-12-27 RX ADMIN — LEVOTHYROXINE SODIUM 75 MCG: 75 TABLET ORAL at 05:12

## 2019-12-27 RX ADMIN — TICAGRELOR 90 MG: 90 TABLET ORAL at 09:12

## 2019-12-27 RX ADMIN — TAMSULOSIN HYDROCHLORIDE 0.4 MG: 0.4 CAPSULE ORAL at 01:12

## 2019-12-27 RX ADMIN — ACETAMINOPHEN 1000 MG: 500 TABLET ORAL at 05:12

## 2019-12-27 RX ADMIN — FOLIC ACID 1000 MCG: 1 TABLET ORAL at 09:12

## 2019-12-27 RX ADMIN — SPIRONOLACTONE 25 MG: 25 TABLET ORAL at 09:12

## 2019-12-27 RX ADMIN — AMLODIPINE BESYLATE 2.5 MG: 2.5 TABLET ORAL at 09:12

## 2019-12-27 NOTE — PLAN OF CARE
Discharge order noted, No HH or DME noted. Per pt., she does not have PCP but has been seeing her cardiologist  regularly for medication management. Pt does not want to [place] to set her up with PCP at this time and will schedule her own appts with . Pt has f/u scheduled with . Pt independent prior to admission. Pt uses no DME but has access to RW and WC. Pt's daughter to provide transportation at discharge. Pt getting bedside delivery for her meds.      Discharge rounds on patient. Discussed followup appointments, blue discharge folder, discharge nurse will go over home medications and reasons for medications and final discharge instructions. All patient/caregiver questions answered. Patient verbalized understanding.    Future Appointments   Date Time Provider Department Center   1/7/2020  3:00 PM Tino Rod Jr., MD [facility] [place]        12/27/19 1127   Final Note   Assessment Type Final Discharge Note   Anticipated Discharge Disposition Home   What phone number can be called within the next 1-3 days to see how you are doing after discharge? [phone]   Hospital Follow Up  Appt(s) scheduled? Yes   Discharge plans and expectations educations in teach back method with documentation complete? Yes   Right Care Referral Info   Post Acute Recommendation No Care   Lenard Hollins RN-BC  Transitional Navigator  [phone]

## 2019-12-27 NOTE — PLAN OF CARE
Pain medication given as ordered. Fluids infusing as ordered. Assisted up to the bathroom as needed. Patient has some difficulty urinating bladder scanned and Flomax given per MD order. Four laparoscopic incisions with dressings clean, dry and intact. Remains free from falls, bed alarm in use.

## 2019-12-27 NOTE — SUBJECTIVE & OBJECTIVE
Interval History: POD1 lap brigido with cholangiogram  No acute events overnight.   Doing well.   Minimal pain.   Two episodes of diarrhea.   Tolerated dinner without issue.   Being treated for a UTI by primary team.    Medications:  Continuous Infusions:   lactated ringers 75 mL/hr at 12/26/19 2356     Scheduled Meds:   acetaminophen  1,000 mg Oral Q8H    amLODIPine  2.5 mg Oral Daily    cefTRIAXone (ROCEPHIN) IVPB  1 g Intravenous Q24H    folic acid  1,000 mcg Oral Daily    levothyroxine  75 mcg Oral Before breakfast    losartan  50 mg Oral QHS    rosuvastatin  10 mg Oral QHS    spironolactone  25 mg Oral Daily    tamsulosin  0.4 mg Oral Daily    ticagrelor  90 mg Oral BID     PRN Meds:acetaminophen, hydrALAZINE, melatonin, ondansetron, oxyCODONE, sodium chloride 0.9%     Review of patient's allergies indicates:   Allergen Reactions    Iodine and iodide containing products Nausea And Vomiting, Swelling and Rash    Shellfish containing products Diarrhea    Anectine [succinylcholine chloride]      Family history of issues (multiple family members)    Fish containing products     Pork/porcine containing products     Squash      Objective:     Vital Signs (Most Recent):  Temp: 98.3 °F (36.8 °C) (12/27/19 0727)  Pulse: 75 (12/27/19 0727)  Resp: 17 (12/27/19 0727)  BP: (!) 163/67 (12/27/19 0727)  SpO2: (!) 94 % (12/26/19 1929) Vital Signs (24h Range):  Temp:  [97.4 °F (36.3 °C)-98.6 °F (37 °C)] 98.3 °F (36.8 °C)  Pulse:  [58-96] 75  Resp:  [16-20] 17  SpO2:  [91 %-97 %] 94 %  BP: (139-165)/(64-72) 163/67     Weight: 63.5 kg (139 lb 15.9 oz)  Body mass index is 24.8 kg/m².    Intake/Output - Last 3 Shifts       12/25 0700 - 12/26 0659 12/26 0700 - 12/27 0659 12/27 0700 - 12/28 0659    P.O. 195 520     I.V. (mL/kg) 1348.8 (22.1) 3587.5 (56.5)     IV Piggyback  100     Total Intake(mL/kg) 1543.8 (25.3) 4207.5 (66.3)     Urine (mL/kg/hr) 3900 (2.7) 925 (0.6)     Emesis/NG output       Other       Stool  0      Total Output 3900 925     Net -2356.3 +3282.5            Urine Occurrence 2 x      Stool Occurrence  2 x         Physical Exam   Constitutional: She is oriented to person, place, and time. She appears well-developed and well-nourished. No distress.   HENT:   Head: Normocephalic and atraumatic.   Eyes: No scleral icterus.   Cardiovascular: Normal rate.   Pulmonary/Chest: Effort normal and breath sounds normal. No stridor. No respiratory distress.   Abdominal: Soft. She exhibits no distension. There is tenderness (appropriate, 4 lap incision sites ). There is no rebound and no guarding.   Musculoskeletal: She exhibits no edema.   Neurological: She is alert and oriented to person, place, and time.   Skin: Skin is warm and dry. Capillary refill takes less than 2 seconds.   Psychiatric: She has a normal mood and affect.   Nursing note and vitals reviewed.    Significant Labs:  CBC:   Recent Labs   Lab 12/27/19  0443   WBC 10.40   RBC 3.80*   HGB 11.1*   HCT 34.7*      MCV 91   MCH 29.2   MCHC 32.0     CMP:   Recent Labs   Lab 12/27/19  0443   *   CALCIUM 8.5*   ALBUMIN 2.5*   PROT 5.9*      K 3.3*   CO2 19*      BUN 14   CREATININE 0.7   ALKPHOS 111   ALT 27   AST 27   BILITOT 0.4     Significant Diagnostics:  I have reviewed and interpreted all pertinent imaging results/findings within the past 24 hours.

## 2019-12-27 NOTE — ASSESSMENT & PLAN NOTE
Component 3d ago   Urine Culture, Routine Abnormal    KLEBSIELLA PNEUMONIAE   50,000 - 99,999 cfu/ml     Resulting Agency [facility]   Susceptibility      Klebsiella pneumoniae     CULTURE, URINE     Amox/K Clav'ate <=8/4 mcg/mL Sensitive     Amp/Sulbactam <=8/4 mcg/mL Sensitive     Cefazolin <=2 mcg/mL Sensitive     Cefepime <=2 mcg/mL Sensitive     Ceftriaxone <=1 mcg/mL Sensitive     Ciprofloxacin <=1 mcg/mL Sensitive     Ertapenem <=0.5 mcg/mL Sensitive     Gentamicin <=4 mcg/mL Sensitive     Levofloxacin <=2 mcg/mL Sensitive     Meropenem <=1 mcg/mL Sensitive     Nitrofurantoin <=32 mcg/mL Sensitive     Piperacillin/Tazo <=16 mcg/mL Sensitive     Tetracycline <=4 mcg/mL Sensitive     Tobramycin <=4 mcg/mL Sensitive     Trimeth/Sulfa <=2/38 mcg/mL Sensitive            Linear View

## 2019-12-27 NOTE — PROGRESS NOTES
Notified ASHLEY Pena patient voided a small amount of urine about 25 cc's but still having difficulty. Bladder scanned at Midnight shows 386. Request a dose of Flomax or I&O cath. Dose of Flomax ordered and hold off on the cath unless patient complaints for now. Will continue to monitor.

## 2019-12-27 NOTE — ASSESSMENT & PLAN NOTE
S/p laparoscopic cholecystectomy with cholangiogram 12/26/19    CHOLEDOCHOLITHIASIS  - okay for regular diet     ACUTE PANCREATITIS  - likely related to choledocholithiasis  - currently without any significant abdominal pain, nausea or vomiting, tolerating diet      CORONARY ARTERY DISEASE  - stents placed in June  - okay to re-start antiplatelet therapy from out standpoint     Dispo: stable for discharge with two week follow-up from a surgical perspective

## 2019-12-27 NOTE — DISCHARGE INSTRUCTIONS
Post-op Instructions:    Please take pain medication as needed, if taking narcotics please avoid driving.   We recommend a high fiber diet and use of stool softeners while on narcotics as they might cause constipation.    Okay to shower   Avoid swimming pools, baths or hot tubs for at least 2 weeks.   Do not lift anything heavier than 10 lb for at least 2 weeks.   Please call if you have fevers, chills, shortness of breath, increased drainage from your wound or bleeding.      Amoxicillin; Clavulanic Acid tablets (English) View Edit Remove  Tamsulosin capsules (English) View Edit Remove  Cholecystectomy (Laparoscopic), Discharge Instructions (English) View Edit Remove  Diet, Discharge Instructions for Eating a Low-Salt (English) View Edit Remove  Herbs and Spices, Using (English) View Edit Remove  Eating Heart-Healthy Foods (English) View Edit Remove

## 2019-12-27 NOTE — PLAN OF CARE
AVS and educational attachments shared with patient and daughter via [employer]. Discussed thoroughly. Patient and daughter verbalized clear understanding using teach back method. Notified bedside nurse of completion of education. At present no distress noted. Patient to be discharged via w/c with escort service and family with all of patient's belonings. Will cont to be available to patient and family and intervene prn.

## 2019-12-27 NOTE — PROGRESS NOTES
[facility]  General Surgery  Progress Note    Subjective:     Post-Op Info:  Procedure(s) (LRB):  CHOLECYSTECTOMY, LAPAROSCOPIC, WITH CHOLANGIOGRAM (N/A)   1 Day Post-Op     Interval History: POD1 lap brigido with cholangiogram  No acute events overnight.   Doing well.   Minimal pain.   Two episodes of diarrhea.   Tolerated dinner without issue.   Being treated for a UTI by primary team.    Medications:  Continuous Infusions:   lactated ringers 75 mL/hr at 12/26/19 2356     Scheduled Meds:   acetaminophen  1,000 mg Oral Q8H    amLODIPine  2.5 mg Oral Daily    cefTRIAXone (ROCEPHIN) IVPB  1 g Intravenous Q24H    folic acid  1,000 mcg Oral Daily    levothyroxine  75 mcg Oral Before breakfast    losartan  50 mg Oral QHS    rosuvastatin  10 mg Oral QHS    spironolactone  25 mg Oral Daily    tamsulosin  0.4 mg Oral Daily    ticagrelor  90 mg Oral BID     PRN Meds:acetaminophen, hydrALAZINE, melatonin, ondansetron, oxyCODONE, sodium chloride 0.9%     Review of patient's allergies indicates:   Allergen Reactions    Iodine and iodide containing products Nausea And Vomiting, Swelling and Rash    Shellfish containing products Diarrhea    Anectine [succinylcholine chloride]      Family history of issues (multiple family members)    Fish containing products     Pork/porcine containing products     Squash      Objective:     Vital Signs (Most Recent):  Temp: 98.3 °F (36.8 °C) (12/27/19 0727)  Pulse: 75 (12/27/19 0727)  Resp: 17 (12/27/19 0727)  BP: (!) 163/67 (12/27/19 0727)  SpO2: (!) 94 % (12/26/19 1929) Vital Signs (24h Range):  Temp:  [97.4 °F (36.3 °C)-98.6 °F (37 °C)] 98.3 °F (36.8 °C)  Pulse:  [58-96] 75  Resp:  [16-20] 17  SpO2:  [91 %-97 %] 94 %  BP: (139-165)/(64-72) 163/67     Weight: 63.5 kg (139 lb 15.9 oz)  Body mass index is 24.8 kg/m².    Intake/Output - Last 3 Shifts       12/25 0700 - 12/26 0659 12/26 0700 - 12/27 0659 12/27 0700 - 12/28 0659    P.O. 195 520     I.V. (mL/kg)  1348.8 (22.1) 3587.5 (56.5)     IV Piggyback  100     Total Intake(mL/kg) 1543.8 (25.3) 4207.5 (66.3)     Urine (mL/kg/hr) 3900 (2.7) 925 (0.6)     Emesis/NG output       Other       Stool  0     Total Output 3900 925     Net -2356.3 +3282.5            Urine Occurrence 2 x      Stool Occurrence  2 x         Physical Exam   Constitutional: She is oriented to person, place, and time. She appears well-developed and well-nourished. No distress.   HENT:   Head: Normocephalic and atraumatic.   Eyes: No scleral icterus.   Cardiovascular: Normal rate.   Pulmonary/Chest: Effort normal and breath sounds normal. No stridor. No respiratory distress.   Abdominal: Soft. She exhibits no distension. There is tenderness (appropriate, 4 lap incision sites ). There is no rebound and no guarding.   Musculoskeletal: She exhibits no edema.   Neurological: She is alert and oriented to person, place, and time.   Skin: Skin is warm and dry. Capillary refill takes less than 2 seconds.   Psychiatric: She has a normal mood and affect.   Nursing note and vitals reviewed.    Significant Labs:  CBC:   Recent Labs   Lab 12/27/19  0443   WBC 10.40   RBC 3.80*   HGB 11.1*   HCT 34.7*      MCV 91   MCH 29.2   MCHC 32.0     CMP:   Recent Labs   Lab 12/27/19  0443   *   CALCIUM 8.5*   ALBUMIN 2.5*   PROT 5.9*      K 3.3*   CO2 19*      BUN 14   CREATININE 0.7   ALKPHOS 111   ALT 27   AST 27   BILITOT 0.4     Significant Diagnostics:  I have reviewed and interpreted all pertinent imaging results/findings within the past 24 hours.    Assessment/Plan:     * Choledocholithiasis  S/p laparoscopic cholecystectomy with cholangiogram 12/26/19    CHOLEDOCHOLITHIASIS  - okay for regular diet     ACUTE PANCREATITIS  - likely related to choledocholithiasis  - currently without any significant abdominal pain, nausea or vomiting, tolerating diet      CORONARY ARTERY DISEASE  - stents placed in June  - okay to re-start antiplatelet therapy  from out standpoint     Dispo: stable for discharge with two week follow-up from a surgical perspective         Aviva Ty MD  General Surgery  [facility]

## 2019-12-27 NOTE — DISCHARGE SUMMARY
[facility] Hospital Medicine  Discharge Summary      Patient Name: Treasure Rueda  MRN: [id number]  Admission Date: 12/24/2019  Hospital Length of Stay: 2 days  Discharge Date and Time: No discharge date for patient encounter.  Attending Physician: Gopal Horn DO   Discharging Provider: Gopal Horn DO  Primary Care Provider: Primary Doctor No      HPI:   85 y/o female with PMH of hypertension, hyperlipidemia, CAD, paroxysmal atrial fibrillation/aflutter, hx of SVT, chronic diastolic dysfunction, carotid artery stenosis, prediabetes and recent recent choledocholithiasis presented with fever to 101.2 and abdominal pain. Pt recently underwent EUS and ERCP at [place] on 12/20.  CT abd/pelvis done showed gallstones but no cholecystitis but stranding around the entire pancreas concerning for acute pancreatitis. Pt afebrile and hemodynamically stable. No leukocytosis and normal bilirubin. Given zosyn and cipro in ED. Case was discussed with GI. Transfer requested for GI service.     Procedure(s) (LRB):  CHOLECYSTECTOMY, LAPAROSCOPIC, WITH CHOLANGIOGRAM (N/A)      Hospital Course:   12/25 pt seen, doing ok, still some abd pain but asking for some food, case disussed with gen surg, will likely have OR for lap brigido in am tomorrow  12/26 pt is S/P lap choly today, discussed wih gen surg, the pt is doing ok, could be discharge home from surgical point of view, discussed with pt and her daughter that she has a uti and cultures ar not finalized (GNR), and pt cpuld use an extra night here while awaiting the cultures results and to assess her po tolerance after sx   12/27 pt seen, doing better today,had some urine retention, started on flomax, can continue on it for a week, and treating uti with augmentin as outptient, clearwed for dischrge. Will f/u with post op clinic ge surg on 1/9/2020.  All her questions and concerns were addressed with daughter at bedside.  Pt to continue her home meds as  usual otherwise     Consults:   Consults (From admission, onward)        Status Ordering Provider     [facility]  [facility]     Provider:  (Not yet assigned)    ALONSO Acosta     Inpatient consult to Social Work/Case Management  Once     Provider:  (Not yet assigned)    COSME Alexander JR          * Choledocholithiasis  Post op day # 1  F/U with Dr Crocker in 2 weeks      UTI (urinary tract infection)  Component 3d ago   Urine Culture, Routine Abnormal    KLEBSIELLA PNEUMONIAE   50,000 - 99,999 cfu/ml     Resulting Agency [facility]   Susceptibility      Klebsiella pneumoniae     CULTURE, URINE     Amox/K Clav'ate <=8/4 mcg/mL Sensitive     Amp/Sulbactam <=8/4 mcg/mL Sensitive     Cefazolin <=2 mcg/mL Sensitive     Cefepime <=2 mcg/mL Sensitive     Ceftriaxone <=1 mcg/mL Sensitive     Ciprofloxacin <=1 mcg/mL Sensitive     Ertapenem <=0.5 mcg/mL Sensitive     Gentamicin <=4 mcg/mL Sensitive     Levofloxacin <=2 mcg/mL Sensitive     Meropenem <=1 mcg/mL Sensitive     Nitrofurantoin <=32 mcg/mL Sensitive     Piperacillin/Tazo <=16 mcg/mL Sensitive     Tetracycline <=4 mcg/mL Sensitive     Tobramycin <=4 mcg/mL Sensitive     Trimeth/Sulfa <=2/38 mcg/mL Sensitive            Linear View                Final Active Diagnoses:    Diagnosis Date Noted POA    PRINCIPAL PROBLEM:  Choledocholithiasis [K80.50] 12/20/2019 Yes    UTI (urinary tract infection) [N39.0] 12/25/2019 Yes    Atrial flutter with rapid ventricular response [I48.92] 01/08/2018 Yes    Coronary artery disease involving native coronary artery [I25.10] 12/14/2017 Yes    Essential hypertension [I10] 06/10/2013 Yes     Chronic    Hypothyroidism due to acquired atrophy of thyroid [E03.4] 06/10/2013 Yes     Chronic      Problems Resolved During this Admission:       Discharged Condition: stable    Disposition: Home or Self Care    Follow Up:  Follow-up Information     Cosme Rod Jr, MD  On 1/9/2020.    Specialty:  General Surgery  Contact information:  [address] [place] [place] [address]  [phone]             Primary Doctor No.               Patient Instructions:      Diet Cardiac     Notify your health care provider if you experience any of the following:  temperature >100.4     Notify your health care provider if you experience any of the following:  persistent nausea and vomiting or diarrhea     Notify your health care provider if you experience any of the following:  severe uncontrolled pain     Notify your health care provider if you experience any of the following:  increased confusion or weakness     Activity as tolerated       Significant Diagnostic Studies: Labs:   BMP:   Recent Labs   Lab 12/26/19  0510 12/27/19  0443   * 128*    143   K 3.4* 3.3*    110   CO2 20* 19*   BUN 12 14   CREATININE 0.8 0.7   CALCIUM 9.4 8.5*   , CMP   Recent Labs   Lab 12/26/19  0510 12/27/19  0443    143   K 3.4* 3.3*    110   CO2 20* 19*   * 128*   BUN 12 14   CREATININE 0.8 0.7   CALCIUM 9.4 8.5*   PROT 6.6 5.9*   ALBUMIN 2.8* 2.5*   BILITOT 0.4 0.4   ALKPHOS 127 111   AST 19 27   ALT 22 27   ANIONGAP 14 14   ESTGFRAFRICA >60 >60   EGFRNONAA >60 >60    and CBC   Recent Labs   Lab 12/26/19  0510 12/27/19  0443   WBC 10.57 10.40   HGB 12.0 11.1*   HCT 37.4 34.7*    273     Microbiology:   Urine Culture    Lab Results   Component Value Date    LABURIN KLEBSIELLA PNEUMONIAE  50,000 - 99,999 cfu/ml   (A) 12/24/2019     Component 3d ago   Urine Culture, Routine Abnormal    KLEBSIELLA PNEUMONIAE   50,000 - 99,999 cfu/ml     Resulting Agency [facility]   Susceptibility      Klebsiella pneumoniae     CULTURE, URINE     Amox/K Clav'ate <=8/4 mcg/mL Sensitive     Amp/Sulbactam <=8/4 mcg/mL Sensitive     Cefazolin <=2 mcg/mL Sensitive     Cefepime <=2 mcg/mL Sensitive     Ceftriaxone <=1 mcg/mL Sensitive     Ciprofloxacin <=1 mcg/mL Sensitive     Ertapenem <=0.5 mcg/mL  Sensitive     Gentamicin <=4 mcg/mL Sensitive     Levofloxacin <=2 mcg/mL Sensitive     Meropenem <=1 mcg/mL Sensitive     Nitrofurantoin <=32 mcg/mL Sensitive     Piperacillin/Tazo <=16 mcg/mL Sensitive     Tetracycline <=4 mcg/mL Sensitive     Tobramycin <=4 mcg/mL Sensitive     Trimeth/Sulfa <=2/38 mcg/mL Sensitive            Linear View          Pending Diagnostic Studies:     Procedure Component Value Units Date/Time    Specimen to Pathology, Surgery General Surgery [[id number]] Collected:  12/26/19 0952    Order Status:  Sent Lab Status:  In process Updated:  12/26/19 1306         Medications:  Reconciled Home Medications:      Medication List      START taking these medications    amoxicillin-clavulanate 875-125mg 875-125 mg per tablet  Commonly known as:  AUGMENTIN  Take 1 tablet by mouth every 12 (twelve) hours. for 5 days     tamsulosin 0.4 mg Cap  Commonly known as:  FLOMAX  Take 1 capsule (0.4 mg total) by mouth once daily. for 7 days  Start taking on:  December 28, 2019        CHANGE how you take these medications    irbesartan 150 MG tablet  Commonly known as:  Avapro  Take 1 tablet (150 mg total) by mouth once daily.  What changed:  when to take this        CONTINUE taking these medications    acetaminophen 325 MG tablet  Commonly known as:  TYLENOL  Take 325 mg by mouth daily as needed for Pain.     alendronate 70 MG tablet  Commonly known as:  FOSAMAX  Take 1 tablet (70 mg total) by mouth every 7 days.     amLODIPine 2.5 MG tablet  Commonly known as:  NORVASC  Take 2.5 mg by mouth once daily.     COD LIVER OIL ORAL  Take 1 capsule by mouth once daily.     Eliquis 5 mg Tab  Generic drug:  apixaban  Take 5 mg by mouth 2 (two) times daily.     folic acid 400 MCG tablet  Commonly known as:  FOLVITE  Take 400 mcg by mouth once daily.     levothyroxine 75 MCG tablet  Commonly known as:  SYNTHROID  Take 1 tablet (75 mcg total) by mouth once daily.     rosuvastatin 40 MG Tab  Commonly known as:   CRESTOR  Take 10 mg by mouth every evening.     spironolactone 25 MG tablet  Commonly known as:  ALDACTONE  Take 25 mg by mouth once daily.     ticagrelor 90 mg tablet  Commonly known as:  BRILINTA  Take 1 tablet (90 mg total) by mouth 2 (two) times daily.     vitamin D 1000 units Tab  Commonly known as:  VITAMIN D3  Take 185 mg by mouth once daily.            Indwelling Lines/Drains at time of discharge:   Lines/Drains/Airways     None                 Time spent on the discharge of patient: 41 minutes  Patient was seen and examined on the date of discharge and determined to be suitable for discharge.         Gopal Horn DO  Department of Hospital Medicine  [facility]

## 2019-12-27 NOTE — NURSING
Patient voided 100 cc of urine since she was back from operation. Bladder scan revealed 840 of retention. Dr Bruce ordered for in and out cath and u/a. Orders carried out.

## 2020-01-06 LAB
FINAL PATHOLOGIC DIAGNOSIS: NORMAL
GROSS: NORMAL

## 2020-01-07 ENCOUNTER — OFFICE VISIT (OUTPATIENT)
Dept: SURGERY | Facility: CLINIC | Age: 85
End: 2020-01-07
Payer: MEDICARE

## 2020-01-07 VITALS
SYSTOLIC BLOOD PRESSURE: 124 MMHG | OXYGEN SATURATION: 98 % | WEIGHT: 132.06 LBS | BODY MASS INDEX: 23.4 KG/M2 | DIASTOLIC BLOOD PRESSURE: 58 MMHG | HEIGHT: 63 IN | HEART RATE: 73 BPM | TEMPERATURE: 97 F

## 2020-01-07 DIAGNOSIS — Z90.49 S/P LAPAROSCOPIC CHOLECYSTECTOMY: Primary | ICD-10-CM

## 2020-01-07 PROCEDURE — 99024 PR POST-OP FOLLOW-UP VISIT: ICD-10-PCS | Mod: POP,,, | Performed by: SURGERY

## 2020-01-07 PROCEDURE — 99213 OFFICE O/P EST LOW 20 MIN: CPT | Mod: PBBFAC,PN | Performed by: SURGERY

## 2020-01-07 PROCEDURE — 99999 PR PBB SHADOW E&M-EST. PATIENT-LVL III: CPT | Mod: PBBFAC,,, | Performed by: SURGERY

## 2020-01-07 PROCEDURE — 99999 PR PBB SHADOW E&M-EST. PATIENT-LVL III: ICD-10-PCS | Mod: PBBFAC,,, | Performed by: SURGERY

## 2020-01-07 PROCEDURE — 99024 POSTOP FOLLOW-UP VISIT: CPT | Mod: POP,,, | Performed by: SURGERY

## 2020-01-07 NOTE — LETTER
January 7, 2020      Pino Tate MD  [address] [place] [address] [address] [place] [address]           [facility] Surgery  [address], [address]  [place] [address]  Phone: [phone]  Fax: [phone]          Patient: Treasure Rueda   MR Number: [id number]   YOB: 1935   Date of Visit: 1/7/2020       Dear Dr. Pino Tate:    Thank you for referring Treasure Rueda to me for evaluation. Attached you will find relevant portions of my assessment and plan of care.    If you have questions, please do not hesitate to call me. I look forward to following Treasure Rueda along with you.    Sincerely,    Tino Rod Jr., MD    Enclosure  CC:  No Recipients    If you would like to receive this communication electronically, please contact [email or web address] or [phone] to request more information on [employer] Link access.    For providers and/or their staff who would like to refer a patient to [facility], please contact us through our one-stop-shop provider referral line, [facility] [place], at [phone].    If you feel you have received this communication in error or would no longer like to receive these types of communications, please e-mail [email or web address]

## 2020-01-07 NOTE — PROGRESS NOTES
[facility] GENERAL [facility]  POST-OP PROGRESS NOTE    HPI: Treasure Rueda is a 84 y.o. female status post laparoscopic cholecystectomy with cholangiogram for choledocholithiasis.  Here for 2 week follow-up.  Patient overall is doing well.  Reports her pain is well controlled and she only took a few of the narcotic pain pills.  She is able to ambulate but is more easily fatigued.  She is tolerating a regular diet without nausea or vomiting but does have a decreased appetite.  Bowel function was loose initially but is returning to normal.  Has restarted her blood thinners.      VITALS:  Vitals:    01/07/20 1501   BP: (!) 124/58   Pulse: 73   Temp: 97.4 °F (36.3 °C)       PHYSICAL EXAM:  Abdominal port site incisions well-healed without any signs of infection or breakdown, mild resolving ecchymosis present    PATHOLOGY:  1. Gallbladder, cholecystectomy:  - Acute and chronic cholecystitis and cholelithiasis  - Negative for dysplasia or malignancy      ASSESSMENT & PLAN:  84 y.o. female s/p laparoscopic cholecystectomy  - healing well  - path benign  - return to clinic p.r.n.

## 2020-02-12 NOTE — PHYSICIAN QUERY
"PT Name: Treasure Rueda  MR #: [id number]    Physician Query Form - Pathology Findings Clarification   Sandrita Stock RN, CCDS; Desk # [phone]; [email or web address]    This form is a permanent document in the medical record.     Query Date: February 12, 2020    By submitting this query, we are merely seeking further clarification of documentation.  Please utilize your independent clinical judgment when addressing the question(s) below.    The medical record contains the following:     Findings Supporting Clinical Information Location in Medical Record   Path 12/26/19    . Gallbladder, cholecystectomy:  - Acute and chronic cholecystitis and cholelithiasis  - Negative for dysplasia or malignancy    Comment: Interpreted by: Shelley Joseph M.D., Signed on 01/06/2020 at 10:07   Op Note 12/26    pre/post op dx: Choledocholithiasis  Proc: Lap cholecystectomy;   Intraop cholangiogram   Op Note 12/26/19     Please document the clinical significance of the Pathologists findings of:                                "Acute and chronic cholecystitis and cholelithiasis"    [X   ] I agree with the Pathology Findings   [   ] I do not agree with the Pathology Findings   [   ] Other/Clarification of Findings:  _______________   [  ] Clinically Undetermined     Please document in your progress notes daily for the duration of treatment until resolved and include in your discharge summary.                   "

## 2021-01-01 ENCOUNTER — DOCUMENTATION ONLY (OUTPATIENT)
Dept: CARDIOLOGY | Facility: CLINIC | Age: 86
End: 2021-01-01

## 2021-01-01 ENCOUNTER — LAB VISIT (OUTPATIENT)
Dept: SPORTS MEDICINE | Facility: CLINIC | Age: 86
DRG: 034 | End: 2021-01-01
Attending: INTERNAL MEDICINE
Payer: MEDICARE

## 2021-01-01 ENCOUNTER — PATIENT MESSAGE (OUTPATIENT)
Dept: GASTROENTEROLOGY | Facility: CLINIC | Age: 86
End: 2021-01-01

## 2021-01-01 ENCOUNTER — ANESTHESIA (OUTPATIENT)
Dept: ENDOSCOPY | Facility: HOSPITAL | Age: 86
DRG: 378 | End: 2021-01-01
Payer: MEDICARE

## 2021-01-01 ENCOUNTER — TELEPHONE (OUTPATIENT)
Dept: GASTROENTEROLOGY | Facility: CLINIC | Age: 86
End: 2021-01-01

## 2021-01-01 ENCOUNTER — HOSPITAL ENCOUNTER (INPATIENT)
Facility: HOSPITAL | Age: 86
LOS: 1 days | DRG: 034 | End: 2021-10-05
Attending: INTERNAL MEDICINE | Admitting: INTERNAL MEDICINE
Payer: MEDICARE

## 2021-01-01 ENCOUNTER — OFFICE VISIT (OUTPATIENT)
Dept: GASTROENTEROLOGY | Facility: CLINIC | Age: 86
End: 2021-01-01
Payer: MEDICARE

## 2021-01-01 ENCOUNTER — PATIENT OUTREACH (OUTPATIENT)
Dept: ADMINISTRATIVE | Facility: CLINIC | Age: 86
End: 2021-01-01

## 2021-01-01 ENCOUNTER — NURSE TRIAGE (OUTPATIENT)
Dept: ADMINISTRATIVE | Facility: CLINIC | Age: 86
End: 2021-01-01

## 2021-01-01 ENCOUNTER — LAB VISIT (OUTPATIENT)
Dept: LAB | Facility: HOSPITAL | Age: 86
End: 2021-01-01
Attending: INTERNAL MEDICINE
Payer: MEDICARE

## 2021-01-01 ENCOUNTER — OFFICE VISIT (OUTPATIENT)
Dept: CARDIOLOGY | Facility: CLINIC | Age: 86
End: 2021-01-01
Payer: MEDICARE

## 2021-01-01 ENCOUNTER — LAB VISIT (OUTPATIENT)
Dept: SPORTS MEDICINE | Facility: CLINIC | Age: 86
End: 2021-01-01
Payer: MEDICARE

## 2021-01-01 ENCOUNTER — ANESTHESIA EVENT (OUTPATIENT)
Dept: ENDOSCOPY | Facility: HOSPITAL | Age: 86
DRG: 378 | End: 2021-01-01
Payer: MEDICARE

## 2021-01-01 ENCOUNTER — HOSPITAL ENCOUNTER (INPATIENT)
Facility: HOSPITAL | Age: 86
LOS: 1 days | Discharge: HOME | DRG: 378 | End: 2021-05-27
Attending: EMERGENCY MEDICINE | Admitting: HOSPITALIST
Payer: MEDICARE

## 2021-01-01 ENCOUNTER — HOSPITAL ENCOUNTER (OUTPATIENT)
Dept: CARDIOLOGY | Facility: HOSPITAL | Age: 86
Discharge: HOME | End: 2021-08-24
Attending: INTERNAL MEDICINE
Payer: MEDICARE

## 2021-01-01 VITALS — HEIGHT: 63 IN | BODY MASS INDEX: 24.8 KG/M2 | WEIGHT: 140 LBS

## 2021-01-01 VITALS
DIASTOLIC BLOOD PRESSURE: 91 MMHG | HEIGHT: 63 IN | WEIGHT: 136.88 LBS | BODY MASS INDEX: 24.25 KG/M2 | HEART RATE: 59 BPM | OXYGEN SATURATION: 99 % | SYSTOLIC BLOOD PRESSURE: 189 MMHG

## 2021-01-01 VITALS
WEIGHT: 137 LBS | SYSTOLIC BLOOD PRESSURE: 165 MMHG | HEIGHT: 64 IN | TEMPERATURE: 94 F | BODY MASS INDEX: 23.39 KG/M2 | DIASTOLIC BLOOD PRESSURE: 69 MMHG

## 2021-01-01 VITALS
DIASTOLIC BLOOD PRESSURE: 72 MMHG | WEIGHT: 137.13 LBS | RESPIRATION RATE: 17 BRPM | SYSTOLIC BLOOD PRESSURE: 148 MMHG | OXYGEN SATURATION: 97 % | TEMPERATURE: 98 F | HEART RATE: 65 BPM | BODY MASS INDEX: 24.3 KG/M2 | HEIGHT: 63 IN

## 2021-01-01 DIAGNOSIS — K52.9 GASTROENTERITIS: ICD-10-CM

## 2021-01-01 DIAGNOSIS — Z01.818 PRE-OP TESTING: ICD-10-CM

## 2021-01-01 DIAGNOSIS — G93.5 BRAIN COMPRESSION: ICD-10-CM

## 2021-01-01 DIAGNOSIS — R13.10 DYSPHAGIA, UNSPECIFIED TYPE: ICD-10-CM

## 2021-01-01 DIAGNOSIS — R07.9 CHEST PAIN: ICD-10-CM

## 2021-01-01 DIAGNOSIS — I65.23 BILATERAL CAROTID ARTERY STENOSIS: ICD-10-CM

## 2021-01-01 DIAGNOSIS — I48.91 A-FIB: ICD-10-CM

## 2021-01-01 DIAGNOSIS — D64.9 ANEMIA, UNSPECIFIED TYPE: Primary | ICD-10-CM

## 2021-01-01 DIAGNOSIS — I31.39 PERICARDIAL EFFUSION: ICD-10-CM

## 2021-01-01 DIAGNOSIS — I65.21 STENOSIS OF RIGHT CAROTID ARTERY: Primary | ICD-10-CM

## 2021-01-01 DIAGNOSIS — D64.9 ANEMIA, UNSPECIFIED TYPE: ICD-10-CM

## 2021-01-01 DIAGNOSIS — E78.5 HYPERLIPIDEMIA, UNSPECIFIED HYPERLIPIDEMIA TYPE: ICD-10-CM

## 2021-01-01 DIAGNOSIS — R53.81 DEBILITY: ICD-10-CM
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
## 2021-01-01 DIAGNOSIS — I77.1 STRICTURE OF ARTERY: Primary | ICD-10-CM

## 2021-01-01 DIAGNOSIS — E86.0 DEHYDRATION: ICD-10-CM

## 2021-01-01 DIAGNOSIS — I77.1 STRICTURE OF ARTERY: ICD-10-CM

## 2021-01-01 DIAGNOSIS — I63.9 ACUTE CVA (CEREBROVASCULAR ACCIDENT): ICD-10-CM

## 2021-01-01 DIAGNOSIS — G93.5 BRAIN HERNIATION: ICD-10-CM

## 2021-01-01 DIAGNOSIS — I61.5 IVH (INTRAVENTRICULAR HEMORRHAGE): ICD-10-CM

## 2021-01-01 DIAGNOSIS — K92.2 UPPER GI BLEED: Primary | ICD-10-CM

## 2021-01-01 DIAGNOSIS — I61.0 NONTRAUMATIC SUBCORTICAL HEMORRHAGE OF RIGHT CEREBRAL HEMISPHERE: ICD-10-CM

## 2021-01-01 DIAGNOSIS — A09 INFECTIOUS DIARRHEA: ICD-10-CM

## 2021-01-01 DIAGNOSIS — I10 ESSENTIAL HYPERTENSION: Chronic | ICD-10-CM

## 2021-01-01 DIAGNOSIS — Z51.89 ENCOUNTER FOR OTHER SPECIFIED AFTERCARE: ICD-10-CM

## 2021-01-01 LAB
ABO + RH BLD: NORMAL
ABO + RH BLD: NORMAL
ALBUMIN SERPL BCP-MCNC: 3.3 G/DL (ref 3.5–5.2)
ALBUMIN SERPL BCP-MCNC: 3.4 G/DL (ref 3.5–5.2)
ALBUMIN SERPL BCP-MCNC: 3.6 G/DL (ref 3.5–5.2)
ALP SERPL-CCNC: 52 U/L (ref 55–135)
ALP SERPL-CCNC: 56 U/L (ref 55–135)
ALP SERPL-CCNC: 60 U/L (ref 55–135)
ALT SERPL W/O P-5'-P-CCNC: 10 U/L (ref 10–44)
ALT SERPL W/O P-5'-P-CCNC: 8 U/L (ref 10–44)
ALT SERPL W/O P-5'-P-CCNC: 9 U/L (ref 10–44)
AMORPH CRY URNS QL MICRO: ABNORMAL
ANION GAP SERPL CALC-SCNC: 13 MMOL/L (ref 8–16)
ANION GAP SERPL CALC-SCNC: 14 MMOL/L (ref 8–16)
ANION GAP SERPL CALC-SCNC: 7 MMOL/L (ref 8–16)
AORTIC ROOT ANNULUS: 2.97 CM
APTT BLDCRRT: 28.5 SEC (ref 21–32)
ASCENDING AORTA: 2.73 CM
AST SERPL-CCNC: 14 U/L (ref 10–40)
AST SERPL-CCNC: 14 U/L (ref 10–40)
AST SERPL-CCNC: 23 U/L (ref 10–40)
AV INDEX (PROSTH): 0.68
AV MEAN GRADIENT: 6 MMHG
AV PEAK GRADIENT: 9 MMHG
AV VALVE AREA: 2.11 CM2
AV VELOCITY RATIO: 0.77
BACTERIA #/AREA URNS HPF: ABNORMAL /HPF
BACTERIA STL CULT: NORMAL
BASOPHILS # BLD AUTO: 0.01 K/UL (ref 0–0.2)
BASOPHILS # BLD AUTO: 0.02 K/UL (ref 0–0.2)
BASOPHILS # BLD AUTO: 0.02 K/UL (ref 0–0.2)
BASOPHILS # BLD AUTO: 0.03 K/UL (ref 0–0.2)
BASOPHILS # BLD AUTO: 0.03 K/UL (ref 0–0.2)
BASOPHILS # BLD AUTO: 0.04 K/UL (ref 0–0.2)
BASOPHILS NFR BLD: 0.1 % (ref 0–1.9)
BASOPHILS NFR BLD: 0.1 % (ref 0–1.9)
BASOPHILS NFR BLD: 0.2 % (ref 0–1.9)
BILIRUB SERPL-MCNC: 0.2 MG/DL (ref 0.1–1)
BILIRUB SERPL-MCNC: 0.2 MG/DL (ref 0.1–1)
BILIRUB SERPL-MCNC: 0.6 MG/DL (ref 0.1–1)
BILIRUB UR QL STRIP: NEGATIVE
BILIRUB UR QL STRIP: NEGATIVE
BLD GP AB SCN CELLS X3 SERPL QL: NORMAL
BLD GP AB SCN CELLS X3 SERPL QL: NORMAL
BNP SERPL-MCNC: 158 PG/ML (ref 0–99)
BSA FOR ECHO PROCEDURE: 1.67 M2
BUN SERPL-MCNC: 20 MG/DL (ref 8–23)
BUN SERPL-MCNC: 21 MG/DL (ref 8–23)
BUN SERPL-MCNC: 27 MG/DL (ref 8–23)
C DIFF GDH STL QL: NEGATIVE
C DIFF TOX A+B STL QL IA: NEGATIVE
CALCIUM SERPL-MCNC: 7.4 MG/DL (ref 8.7–10.5)
CALCIUM SERPL-MCNC: 8.3 MG/DL (ref 8.7–10.5)
CALCIUM SERPL-MCNC: 8.3 MG/DL (ref 8.7–10.5)
CALPROTECTIN STL-MCNT: 403.9 MCG/G
CHLORIDE SERPL-SCNC: 110 MMOL/L (ref 95–110)
CHLORIDE SERPL-SCNC: 114 MMOL/L (ref 95–110)
CHLORIDE SERPL-SCNC: 115 MMOL/L (ref 95–110)
CLARITY UR: ABNORMAL
CLARITY UR: CLEAR
CO2 SERPL-SCNC: 11 MMOL/L (ref 23–29)
CO2 SERPL-SCNC: 15 MMOL/L (ref 23–29)
CO2 SERPL-SCNC: 21 MMOL/L (ref 23–29)
COLOR UR: COLORLESS
COLOR UR: YELLOW
CREAT SERPL-MCNC: 0.9 MG/DL (ref 0.5–1.4)
CREAT SERPL-MCNC: 1.2 MG/DL (ref 0.5–1.4)
CREAT SERPL-MCNC: 1.4 MG/DL (ref 0.5–1.4)
CRYPTOSP AG STL QL IA: NEGATIVE
CV ECHO LV RWT: 0.47 CM
DIFFERENTIAL METHOD: ABNORMAL
DOP CALC AO PEAK VEL: 1.47 M/S
DOP CALC AO VTI: 25.9 CM
DOP CALC LVOT AREA: 3.1 CM2
DOP CALC LVOT DIAMETER: 1.98 CM
DOP CALC LVOT PEAK VEL: 1.13 M/S
DOP CALC LVOT STROKE VOLUME: 54.56 CM3
DOP CALCLVOT PEAK VEL VTI: 17.73 CM
E COLI SXT1 STL QL IA: NEGATIVE
E COLI SXT2 STL QL IA: NEGATIVE
E WAVE DECELERATION TIME: 206.18 MSEC
E/A RATIO: 0.56
ECHO LV POSTERIOR WALL: 0.75 CM (ref 0.6–1.1)
EJECTION FRACTION: 60 %
EOSINOPHIL # BLD AUTO: 0 K/UL (ref 0–0.5)
EOSINOPHIL # BLD AUTO: 0.1 K/UL (ref 0–0.5)
EOSINOPHIL # BLD AUTO: 0.1 K/UL (ref 0–0.5)
EOSINOPHIL NFR BLD: 0 % (ref 0–8)
EOSINOPHIL NFR BLD: 0.3 % (ref 0–8)
EOSINOPHIL NFR BLD: 0.6 % (ref 0–8)
EOSINOPHIL NFR BLD: 2.5 % (ref 0–8)
ERYTHROCYTE [DISTWIDTH] IN BLOOD BY AUTOMATED COUNT: 15.1 % (ref 11.5–14.5)
ERYTHROCYTE [DISTWIDTH] IN BLOOD BY AUTOMATED COUNT: 15.5 % (ref 11.5–14.5)
ERYTHROCYTE [DISTWIDTH] IN BLOOD BY AUTOMATED COUNT: 15.6 % (ref 11.5–14.5)
ERYTHROCYTE [DISTWIDTH] IN BLOOD BY AUTOMATED COUNT: 15.8 % (ref 11.5–14.5)
EST. GFR  (AFRICAN AMERICAN): 40 ML/MIN/1.73 M^2
EST. GFR  (AFRICAN AMERICAN): 48 ML/MIN/1.73 M^2
EST. GFR  (AFRICAN AMERICAN): >60 ML/MIN/1.73 M^2
EST. GFR  (NON AFRICAN AMERICAN): 35 ML/MIN/1.73 M^2
EST. GFR  (NON AFRICAN AMERICAN): 42 ML/MIN/1.73 M^2
EST. GFR  (NON AFRICAN AMERICAN): 59 ML/MIN/1.73 M^2
ESTIMATED AVG GLUCOSE: 140 MG/DL (ref 68–131)
FERRITIN SERPL-MCNC: 5 NG/ML (ref 20–300)
FRACTIONAL SHORTENING: 27 % (ref 28–44)
G LAMBLIA AG STL QL IA: NEGATIVE
GLUCOSE SERPL-MCNC: 148 MG/DL (ref 70–110)
GLUCOSE SERPL-MCNC: 237 MG/DL (ref 70–110)
GLUCOSE SERPL-MCNC: 330 MG/DL (ref 70–110)
GLUCOSE UR QL STRIP: NEGATIVE
GLUCOSE UR QL STRIP: NEGATIVE
H PYLORI IGG SERPL QL IA: POSITIVE
HBA1C MFR BLD: 6.5 % (ref 4–5.6)
HCT VFR BLD AUTO: 30.9 % (ref 37–48.5)
HCT VFR BLD AUTO: 32.5 % (ref 37–48.5)
HCT VFR BLD AUTO: 33.4 % (ref 37–48.5)
HCT VFR BLD AUTO: 33.7 % (ref 37–48.5)
HCT VFR BLD AUTO: 35.9 % (ref 37–48.5)
HCT VFR BLD AUTO: 42.2 % (ref 37–48.5)
HGB BLD-MCNC: 10 G/DL (ref 12–16)
HGB BLD-MCNC: 10 G/DL (ref 12–16)
HGB BLD-MCNC: 10.2 G/DL (ref 12–16)
HGB BLD-MCNC: 10.7 G/DL (ref 12–16)
HGB BLD-MCNC: 12.6 G/DL (ref 12–16)
HGB BLD-MCNC: 9.4 G/DL (ref 12–16)
HGB UR QL STRIP: NEGATIVE
HGB UR QL STRIP: NEGATIVE
IMM GRANULOCYTES # BLD AUTO: 0.04 K/UL (ref 0–0.04)
IMM GRANULOCYTES # BLD AUTO: 0.13 K/UL (ref 0–0.04)
IMM GRANULOCYTES # BLD AUTO: 0.16 K/UL (ref 0–0.04)
IMM GRANULOCYTES # BLD AUTO: 0.18 K/UL (ref 0–0.04)
IMM GRANULOCYTES # BLD AUTO: 0.18 K/UL (ref 0–0.04)
IMM GRANULOCYTES # BLD AUTO: 0.3 K/UL (ref 0–0.04)
IMM GRANULOCYTES NFR BLD AUTO: 0.7 % (ref 0–0.5)
IMM GRANULOCYTES NFR BLD AUTO: 0.9 % (ref 0–0.5)
IMM GRANULOCYTES NFR BLD AUTO: 0.9 % (ref 0–0.5)
IMM GRANULOCYTES NFR BLD AUTO: 1 % (ref 0–0.5)
IMM GRANULOCYTES NFR BLD AUTO: 1.2 % (ref 0–0.5)
IMM GRANULOCYTES NFR BLD AUTO: 1.4 % (ref 0–0.5)
INR PPP: 1.1 (ref 0.8–1.2)
INTERVENTRICULAR SEPTUM: 0.73 CM (ref 0.6–1.1)
IRON SERPL-MCNC: 26 UG/DL (ref 30–160)
IVRT: 128.45 MSEC
KETONES UR QL STRIP: NEGATIVE
KETONES UR QL STRIP: NEGATIVE
LA MAJOR: 4.91 CM
LA MINOR: 4.85 CM
LA WIDTH: 3.31 CM
LEFT ARM DIASTOLIC BLOOD PRESSURE: 76 MMHG
LEFT ARM SYSTOLIC BLOOD PRESSURE: 168 MMHG
LEFT ATRIUM SIZE: 3.16 CM
LEFT ATRIUM VOLUME INDEX MOD: 23.8 ML/M2
LEFT ATRIUM VOLUME INDEX: 26.3 ML/M2
LEFT ATRIUM VOLUME MOD: 39.26 CM3
LEFT ATRIUM VOLUME: 43.38 CM3
LEFT CBA DIAS: 15 CM/S
LEFT CBA SYS: 76 CM/S
LEFT CCA DIST DIAS: 10 CM/S
LEFT CCA DIST SYS: 72 CM/S
LEFT CCA MID DIAS: 16 CM/S
LEFT CCA MID SYS: 81 CM/S
LEFT CCA PROX DIAS: 14 CM/S
LEFT CCA PROX SYS: 90 CM/S
LEFT ECA DIAS: 10 CM/S
LEFT ECA SYS: 115 CM/S
LEFT ICA DIST DIAS: 26 CM/S
LEFT ICA DIST SYS: 120 CM/S
LEFT ICA MID DIAS: 32 CM/S
LEFT ICA MID SYS: 139 CM/S
LEFT ICA PROX DIAS: 38 CM/S
LEFT ICA PROX SYS: 136 CM/S
LEFT INTERNAL DIMENSION IN SYSTOLE: 2.31 CM (ref 2.1–4)
LEFT VENTRICLE DIASTOLIC VOLUME INDEX: 24.44 ML/M2
LEFT VENTRICLE DIASTOLIC VOLUME: 40.32 ML
LEFT VENTRICLE MASS INDEX: 35 G/M2
LEFT VENTRICLE SYSTOLIC VOLUME INDEX: 11.1 ML/M2
LEFT VENTRICLE SYSTOLIC VOLUME: 18.26 ML
LEFT VENTRICULAR INTERNAL DIMENSION IN DIASTOLE: 3.18 CM (ref 3.5–6)
LEFT VENTRICULAR MASS: 58.04 G
LEFT VERTEBRAL DIAS: 18 CM/S
LEFT VERTEBRAL SYS: 67 CM/S
LEUKOCYTE ESTERASE UR QL STRIP: ABNORMAL
LEUKOCYTE ESTERASE UR QL STRIP: ABNORMAL
LIPASE SERPL-CCNC: 21 U/L (ref 4–60)
LYMPHOCYTES # BLD AUTO: 0.4 K/UL (ref 1–4.8)
LYMPHOCYTES # BLD AUTO: 0.5 K/UL (ref 1–4.8)
LYMPHOCYTES # BLD AUTO: 0.6 K/UL (ref 1–4.8)
LYMPHOCYTES # BLD AUTO: 0.8 K/UL (ref 1–4.8)
LYMPHOCYTES # BLD AUTO: 1 K/UL (ref 1–4.8)
LYMPHOCYTES # BLD AUTO: 1.3 K/UL (ref 1–4.8)
LYMPHOCYTES NFR BLD: 2.7 % (ref 18–48)
LYMPHOCYTES NFR BLD: 2.9 % (ref 18–48)
LYMPHOCYTES NFR BLD: 23.6 % (ref 18–48)
LYMPHOCYTES NFR BLD: 3.2 % (ref 18–48)
LYMPHOCYTES NFR BLD: 3.7 % (ref 18–48)
LYMPHOCYTES NFR BLD: 6.8 % (ref 18–48)
MAGNESIUM SERPL-MCNC: 1.5 MG/DL (ref 1.6–2.6)
MAGNESIUM SERPL-MCNC: 2.4 MG/DL (ref 1.6–2.6)
MCH RBC QN AUTO: 24.7 PG (ref 27–31)
MCH RBC QN AUTO: 25 PG (ref 27–31)
MCH RBC QN AUTO: 25.4 PG (ref 27–31)
MCH RBC QN AUTO: 25.6 PG (ref 27–31)
MCHC RBC AUTO-ENTMCNC: 29.7 G/DL (ref 32–36)
MCHC RBC AUTO-ENTMCNC: 29.8 G/DL (ref 32–36)
MCHC RBC AUTO-ENTMCNC: 29.9 G/DL (ref 32–36)
MCHC RBC AUTO-ENTMCNC: 30.4 G/DL (ref 32–36)
MCHC RBC AUTO-ENTMCNC: 30.5 G/DL (ref 32–36)
MCHC RBC AUTO-ENTMCNC: 30.8 G/DL (ref 32–36)
MCV RBC AUTO: 83 FL (ref 82–98)
MCV RBC AUTO: 84 FL (ref 82–98)
MCV RBC AUTO: 84 FL (ref 82–98)
MCV RBC AUTO: 85 FL (ref 82–98)
MICROSCOPIC COMMENT: ABNORMAL
MICROSCOPIC COMMENT: NORMAL
MONOCYTES # BLD AUTO: 0.6 K/UL (ref 0.3–1)
MONOCYTES # BLD AUTO: 0.6 K/UL (ref 0.3–1)
MONOCYTES # BLD AUTO: 0.7 K/UL (ref 0.3–1)
MONOCYTES # BLD AUTO: 1.1 K/UL (ref 0.3–1)
MONOCYTES # BLD AUTO: 1.1 K/UL (ref 0.3–1)
MONOCYTES # BLD AUTO: 1.3 K/UL (ref 0.3–1)
MONOCYTES NFR BLD: 13 % (ref 4–15)
MONOCYTES NFR BLD: 3.1 % (ref 4–15)
MONOCYTES NFR BLD: 3.4 % (ref 4–15)
MONOCYTES NFR BLD: 5 % (ref 4–15)
MONOCYTES NFR BLD: 6.9 % (ref 4–15)
MONOCYTES NFR BLD: 9 % (ref 4–15)
MV A" WAVE DURATION": 11.7 MSEC
MV PEAK A VEL: 1.06 M/S
MV PEAK E VEL: 0.59 M/S
MV STENOSIS PRESSURE HALF TIME: 59.79 MS
MV VALVE AREA P 1/2 METHOD: 3.68 CM2
NEUTROPHILS # BLD AUTO: 12 K/UL (ref 1.8–7.7)
NEUTROPHILS # BLD AUTO: 13.6 K/UL (ref 1.8–7.7)
NEUTROPHILS # BLD AUTO: 16.7 K/UL (ref 1.8–7.7)
NEUTROPHILS # BLD AUTO: 16.8 K/UL (ref 1.8–7.7)
NEUTROPHILS # BLD AUTO: 18.8 K/UL (ref 1.8–7.7)
NEUTROPHILS # BLD AUTO: 3.3 K/UL (ref 1.8–7.7)
NEUTROPHILS NFR BLD: 60 % (ref 38–73)
NEUTROPHILS NFR BLD: 82.9 % (ref 38–73)
NEUTROPHILS NFR BLD: 88.8 % (ref 38–73)
NEUTROPHILS NFR BLD: 89.7 % (ref 38–73)
NEUTROPHILS NFR BLD: 92.1 % (ref 38–73)
NEUTROPHILS NFR BLD: 92.7 % (ref 38–73)
NITRITE UR QL STRIP: NEGATIVE
NITRITE UR QL STRIP: POSITIVE
NRBC BLD-RTO: 0 /100 WBC
OB PNL STL: POSITIVE
OHS CV CAROTID RIGHT ICA EDV HIGHEST: 75
OHS CV CAROTID ULTRASOUND LEFT ICA/CCA RATIO: 1.93
OHS CV CAROTID ULTRASOUND RIGHT ICA/CCA RATIO: 19.86
OHS CV PV CAROTID LEFT HIGHEST CCA: 90
OHS CV PV CAROTID LEFT HIGHEST ICA: 139
OHS CV PV CAROTID RIGHT HIGHEST CCA: 40
OHS CV PV CAROTID RIGHT HIGHEST ICA: 576
OHS CV US CAROTID LEFT HIGHEST EDV: 38
PH UR STRIP: 6 [PH] (ref 5–8)
PH UR STRIP: 6 [PH] (ref 5–8)
PHOSPHATE SERPL-MCNC: 2.2 MG/DL (ref 2.7–4.5)
PISA TR MAX VEL: 2.79 M/S
PLATELET # BLD AUTO: 224 K/UL (ref 150–450)
PLATELET # BLD AUTO: 242 K/UL (ref 150–450)
PLATELET # BLD AUTO: 263 K/UL (ref 150–450)
PLATELET # BLD AUTO: 263 K/UL (ref 150–450)
PLATELET # BLD AUTO: 277 K/UL (ref 150–450)
PLATELET # BLD AUTO: 294 K/UL (ref 150–450)
PMV BLD AUTO: 10.4 FL (ref 9.2–12.9)
PMV BLD AUTO: 10.5 FL (ref 9.2–12.9)
PMV BLD AUTO: 10.6 FL (ref 9.2–12.9)
PMV BLD AUTO: 10.7 FL (ref 9.2–12.9)
PMV BLD AUTO: 10.9 FL (ref 9.2–12.9)
PMV BLD AUTO: 10.9 FL (ref 9.2–12.9)
POC ACTIVATED CLOTTING TIME K: 125 SEC (ref 74–137)
POC ACTIVATED CLOTTING TIME K: 340 SEC (ref 74–137)
POCT GLUCOSE: 148 MG/DL (ref 70–110)
POCT GLUCOSE: 195 MG/DL (ref 70–110)
POCT GLUCOSE: 197 MG/DL (ref 70–110)
POCT GLUCOSE: 204 MG/DL (ref 70–110)
POCT GLUCOSE: 275 MG/DL (ref 70–110)
POCT GLUCOSE: 308 MG/DL (ref 70–110)
POTASSIUM SERPL-SCNC: 3.7 MMOL/L (ref 3.5–5.1)
POTASSIUM SERPL-SCNC: 4.3 MMOL/L (ref 3.5–5.1)
POTASSIUM SERPL-SCNC: 4.4 MMOL/L (ref 3.5–5.1)
PROT SERPL-MCNC: 6.1 G/DL (ref 6–8.4)
PROT SERPL-MCNC: 6.1 G/DL (ref 6–8.4)
PROT SERPL-MCNC: 6.6 G/DL (ref 6–8.4)
PROT UR QL STRIP: ABNORMAL
PROT UR QL STRIP: NEGATIVE
PROTHROMBIN TIME: 11.3 SEC (ref 9–12.5)
PULM VEIN S/D RATIO: 1.64
PV PEAK D VEL: 0.14 M/S
PV PEAK S VEL: 0.23 M/S
RA MAJOR: 4.73 CM
RA PRESSURE: 3 MMHG
RA WIDTH: 3.79 CM
RBC # BLD AUTO: 3.7 M/UL (ref 4–5.4)
RBC # BLD AUTO: 3.9 M/UL (ref 4–5.4)
RBC # BLD AUTO: 4.01 M/UL (ref 4–5.4)
RBC # BLD AUTO: 4.05 M/UL (ref 4–5.4)
RBC # BLD AUTO: 4.22 M/UL (ref 4–5.4)
RBC # BLD AUTO: 5.03 M/UL (ref 4–5.4)
RBC #/AREA URNS HPF: 1 /HPF (ref 0–4)
RBC #/AREA URNS HPF: 1 /HPF (ref 0–4)
RIGHT ARM DIASTOLIC BLOOD PRESSURE: 120 MMHG
RIGHT ARM SYSTOLIC BLOOD PRESSURE: 161 MMHG
RIGHT CBA DIAS: 0 CM/S
RIGHT CBA SYS: 24 CM/S
RIGHT CCA DIST DIAS: 0 CM/S
RIGHT CCA DIST SYS: 29 CM/S
RIGHT CCA MID DIAS: 0 CM/S
RIGHT CCA MID SYS: 29 CM/S
RIGHT CCA PROX DIAS: 0 CM/S
RIGHT CCA PROX SYS: 40 CM/S
RIGHT ECA DIAS: 13 CM/S
RIGHT ECA SYS: 223 CM/S
RIGHT ICA DIST DIAS: 16 CM/S
RIGHT ICA DIST SYS: 140 CM/S
RIGHT ICA MID DIAS: 15 CM/S
RIGHT ICA MID SYS: 118 CM/S
RIGHT ICA PROX DIAS: 75 CM/S
RIGHT ICA PROX SYS: 576 CM/S
RIGHT VENTRICULAR END-DIASTOLIC DIMENSION: 2.56 CM
RIGHT VERTEBRAL DIAS: 11 CM/S
RIGHT VERTEBRAL SYS: 41 CM/S
RV TISSUE DOPPLER FREE WALL SYSTOLIC VELOCITY 1 (APICAL 4 CHAMBER VIEW): 15.05 CM/S
SAMPLE: ABNORMAL
SAMPLE: NORMAL
SARS-COV-2 RDRP RESP QL NAA+PROBE: NEGATIVE
SARS-COV-2 RNA RESP QL NAA+PROBE: NOT DETECTED
SARS-COV-2 RNA RESP QL NAA+PROBE: NOT DETECTED
SARS-COV-2- CYCLE NUMBER: NORMAL
SARS-COV-2- CYCLE NUMBER: NORMAL
SATURATED IRON: 5 % (ref 20–50)
SODIUM SERPL-SCNC: 138 MMOL/L (ref 136–145)
SODIUM SERPL-SCNC: 140 MMOL/L (ref 136–145)
SODIUM SERPL-SCNC: 142 MMOL/L (ref 136–145)
SP GR UR STRIP: 1.01 (ref 1–1.03)
SP GR UR STRIP: 1.03 (ref 1–1.03)
SQUAMOUS #/AREA URNS HPF: 1 /HPF
SQUAMOUS #/AREA URNS HPF: 1 /HPF
STJ: 2.62 CM
TOTAL IRON BINDING CAPACITY: 562 UG/DL (ref 250–450)
TR MAX PG: 31 MMHG
TRANSFERRIN SERPL-MCNC: 380 MG/DL (ref 200–375)
TRICUSPID ANNULAR PLANE SYSTOLIC EXCURSION: 2.63 CM
TROPONIN I SERPL DL<=0.01 NG/ML-MCNC: 0.01 NG/ML (ref 0–0.03)
TROPONIN I SERPL DL<=0.01 NG/ML-MCNC: 0.01 NG/ML (ref 0–0.03)
TSH SERPL DL<=0.005 MIU/L-ACNC: 0.79 UIU/ML (ref 0.4–4)
TV REST PULMONARY ARTERY PRESSURE: 34 MMHG
URN SPEC COLLECT METH UR: ABNORMAL
URN SPEC COLLECT METH UR: ABNORMAL
UROBILINOGEN UR STRIP-ACNC: NEGATIVE EU/DL
UROBILINOGEN UR STRIP-ACNC: NEGATIVE EU/DL
WBC # BLD AUTO: 14.49 K/UL (ref 3.9–12.7)
WBC # BLD AUTO: 15.24 K/UL (ref 3.9–12.7)
WBC # BLD AUTO: 18.03 K/UL (ref 3.9–12.7)
WBC # BLD AUTO: 18.24 K/UL (ref 3.9–12.7)
WBC # BLD AUTO: 20.91 K/UL (ref 3.9–12.7)
WBC # BLD AUTO: 5.54 K/UL (ref 3.9–12.7)
WBC #/AREA STL HPF: NORMAL /[HPF]
WBC #/AREA URNS HPF: 4 /HPF (ref 0–5)
WBC #/AREA URNS HPF: 7 /HPF (ref 0–5)

## 2021-01-01 PROCEDURE — 87329 GIARDIA AG IA: CPT | Performed by: HOSPITALIST

## 2021-01-01 PROCEDURE — 87427 SHIGA-LIKE TOXIN AG IA: CPT | Performed by: EMERGENCY MEDICINE

## 2021-01-01 PROCEDURE — 84484 ASSAY OF TROPONIN QUANT: CPT | Performed by: NURSE PRACTITIONER

## 2021-01-01 PROCEDURE — U0005 INFEC AGEN DETEC AMPLI PROBE: HCPCS | Performed by: INTERNAL MEDICINE

## 2021-01-01 PROCEDURE — 83540 ASSAY OF IRON: CPT | Performed by: INTERNAL MEDICINE

## 2021-01-01 PROCEDURE — 83735 ASSAY OF MAGNESIUM: CPT | Performed by: NURSE PRACTITIONER

## 2021-01-01 PROCEDURE — U0002 COVID-19 LAB TEST NON-CDC: HCPCS | Performed by: HOSPITALIST

## 2021-01-01 PROCEDURE — 82962 GLUCOSE BLOOD TEST: CPT

## 2021-01-01 PROCEDURE — 63600175 PHARM REV CODE 636 W HCPCS: Performed by: NURSE PRACTITIONER

## 2021-01-01 PROCEDURE — 25000003 PHARM REV CODE 250: Performed by: NURSE PRACTITIONER

## 2021-01-01 PROCEDURE — C1876 STENT, NON-COA/NON-COV W/DEL: HCPCS | Performed by: INTERNAL MEDICINE

## 2021-01-01 PROCEDURE — 96376 TX/PRO/DX INJ SAME DRUG ADON: CPT

## 2021-01-01 PROCEDURE — 85025 COMPLETE CBC W/AUTO DIFF WBC: CPT | Mod: 91 | Performed by: NURSE PRACTITIONER

## 2021-01-01 PROCEDURE — C1760 CLOSURE DEV, VASC: HCPCS | Performed by: INTERNAL MEDICINE

## 2021-01-01 PROCEDURE — 83036 HEMOGLOBIN GLYCOSYLATED A1C: CPT | Performed by: NURSE PRACTITIONER

## 2021-01-01 PROCEDURE — 96376 TX/PRO/DX INJ SAME DRUG ADON: CPT | Mod: 59

## 2021-01-01 PROCEDURE — 99213 OFFICE O/P EST LOW 20 MIN: CPT | Mod: PBBFAC,PO | Performed by: INTERNAL MEDICINE

## 2021-01-01 PROCEDURE — 99205 OFFICE O/P NEW HI 60 MIN: CPT | Mod: S$PBB,,, | Performed by: INTERNAL MEDICINE

## 2021-01-01 PROCEDURE — 36415 COLL VENOUS BLD VENIPUNCTURE: CPT | Performed by: NURSE PRACTITIONER

## 2021-01-01 PROCEDURE — 94761 N-INVAS EAR/PLS OXIMETRY MLT: CPT

## 2021-01-01 PROCEDURE — 99285 PR EMERGENCY DEPT VISIT,LEVEL V: ICD-10-PCS | Mod: ,,, | Performed by: INTERNAL MEDICINE

## 2021-01-01 PROCEDURE — 80053 COMPREHEN METABOLIC PANEL: CPT | Performed by: NURSE PRACTITIONER

## 2021-01-01 PROCEDURE — 63600175 PHARM REV CODE 636 W HCPCS: Performed by: INTERNAL MEDICINE

## 2021-01-01 PROCEDURE — 63600175 PHARM REV CODE 636 W HCPCS: Performed by: NURSE ANESTHETIST, CERTIFIED REGISTERED

## 2021-01-01 PROCEDURE — 85730 THROMBOPLASTIN TIME PARTIAL: CPT | Performed by: EMERGENCY MEDICINE

## 2021-01-01 PROCEDURE — 82728 ASSAY OF FERRITIN: CPT | Performed by: INTERNAL MEDICINE

## 2021-01-01 PROCEDURE — 86900 BLOOD TYPING SEROLOGIC ABO: CPT | Performed by: INTERNAL MEDICINE

## 2021-01-01 PROCEDURE — 85025 COMPLETE CBC W/AUTO DIFF WBC: CPT | Performed by: NURSE PRACTITIONER

## 2021-01-01 PROCEDURE — 37215 TRANSCATH STENT CCA W/EPS: CPT | Performed by: INTERNAL MEDICINE

## 2021-01-01 PROCEDURE — 99221 PR INITIAL HOSPITAL CARE,LEVL I: ICD-10-PCS | Mod: GC,ICN,CMP, | Performed by: PSYCHIATRY & NEUROLOGY

## 2021-01-01 PROCEDURE — 83993 ASSAY FOR CALPROTECTIN FECAL: CPT | Performed by: HOSPITALIST

## 2021-01-01 PROCEDURE — 25000003 PHARM REV CODE 250: Performed by: INTERNAL MEDICINE

## 2021-01-01 PROCEDURE — 96367 TX/PROPH/DG ADDL SEQ IV INF: CPT | Mod: 59

## 2021-01-01 PROCEDURE — 99214 PR OFFICE/OUTPT VISIT, EST, LEVL IV, 30-39 MIN: ICD-10-PCS | Mod: S$PBB,,, | Performed by: INTERNAL MEDICINE

## 2021-01-01 PROCEDURE — 93005 ELECTROCARDIOGRAM TRACING: CPT

## 2021-01-01 PROCEDURE — 43235 EGD DIAGNOSTIC BRUSH WASH: CPT | Performed by: INTERNAL MEDICINE

## 2021-01-01 PROCEDURE — 93880 EXTRACRANIAL BILAT STUDY: CPT | Mod: 26,,, | Performed by: INTERNAL MEDICINE

## 2021-01-01 PROCEDURE — 87046 STOOL CULTR AEROBIC BACT EA: CPT | Mod: 59 | Performed by: EMERGENCY MEDICINE

## 2021-01-01 PROCEDURE — 37000009 HC ANESTHESIA EA ADD 15 MINS: Performed by: INTERNAL MEDICINE

## 2021-01-01 PROCEDURE — 99291 PR CRITICAL CARE, E/M 30-74 MINUTES: ICD-10-PCS | Mod: ,,, | Performed by: PSYCHIATRY & NEUROLOGY

## 2021-01-01 PROCEDURE — C9113 INJ PANTOPRAZOLE SODIUM, VIA: HCPCS | Performed by: NURSE PRACTITIONER

## 2021-01-01 PROCEDURE — 85025 COMPLETE CBC W/AUTO DIFF WBC: CPT | Performed by: INTERNAL MEDICINE

## 2021-01-01 PROCEDURE — 25000003 PHARM REV CODE 250: Performed by: NURSE ANESTHETIST, CERTIFIED REGISTERED

## 2021-01-01 PROCEDURE — 25000003 PHARM REV CODE 250: Performed by: EMERGENCY MEDICINE

## 2021-01-01 PROCEDURE — 37215 TRANSCATH STENT CCA W/EPS: CPT | Mod: RT,,, | Performed by: INTERNAL MEDICINE

## 2021-01-01 PROCEDURE — 87324 CLOSTRIDIUM AG IA: CPT | Performed by: EMERGENCY MEDICINE

## 2021-01-01 PROCEDURE — 99232 SBSQ HOSP IP/OBS MODERATE 35: CPT | Mod: ,,, | Performed by: INTERNAL MEDICINE

## 2021-01-01 PROCEDURE — 80053 COMPREHEN METABOLIC PANEL: CPT | Performed by: EMERGENCY MEDICINE

## 2021-01-01 PROCEDURE — 99291 CRITICAL CARE FIRST HOUR: CPT | Mod: ,,, | Performed by: PSYCHIATRY & NEUROLOGY

## 2021-01-01 PROCEDURE — 82272 OCCULT BLD FECES 1-3 TESTS: CPT | Performed by: EMERGENCY MEDICINE

## 2021-01-01 PROCEDURE — 11000001 HC ACUTE MED/SURG PRIVATE ROOM

## 2021-01-01 PROCEDURE — 99214 OFFICE O/P EST MOD 30 MIN: CPT | Mod: S$PBB,,, | Performed by: INTERNAL MEDICINE

## 2021-01-01 PROCEDURE — 81000 URINALYSIS NONAUTO W/SCOPE: CPT | Performed by: NURSE PRACTITIONER

## 2021-01-01 PROCEDURE — 99999 PR PBB SHADOW E&M-EST. PATIENT-LVL IV: CPT | Mod: PBBFAC,,, | Performed by: INTERNAL MEDICINE

## 2021-01-01 PROCEDURE — 87045 FECES CULTURE AEROBIC BACT: CPT | Performed by: EMERGENCY MEDICINE

## 2021-01-01 PROCEDURE — 99900035 HC TECH TIME PER 15 MIN (STAT)

## 2021-01-01 PROCEDURE — S0030 INJECTION, METRONIDAZOLE: HCPCS | Performed by: NURSE PRACTITIONER

## 2021-01-01 PROCEDURE — 43235 EGD DIAGNOSTIC BRUSH WASH: CPT | Mod: ,,, | Performed by: INTERNAL MEDICINE

## 2021-01-01 PROCEDURE — 99221 1ST HOSP IP/OBS SF/LOW 40: CPT | Mod: GC,ICN,CMP, | Performed by: PSYCHIATRY & NEUROLOGY

## 2021-01-01 PROCEDURE — 93010 EKG 12-LEAD: ICD-10-PCS | Mod: ,,, | Performed by: INTERNAL MEDICINE

## 2021-01-01 PROCEDURE — 85347 COAGULATION TIME ACTIVATED: CPT

## 2021-01-01 PROCEDURE — 63600175 PHARM REV CODE 636 W HCPCS: Performed by: EMERGENCY MEDICINE

## 2021-01-01 PROCEDURE — 84484 ASSAY OF TROPONIN QUANT: CPT | Mod: 91 | Performed by: NURSE PRACTITIONER

## 2021-01-01 PROCEDURE — 85347 COAGULATION TIME ACTIVATED: CPT | Performed by: INTERNAL MEDICINE

## 2021-01-01 PROCEDURE — 37000008 HC ANESTHESIA 1ST 15 MINUTES: Performed by: INTERNAL MEDICINE

## 2021-01-01 PROCEDURE — 99285 EMERGENCY DEPT VISIT HI MDM: CPT | Mod: ,,, | Performed by: INTERNAL MEDICINE

## 2021-01-01 PROCEDURE — C9113 INJ PANTOPRAZOLE SODIUM, VIA: HCPCS | Performed by: EMERGENCY MEDICINE

## 2021-01-01 PROCEDURE — 43235 PR EGD, FLEX, DIAGNOSTIC: ICD-10-PCS | Mod: ,,, | Performed by: INTERNAL MEDICINE

## 2021-01-01 PROCEDURE — 99232 PR SUBSEQUENT HOSPITAL CARE,LEVL II: ICD-10-PCS | Mod: ,,, | Performed by: INTERNAL MEDICINE

## 2021-01-01 PROCEDURE — C1894 INTRO/SHEATH, NON-LASER: HCPCS | Performed by: INTERNAL MEDICINE

## 2021-01-01 PROCEDURE — 51702 INSERT TEMP BLADDER CATH: CPT

## 2021-01-01 PROCEDURE — 96375 TX/PRO/DX INJ NEW DRUG ADDON: CPT

## 2021-01-01 PROCEDURE — 85025 COMPLETE CBC W/AUTO DIFF WBC: CPT | Performed by: EMERGENCY MEDICINE

## 2021-01-01 PROCEDURE — 96375 TX/PRO/DX INJ NEW DRUG ADDON: CPT | Mod: 59

## 2021-01-01 PROCEDURE — 93880 CV US DOPPLER CAROTID (CUPID ONLY): ICD-10-PCS | Mod: 26,,, | Performed by: INTERNAL MEDICINE

## 2021-01-01 PROCEDURE — 99999 PR PBB SHADOW E&M-EST. PATIENT-LVL IV: ICD-10-PCS | Mod: PBBFAC,,, | Performed by: INTERNAL MEDICINE

## 2021-01-01 PROCEDURE — C1725 CATH, TRANSLUMIN NON-LASER: HCPCS | Performed by: INTERNAL MEDICINE

## 2021-01-01 PROCEDURE — C1887 CATHETER, GUIDING: HCPCS | Performed by: INTERNAL MEDICINE

## 2021-01-01 PROCEDURE — 37799 UNLISTED PX VASCULAR SURGERY: CPT

## 2021-01-01 PROCEDURE — 86900 BLOOD TYPING SEROLOGIC ABO: CPT | Performed by: EMERGENCY MEDICINE

## 2021-01-01 PROCEDURE — 85610 PROTHROMBIN TIME: CPT | Performed by: EMERGENCY MEDICINE

## 2021-01-01 PROCEDURE — 81000 URINALYSIS NONAUTO W/SCOPE: CPT | Performed by: EMERGENCY MEDICINE

## 2021-01-01 PROCEDURE — 87449 NOS EACH ORGANISM AG IA: CPT | Performed by: EMERGENCY MEDICINE

## 2021-01-01 PROCEDURE — 99999 PR PBB SHADOW E&M-EST. PATIENT-LVL III: CPT | Mod: PBBFAC,,, | Performed by: INTERNAL MEDICINE

## 2021-01-01 PROCEDURE — 36415 COLL VENOUS BLD VENIPUNCTURE: CPT | Performed by: INTERNAL MEDICINE

## 2021-01-01 PROCEDURE — C1769 GUIDE WIRE: HCPCS | Performed by: INTERNAL MEDICINE

## 2021-01-01 PROCEDURE — 84100 ASSAY OF PHOSPHORUS: CPT | Performed by: NURSE PRACTITIONER

## 2021-01-01 PROCEDURE — 27201423 OPTIME MED/SURG SUP & DEVICES STERILE SUPPLY: Performed by: INTERNAL MEDICINE

## 2021-01-01 PROCEDURE — 83880 ASSAY OF NATRIURETIC PEPTIDE: CPT | Performed by: NURSE PRACTITIONER

## 2021-01-01 PROCEDURE — 84443 ASSAY THYROID STIM HORMONE: CPT | Performed by: NURSE PRACTITIONER

## 2021-01-01 PROCEDURE — 96365 THER/PROPH/DIAG IV INF INIT: CPT | Mod: 59

## 2021-01-01 PROCEDURE — 99214 OFFICE O/P EST MOD 30 MIN: CPT | Mod: PBBFAC,25 | Performed by: INTERNAL MEDICINE

## 2021-01-01 PROCEDURE — 37215 PR CAROTID STENT PLACEMENT, CERVICAL: ICD-10-PCS | Mod: RT,,, | Performed by: INTERNAL MEDICINE

## 2021-01-01 PROCEDURE — 93010 ELECTROCARDIOGRAM REPORT: CPT | Mod: ,,, | Performed by: INTERNAL MEDICINE

## 2021-01-01 PROCEDURE — 99285 EMERGENCY DEPT VISIT HI MDM: CPT | Mod: 25

## 2021-01-01 PROCEDURE — 89055 LEUKOCYTE ASSESSMENT FECAL: CPT | Performed by: HOSPITALIST

## 2021-01-01 PROCEDURE — 93880 EXTRACRANIAL BILAT STUDY: CPT

## 2021-01-01 PROCEDURE — 36216 PLACE CATHETER IN ARTERY: CPT | Performed by: INTERNAL MEDICINE

## 2021-01-01 PROCEDURE — 25500020 PHARM REV CODE 255: Performed by: EMERGENCY MEDICINE

## 2021-01-01 PROCEDURE — 99205 PR OFFICE/OUTPT VISIT, NEW, LEVL V, 60-74 MIN: ICD-10-PCS | Mod: S$PBB,,, | Performed by: INTERNAL MEDICINE

## 2021-01-01 PROCEDURE — 83690 ASSAY OF LIPASE: CPT | Performed by: EMERGENCY MEDICINE

## 2021-01-01 PROCEDURE — 99999 PR PBB SHADOW E&M-EST. PATIENT-LVL III: ICD-10-PCS | Mod: PBBFAC,,, | Performed by: INTERNAL MEDICINE

## 2021-01-01 PROCEDURE — U0003 INFECTIOUS AGENT DETECTION BY NUCLEIC ACID (DNA OR RNA); SEVERE ACUTE RESPIRATORY SYNDROME CORONAVIRUS 2 (SARS-COV-2) (CORONAVIRUS DISEASE [COVID-19]), AMPLIFIED PROBE TECHNIQUE, MAKING USE OF HIGH THROUGHPUT TECHNOLOGIES AS DESCRIBED BY CMS-2020-01-R: HCPCS | Performed by: INTERNAL MEDICINE

## 2021-01-01 PROCEDURE — 86677 HELICOBACTER PYLORI ANTIBODY: CPT | Performed by: INTERNAL MEDICINE

## 2021-01-01 PROCEDURE — 20000000 HC ICU ROOM

## 2021-01-01 DEVICE — IMPLANTABLE DEVICE
Type: IMPLANTABLE DEVICE | Site: CAROTID | Status: FUNCTIONAL
Brand: CORDIS PRECISE PRO RX NITINOL STENT SYSTEM

## 2021-01-01 RX ORDER — CLARITHROMYCIN 500 MG/1
500 TABLET, FILM COATED ORAL EVERY 12 HOURS
Qty: 28 TABLET | Refills: 0 | Status: SHIPPED | OUTPATIENT
Start: 2021-01-01 | End: 2021-01-01

## 2021-01-01 RX ORDER — HYDRALAZINE HYDROCHLORIDE 20 MG/ML
INJECTION INTRAMUSCULAR; INTRAVENOUS
Status: DISCONTINUED | OUTPATIENT
Start: 2021-01-01 | End: 2021-01-01 | Stop reason: HOSPADM

## 2021-01-01 RX ORDER — ACETAMINOPHEN 325 MG/1
650 TABLET ORAL EVERY 6 HOURS PRN
Status: DISCONTINUED | OUTPATIENT
Start: 2021-01-01 | End: 2021-01-01

## 2021-01-01 RX ORDER — DIPHENHYDRAMINE HCL 50 MG
CAPSULE ORAL
Qty: 3 CAPSULE | Refills: 0 | Status: ON HOLD | OUTPATIENT
Start: 2021-01-01 | End: 2021-01-01 | Stop reason: HOSPADM

## 2021-01-01 RX ORDER — CIPROFLOXACIN 2 MG/ML
400 INJECTION, SOLUTION INTRAVENOUS
Status: COMPLETED | OUTPATIENT
Start: 2021-01-01 | End: 2021-01-01

## 2021-01-01 RX ORDER — FUROSEMIDE 20 MG/1
TABLET ORAL
COMMUNITY
Start: 2021-01-01

## 2021-01-01 RX ORDER — ROCURONIUM BROMIDE 10 MG/ML
INJECTION, SOLUTION INTRAVENOUS
Status: DISPENSED
Start: 2021-01-01 | End: 2021-01-01

## 2021-01-01 RX ORDER — DIPHENHYDRAMINE HYDROCHLORIDE 50 MG/ML
25 INJECTION INTRAMUSCULAR; INTRAVENOUS
Status: COMPLETED | OUTPATIENT
Start: 2021-01-01 | End: 2021-01-01

## 2021-01-01 RX ORDER — ACETAMINOPHEN 325 MG/1
650 TABLET ORAL EVERY 4 HOURS PRN
Status: DISCONTINUED | OUTPATIENT
Start: 2021-01-01 | End: 2021-01-01

## 2021-01-01 RX ORDER — LEVOTHYROXINE SODIUM 75 UG/1
75 TABLET ORAL
Status: DISCONTINUED | OUTPATIENT
Start: 2021-01-01 | End: 2021-01-01

## 2021-01-01 RX ORDER — CLOPIDOGREL BISULFATE 75 MG/1
75 TABLET ORAL DAILY
Status: DISCONTINUED | OUTPATIENT
Start: 2021-01-01 | End: 2021-01-01

## 2021-01-01 RX ORDER — MAG HYDROX/ALUMINUM HYD/SIMETH 200-200-20
15 SUSPENSION, ORAL (FINAL DOSE FORM) ORAL
Status: COMPLETED | OUTPATIENT
Start: 2021-01-01 | End: 2021-01-01

## 2021-01-01 RX ORDER — HEPARIN SODIUM 1000 [USP'U]/ML
INJECTION, SOLUTION INTRAVENOUS; SUBCUTANEOUS
Status: DISCONTINUED | OUTPATIENT
Start: 2021-01-01 | End: 2021-01-01 | Stop reason: HOSPADM

## 2021-01-01 RX ORDER — MORPHINE SULFATE 4 MG/ML
4 INJECTION, SOLUTION INTRAMUSCULAR; INTRAVENOUS
Status: DISCONTINUED | OUTPATIENT
Start: 2021-01-01 | End: 2021-01-01 | Stop reason: HOSPADM

## 2021-01-01 RX ORDER — ETOMIDATE 2 MG/ML
INJECTION INTRAVENOUS
Status: DISCONTINUED
Start: 2021-01-01 | End: 2021-01-01 | Stop reason: WASHOUT

## 2021-01-01 RX ORDER — IBUPROFEN 200 MG
24 TABLET ORAL
Status: DISCONTINUED | OUTPATIENT
Start: 2021-01-01 | End: 2021-01-01 | Stop reason: HOSPADM

## 2021-01-01 RX ORDER — IRBESARTAN 300 MG/1
300 TABLET ORAL
COMMUNITY
Start: 2020-09-22 | End: 2021-01-01

## 2021-01-01 RX ORDER — INSULIN ASPART 100 [IU]/ML
1-10 INJECTION, SOLUTION INTRAVENOUS; SUBCUTANEOUS
Status: DISCONTINUED | OUTPATIENT
Start: 2021-01-01 | End: 2021-01-01 | Stop reason: HOSPADM

## 2021-01-01 RX ORDER — GLUCAGON 1 MG
1 KIT INJECTION
Status: DISCONTINUED | OUTPATIENT
Start: 2021-01-01 | End: 2021-01-01 | Stop reason: HOSPADM

## 2021-01-01 RX ORDER — PANTOPRAZOLE SODIUM 40 MG/10ML
40 INJECTION, POWDER, LYOPHILIZED, FOR SOLUTION INTRAVENOUS 2 TIMES DAILY
Status: DISCONTINUED | OUTPATIENT
Start: 2021-01-01 | End: 2021-01-01 | Stop reason: HOSPADM

## 2021-01-01 RX ORDER — TALC
6 POWDER (GRAM) TOPICAL NIGHTLY PRN
Status: DISCONTINUED | OUTPATIENT
Start: 2021-01-01 | End: 2021-01-01 | Stop reason: HOSPADM

## 2021-01-01 RX ORDER — IBUPROFEN 200 MG
16 TABLET ORAL
Status: DISCONTINUED | OUTPATIENT
Start: 2021-01-01 | End: 2021-01-01 | Stop reason: HOSPADM

## 2021-01-01 RX ORDER — LIDOCAINE HCL/PF 100 MG/5ML
SYRINGE (ML) INTRAVENOUS
Status: DISCONTINUED | OUTPATIENT
Start: 2021-01-01 | End: 2021-01-01

## 2021-01-01 RX ORDER — HALOPERIDOL 5 MG/ML
1 INJECTION INTRAMUSCULAR EVERY 4 HOURS PRN
Status: DISCONTINUED | OUTPATIENT
Start: 2021-01-01 | End: 2021-01-01 | Stop reason: HOSPADM

## 2021-01-01 RX ORDER — FAMOTIDINE 10 MG/ML
INJECTION INTRAVENOUS
Status: DISCONTINUED | OUTPATIENT
Start: 2021-01-01 | End: 2021-01-01 | Stop reason: HOSPADM

## 2021-01-01 RX ORDER — LIDOCAINE HYDROCHLORIDE 20 MG/ML
INJECTION, SOLUTION EPIDURAL; INFILTRATION; INTRACAUDAL; PERINEURAL
Status: DISCONTINUED | OUTPATIENT
Start: 2021-01-01 | End: 2021-01-01 | Stop reason: HOSPADM

## 2021-01-01 RX ORDER — PROPOFOL 10 MG/ML
INJECTION, EMULSION INTRAVENOUS
Status: DISCONTINUED | OUTPATIENT
Start: 2021-01-01 | End: 2021-01-01

## 2021-01-01 RX ORDER — PREDNISONE 50 MG/1
TABLET ORAL
Qty: 3 TABLET | Refills: 0 | Status: ON HOLD | OUTPATIENT
Start: 2021-01-01 | End: 2021-01-01 | Stop reason: HOSPADM

## 2021-01-01 RX ORDER — ONDANSETRON 8 MG/1
8 TABLET, ORALLY DISINTEGRATING ORAL EVERY 8 HOURS PRN
Status: DISCONTINUED | OUTPATIENT
Start: 2021-01-01 | End: 2021-01-01 | Stop reason: HOSPADM

## 2021-01-01 RX ORDER — SODIUM CHLORIDE 9 MG/ML
INJECTION, SOLUTION INTRAVENOUS CONTINUOUS
Status: CANCELLED | OUTPATIENT
Start: 2021-01-01 | End: 2021-01-01

## 2021-01-01 RX ORDER — NICARDIPINE HYDROCHLORIDE 0.2 MG/ML
INJECTION INTRAVENOUS
Status: DISCONTINUED | OUTPATIENT
Start: 2021-01-01 | End: 2021-01-01 | Stop reason: HOSPADM

## 2021-01-01 RX ORDER — AMOXICILLIN 250 MG
1 CAPSULE ORAL DAILY PRN
Status: DISCONTINUED | OUTPATIENT
Start: 2021-01-01 | End: 2021-01-01 | Stop reason: HOSPADM

## 2021-01-01 RX ORDER — ESCITALOPRAM OXALATE 5 MG/1
20 TABLET ORAL DAILY
Status: DISCONTINUED | OUTPATIENT
Start: 2021-01-01 | End: 2021-01-01

## 2021-01-01 RX ORDER — ATORVASTATIN CALCIUM 20 MG/1
80 TABLET, FILM COATED ORAL NIGHTLY
Status: DISCONTINUED | OUTPATIENT
Start: 2021-01-01 | End: 2021-01-01

## 2021-01-01 RX ORDER — ACETAMINOPHEN 325 MG/1
650 TABLET ORAL EVERY 8 HOURS PRN
Status: DISCONTINUED | OUTPATIENT
Start: 2021-01-01 | End: 2021-01-01 | Stop reason: HOSPADM

## 2021-01-01 RX ORDER — ESCITALOPRAM OXALATE 10 MG/1
20 TABLET ORAL DAILY
COMMUNITY
Start: 2021-01-01

## 2021-01-01 RX ORDER — PANTOPRAZOLE SODIUM 40 MG/1
40 TABLET, DELAYED RELEASE ORAL 2 TIMES DAILY
Qty: 60 TABLET | Refills: 11 | Status: SHIPPED | OUTPATIENT
Start: 2021-01-01 | End: 2022-05-27

## 2021-01-01 RX ORDER — AMLODIPINE BESYLATE 2.5 MG/1
2.5 TABLET ORAL DAILY
Status: DISCONTINUED | OUTPATIENT
Start: 2021-01-01 | End: 2021-01-01 | Stop reason: HOSPADM

## 2021-01-01 RX ORDER — LOSARTAN POTASSIUM 50 MG/1
50 TABLET ORAL NIGHTLY
Status: DISCONTINUED | OUTPATIENT
Start: 2021-01-01 | End: 2021-01-01

## 2021-01-01 RX ORDER — CIPROFLOXACIN 2 MG/ML
400 INJECTION, SOLUTION INTRAVENOUS
Status: DISCONTINUED | OUTPATIENT
Start: 2021-01-01 | End: 2021-01-01 | Stop reason: HOSPADM

## 2021-01-01 RX ORDER — SODIUM CHLORIDE 0.9 % (FLUSH) 0.9 %
10 SYRINGE (ML) INJECTION EVERY 8 HOURS PRN
Status: DISCONTINUED | OUTPATIENT
Start: 2021-01-01 | End: 2021-01-01 | Stop reason: HOSPADM

## 2021-01-01 RX ORDER — CEFAZOLIN SODIUM 1 G/3ML
INJECTION, POWDER, FOR SOLUTION INTRAMUSCULAR; INTRAVENOUS
Status: DISCONTINUED | OUTPATIENT
Start: 2021-01-01 | End: 2021-01-01 | Stop reason: HOSPADM

## 2021-01-01 RX ORDER — SODIUM CHLORIDE 9 MG/ML
INJECTION, SOLUTION INTRAVENOUS CONTINUOUS
Status: DISCONTINUED | OUTPATIENT
Start: 2021-01-01 | End: 2021-01-01

## 2021-01-01 RX ORDER — GLUCAGON 1 MG
1 KIT INJECTION
Status: DISCONTINUED | OUTPATIENT
Start: 2021-01-01 | End: 2021-01-01

## 2021-01-01 RX ORDER — ONDANSETRON 8 MG/1
8 TABLET, ORALLY DISINTEGRATING ORAL EVERY 8 HOURS PRN
Status: DISCONTINUED | OUTPATIENT
Start: 2021-01-01 | End: 2021-01-01

## 2021-01-01 RX ORDER — CLOPIDOGREL BISULFATE 75 MG/1
75 TABLET ORAL DAILY
Qty: 30 TABLET | Refills: 11 | Status: SHIPPED | OUTPATIENT
Start: 2021-01-01 | End: 2022-08-24

## 2021-01-01 RX ORDER — ONDANSETRON 2 MG/ML
4 INJECTION INTRAMUSCULAR; INTRAVENOUS EVERY 8 HOURS PRN
Status: DISCONTINUED | OUTPATIENT
Start: 2021-01-01 | End: 2021-01-01 | Stop reason: HOSPADM

## 2021-01-01 RX ORDER — DIPHENHYDRAMINE HYDROCHLORIDE 50 MG/ML
INJECTION INTRAMUSCULAR; INTRAVENOUS
Status: DISCONTINUED | OUTPATIENT
Start: 2021-01-01 | End: 2021-01-01 | Stop reason: HOSPADM

## 2021-01-01 RX ORDER — PANTOPRAZOLE SODIUM 40 MG/10ML
80 INJECTION, POWDER, LYOPHILIZED, FOR SOLUTION INTRAVENOUS
Status: COMPLETED | OUTPATIENT
Start: 2021-01-01 | End: 2021-01-01

## 2021-01-01 RX ORDER — HYDRALAZINE HYDROCHLORIDE 20 MG/ML
10 INJECTION INTRAMUSCULAR; INTRAVENOUS EVERY 6 HOURS PRN
Status: DISCONTINUED | OUTPATIENT
Start: 2021-01-01 | End: 2021-01-01 | Stop reason: HOSPADM

## 2021-01-01 RX ORDER — PROCHLORPERAZINE EDISYLATE 5 MG/ML
10 INJECTION INTRAMUSCULAR; INTRAVENOUS EVERY 6 HOURS PRN
Status: DISCONTINUED | OUTPATIENT
Start: 2021-01-01 | End: 2021-01-01 | Stop reason: HOSPADM

## 2021-01-01 RX ORDER — PANTOPRAZOLE SODIUM 40 MG/1
40 TABLET, DELAYED RELEASE ORAL DAILY
Status: DISCONTINUED | OUTPATIENT
Start: 2021-01-01 | End: 2021-01-01

## 2021-01-01 RX ORDER — PROPOFOL 10 MG/ML
INJECTION, EMULSION INTRAVENOUS CONTINUOUS PRN
Status: DISCONTINUED | OUTPATIENT
Start: 2021-01-01 | End: 2021-01-01

## 2021-01-01 RX ORDER — DIPHENHYDRAMINE HCL 50 MG
50 CAPSULE ORAL ONCE
Status: DISCONTINUED | OUTPATIENT
Start: 2021-01-01 | End: 2021-01-01

## 2021-01-01 RX ORDER — LORAZEPAM 2 MG/ML
2 INJECTION INTRAMUSCULAR
Status: DISCONTINUED | OUTPATIENT
Start: 2021-01-01 | End: 2021-01-01 | Stop reason: HOSPADM

## 2021-01-01 RX ORDER — AMOXICILLIN 500 MG/1
1000 TABLET, FILM COATED ORAL EVERY 12 HOURS
Qty: 56 TABLET | Refills: 0 | Status: SHIPPED | OUTPATIENT
Start: 2021-01-01 | End: 2021-01-01

## 2021-01-01 RX ORDER — METHYLPREDNISOLONE SOD SUCC 125 MG
80 VIAL (EA) INJECTION
Status: COMPLETED | OUTPATIENT
Start: 2021-01-01 | End: 2021-01-01

## 2021-01-01 RX ORDER — SCOLOPAMINE TRANSDERMAL SYSTEM 1 MG/1
1 PATCH, EXTENDED RELEASE TRANSDERMAL
Status: DISCONTINUED | OUTPATIENT
Start: 2021-01-01 | End: 2021-01-01 | Stop reason: HOSPADM

## 2021-01-01 RX ORDER — PROPOFOL 10 MG/ML
VIAL (ML) INTRAVENOUS
Status: DISCONTINUED
Start: 2021-01-01 | End: 2021-01-01 | Stop reason: WASHOUT

## 2021-01-01 RX ORDER — SUCCINYLCHOLINE CHLORIDE 20 MG/ML
INJECTION INTRAMUSCULAR; INTRAVENOUS
Status: DISCONTINUED
Start: 2021-01-01 | End: 2021-01-01 | Stop reason: WASHOUT

## 2021-01-01 RX ORDER — ONDANSETRON 2 MG/ML
4 INJECTION INTRAMUSCULAR; INTRAVENOUS
Status: COMPLETED | OUTPATIENT
Start: 2021-01-01 | End: 2021-01-01

## 2021-01-01 RX ORDER — SODIUM CHLORIDE, SODIUM LACTATE, POTASSIUM CHLORIDE, CALCIUM CHLORIDE 600; 310; 30; 20 MG/100ML; MG/100ML; MG/100ML; MG/100ML
INJECTION, SOLUTION INTRAVENOUS CONTINUOUS
Status: DISCONTINUED | OUTPATIENT
Start: 2021-01-01 | End: 2021-01-01 | Stop reason: HOSPADM

## 2021-01-01 RX ORDER — INSULIN ASPART 100 [IU]/ML
0-5 INJECTION, SOLUTION INTRAVENOUS; SUBCUTANEOUS EVERY 6 HOURS PRN
Status: DISCONTINUED | OUTPATIENT
Start: 2021-01-01 | End: 2021-01-01

## 2021-01-01 RX ORDER — MORPHINE SULFATE 2 MG/ML
2 INJECTION, SOLUTION INTRAMUSCULAR; INTRAVENOUS
Status: COMPLETED | OUTPATIENT
Start: 2021-01-01 | End: 2021-01-01

## 2021-01-01 RX ORDER — CALCIUM CARBONATE 200(500)MG
500 TABLET,CHEWABLE ORAL DAILY PRN
Status: DISCONTINUED | OUTPATIENT
Start: 2021-01-01 | End: 2021-01-01

## 2021-01-01 RX ORDER — MAGNESIUM SULFATE HEPTAHYDRATE 40 MG/ML
2 INJECTION, SOLUTION INTRAVENOUS ONCE
Status: COMPLETED | OUTPATIENT
Start: 2021-01-01 | End: 2021-01-01

## 2021-01-01 RX ORDER — MORPHINE SULFATE 4 MG/ML
4 INJECTION, SOLUTION INTRAMUSCULAR; INTRAVENOUS
Status: COMPLETED | OUTPATIENT
Start: 2021-01-01 | End: 2021-01-01

## 2021-01-01 RX ORDER — LEVOTHYROXINE SODIUM 75 UG/1
75 TABLET ORAL
Status: DISCONTINUED | OUTPATIENT
Start: 2021-01-01 | End: 2021-01-01 | Stop reason: HOSPADM

## 2021-01-01 RX ORDER — METRONIDAZOLE 500 MG/100ML
500 INJECTION, SOLUTION INTRAVENOUS
Status: DISCONTINUED | OUTPATIENT
Start: 2021-01-01 | End: 2021-01-01 | Stop reason: HOSPADM

## 2021-01-01 RX ORDER — TALC
6 POWDER (GRAM) TOPICAL NIGHTLY PRN
Status: DISCONTINUED | OUTPATIENT
Start: 2021-01-01 | End: 2021-01-01

## 2021-01-01 RX ADMIN — INSULIN ASPART 4 UNITS: 100 INJECTION, SOLUTION INTRAVENOUS; SUBCUTANEOUS at 11:05

## 2021-01-01 RX ADMIN — HYDRALAZINE HYDROCHLORIDE 10 MG: 20 INJECTION, SOLUTION INTRAMUSCULAR; INTRAVENOUS at 10:10

## 2021-01-01 RX ADMIN — METHYLPREDNISOLONE SODIUM SUCCINATE 80 MG: 125 INJECTION, POWDER, FOR SOLUTION INTRAMUSCULAR; INTRAVENOUS at 11:05

## 2021-01-01 RX ADMIN — ALUMINUM HYDROXIDE, MAGNESIUM HYDROXIDE, AND SIMETHICONE 15 ML: 200; 200; 20 SUSPENSION ORAL at 10:05

## 2021-01-01 RX ADMIN — LEVOTHYROXINE SODIUM 75 MCG: 25 TABLET ORAL at 07:05

## 2021-01-01 RX ADMIN — MORPHINE SULFATE 4 MG: 4 INJECTION INTRAVENOUS at 05:10

## 2021-01-01 RX ADMIN — CIPROFLOXACIN 400 MG: 2 INJECTION, SOLUTION INTRAVENOUS at 11:05

## 2021-01-01 RX ADMIN — LORAZEPAM 2 MG: 2 INJECTION INTRAMUSCULAR; INTRAVENOUS at 06:10

## 2021-01-01 RX ADMIN — LORAZEPAM 2 MG: 2 INJECTION INTRAMUSCULAR; INTRAVENOUS at 05:10

## 2021-01-01 RX ADMIN — PROPOFOL 125 MCG/KG/MIN: 10 INJECTION, EMULSION INTRAVENOUS at 03:05

## 2021-01-01 RX ADMIN — MAGNESIUM SULFATE IN WATER 2 G: 40 INJECTION, SOLUTION INTRAVENOUS at 07:05

## 2021-01-01 RX ADMIN — PANTOPRAZOLE SODIUM 40 MG: 40 INJECTION, POWDER, LYOPHILIZED, FOR SOLUTION INTRAVENOUS at 10:05

## 2021-01-01 RX ADMIN — ONDANSETRON 4 MG: 2 INJECTION INTRAMUSCULAR; INTRAVENOUS at 10:05

## 2021-01-01 RX ADMIN — AMLODIPINE BESYLATE 2.5 MG: 2.5 TABLET ORAL at 09:05

## 2021-01-01 RX ADMIN — CIPROFLOXACIN 400 MG: 2 INJECTION, SOLUTION INTRAVENOUS at 12:05

## 2021-01-01 RX ADMIN — LIDOCAINE HYDROCHLORIDE 50 MG: 20 INJECTION, SOLUTION INTRAVENOUS at 03:05

## 2021-01-01 RX ADMIN — MORPHINE SULFATE 4 MG: 4 INJECTION INTRAVENOUS at 09:10

## 2021-01-01 RX ADMIN — LORAZEPAM 2 MG: 2 INJECTION INTRAMUSCULAR; INTRAVENOUS at 03:10

## 2021-01-01 RX ADMIN — IOHEXOL 75 ML: 350 INJECTION, SOLUTION INTRAVENOUS at 12:05

## 2021-01-01 RX ADMIN — PANTOPRAZOLE SODIUM 80 MG: 40 INJECTION, POWDER, FOR SOLUTION INTRAVENOUS at 11:05

## 2021-01-01 RX ADMIN — MORPHINE SULFATE 4 MG: 4 INJECTION INTRAVENOUS at 01:10

## 2021-01-01 RX ADMIN — METRONIDAZOLE 500 MG: 500 INJECTION, SOLUTION INTRAVENOUS at 09:05

## 2021-01-01 RX ADMIN — PANTOPRAZOLE SODIUM 40 MG: 40 INJECTION, POWDER, LYOPHILIZED, FOR SOLUTION INTRAVENOUS at 09:05

## 2021-01-01 RX ADMIN — MORPHINE SULFATE 4 MG: 4 INJECTION INTRAVENOUS at 03:10

## 2021-01-01 RX ADMIN — MORPHINE SULFATE 2 MG: 2 INJECTION, SOLUTION INTRAMUSCULAR; INTRAVENOUS at 10:05

## 2021-01-01 RX ADMIN — SODIUM CHLORIDE 1000 ML: 0.9 INJECTION, SOLUTION INTRAVENOUS at 10:05

## 2021-01-01 RX ADMIN — METRONIDAZOLE 500 MG: 500 INJECTION, SOLUTION INTRAVENOUS at 03:05

## 2021-01-01 RX ADMIN — DIPHENHYDRAMINE HYDROCHLORIDE 25 MG: 50 INJECTION, SOLUTION INTRAMUSCULAR; INTRAVENOUS at 11:05

## 2021-01-01 RX ADMIN — MORPHINE SULFATE 4 MG: 4 INJECTION INTRAVENOUS at 06:10

## 2021-01-01 RX ADMIN — CIPROFLOXACIN 400 MG: 2 INJECTION, SOLUTION INTRAVENOUS at 01:05

## 2021-01-01 RX ADMIN — SCOPALAMINE 1 PATCH: 1 PATCH, EXTENDED RELEASE TRANSDERMAL at 09:10

## 2021-01-01 RX ADMIN — PROPOFOL 50 MG: 10 INJECTION, EMULSION INTRAVENOUS at 03:05

## 2021-01-01 RX ADMIN — SODIUM CHLORIDE: 0.9 INJECTION, SOLUTION INTRAVENOUS at 07:10

## 2021-01-01 RX ADMIN — SODIUM CHLORIDE, SODIUM LACTATE, POTASSIUM CHLORIDE, AND CALCIUM CHLORIDE: .6; .31; .03; .02 INJECTION, SOLUTION INTRAVENOUS at 04:05

## 2021-01-01 RX ADMIN — LEVOTHYROXINE SODIUM 75 MCG: 25 TABLET ORAL at 06:05

## 2021-01-01 RX ADMIN — METRONIDAZOLE 500 MG: 500 INJECTION, SOLUTION INTRAVENOUS at 05:05

## 2021-01-01 RX ADMIN — PANTOPRAZOLE SODIUM 40 MG: 40 INJECTION, POWDER, LYOPHILIZED, FOR SOLUTION INTRAVENOUS at 08:05

## 2021-05-26 PROBLEM — N17.9 AKI (ACUTE KIDNEY INJURY): Status: ACTIVE | Noted: 2021-01-01

## 2021-05-26 PROBLEM — K92.2 UPPER GI BLEED: Status: ACTIVE | Noted: 2021-01-01

## 2021-05-26 PROBLEM — E11.9 DM2 (DIABETES MELLITUS, TYPE 2): Status: ACTIVE | Noted: 2021-01-01

## 2021-05-26 PROBLEM — I48.92 ATRIAL FIBRILLATION AND FLUTTER: Chronic | Status: ACTIVE | Noted: 2018-01-08

## 2021-05-26 PROBLEM — I31.39 PERICARDIAL EFFUSION: Status: ACTIVE | Noted: 2021-01-01

## 2021-05-26 PROBLEM — I50.32 CHRONIC DIASTOLIC CONGESTIVE HEART FAILURE: Chronic | Status: ACTIVE | Noted: 2017-12-14

## 2021-05-26 PROBLEM — K52.9 ENTERITIS: Status: ACTIVE | Noted: 2021-01-01

## 2021-05-26 PROBLEM — I49.5 SSS (SICK SINUS SYNDROME): Status: ACTIVE | Noted: 2021-01-01

## 2021-05-26 PROBLEM — E83.42 HYPOMAGNESEMIA: Status: ACTIVE | Noted: 2021-01-01

## 2021-05-26 PROBLEM — I48.91 ATRIAL FIBRILLATION AND FLUTTER: Chronic | Status: ACTIVE | Noted: 2018-01-08

## 2021-05-27 PROBLEM — N17.9 AKI (ACUTE KIDNEY INJURY): Status: RESOLVED | Noted: 2021-01-01 | Resolved: 2021-01-01

## 2021-05-27 PROBLEM — K92.2 UPPER GI BLEED: Status: RESOLVED | Noted: 2021-01-01 | Resolved: 2021-01-01

## 2021-05-27 PROBLEM — E83.42 HYPOMAGNESEMIA: Status: RESOLVED | Noted: 2021-01-01 | Resolved: 2021-01-01

## 2021-08-11 PROBLEM — G45.9 TIA (TRANSIENT ISCHEMIC ATTACK): Status: ACTIVE | Noted: 2021-01-01

## 2021-08-12 PROBLEM — I63.9 ACUTE CVA (CEREBROVASCULAR ACCIDENT): Status: ACTIVE | Noted: 2021-01-01

## 2021-08-12 PROBLEM — I65.23 BILATERAL CAROTID ARTERY STENOSIS: Status: ACTIVE | Noted: 2021-01-01

## 2021-08-12 PROBLEM — K52.9 ENTERITIS: Status: RESOLVED | Noted: 2021-01-01 | Resolved: 2021-01-01

## 2021-10-04 PROBLEM — I62.9 INTRACRANIAL HEMORRHAGE: Status: ACTIVE | Noted: 2021-01-01

## 2021-10-04 PROBLEM — Z51.5 COMFORT MEASURES ONLY STATUS: Status: ACTIVE | Noted: 2021-01-01

## 2021-10-04 PROBLEM — I61.0 NONTRAUMATIC SUBCORTICAL HEMORRHAGE OF RIGHT CEREBRAL HEMISPHERE: Status: ACTIVE | Noted: 2021-01-01

## 2021-10-04 PROBLEM — I62.01 NONTRAUMATIC ACUTE SUBDURAL HEMORRHAGE: Status: ACTIVE | Noted: 2021-01-01

## 2022-03-08 NOTE — H&P
[facility]  INPATIENT H&P    REASON FOR CONSULT/ADMISSION: choledocholithiasis     HPI: Treasure Rueda is a 84 y.o. female with history of hypertension, hyperlipidemia, CAD status post stent on anti-platelet therapy, proximal AFib, chronic diastolic dysfunction who recently underwent EUS/ERCP with sphincterotomy and stone removal for choledocholithiasis on 12/20/2019.  Subsequently discharged home with plans to follow up with General surgery as an outpatient.  She re-presented to the emergency room with a fever of 101.2 and abdominal pain. CT scan was completed which showed concerns for acute pancreatitis and choledocholithiasis with possible cholecystitis.  Patient was admitted started on Medicine.  GI and General surgery have been consulted.    Patient seen at bedside with daughter.  Denies any significant fevers, chills, nausea, vomiting.    I have reviewed the patient's chart including prior progress notes, procedures and testing. The patient has history of coronary artery disease status post stent which was reported in June of 2019.  On Eliquis 5 mg b.i.d. and Brilinta 90 mg b.i.d..  Last doses were yesterday morning.  Has previously stopping for ERCP.    ROS:   Review of Systems   Constitutional: Positive for activity change (Fatigue), appetite change and fatigue. Negative for chills and fever ( none since admit).   HENT: Negative for trouble swallowing.    Eyes: Negative for visual disturbance.   Respiratory: Negative for apnea, chest tightness and shortness of breath.    Cardiovascular: Positive for palpitations. Negative for chest pain and leg swelling.   Gastrointestinal: Negative for abdominal distention, abdominal pain ( resolved), nausea and vomiting.   Genitourinary: Negative for difficulty urinating, dysuria and hematuria.   Musculoskeletal: Negative for arthralgias, gait problem, neck pain and neck stiffness.   Skin: Negative for color change, pallor, rash and wound.    Neurological: Negative for seizures, syncope and light-headedness.   Psychiatric/Behavioral: Negative for agitation, behavioral problems and confusion.       PROBLEM LIST:  Patient Active Problem List   Diagnosis    Osteoporosis, post-menopausal    Hypothyroidism due to acquired atrophy of thyroid    Essential hypertension    Hyperlipidemia    Adhesive capsulitis    Prediabetes    Shortness of breath    At risk for heart disease    Coronary artery disease involving native coronary artery    Acute on chronic diastolic congestive heart failure    Elevated troponin    Atrial flutter with rapid ventricular response    Weakness of left arm    Stenosis of right carotid artery    SVT (supraventricular tachycardia)    Tachy-jonh syndrome    Abnormal finding on cardiovascular stress test    Paroxysmal atrial fibrillation    Choledocholithiasis    Fever, unknown origin    UTI (urinary tract infection)         HISTORY  Past Medical History:   Diagnosis Date    Anticoagulant long-term use     Arthritis     Atrial flutter     Stent place in December 2017    CAD (coronary artery disease)     with stent placement    CHF (congestive heart failure)     Encounter for blood transfusion     Gall stones     Hearing loss of both ears     Hyperlipidemia     Hypertension     Hypothyroidism     Osteoporosis, post-menopausal        Past Surgical History:   Procedure Laterality Date    ABLATION N/A 9/5/2018    Procedure: ABLATION;  Surgeon: Damir David MD;  Location: [facility] CATH LAB;  Service: Cardiology;  Laterality: N/A;  SVT, SSS, RFA, OSCAR, +/- Dual PPM, ______, Choice, MB, 3 Prep *Contrast Prep*    APPENDECTOMY      BLADDER SUSPENSION      CORONARY ANGIOPLASTY WITH STENT PLACEMENT      ENDOSCOPIC ULTRASOUND OF UPPER GASTROINTESTINAL TRACT N/A 12/20/2019    Procedure: ULTRASOUND, UPPER GI TRACT, ENDOSCOPIC;  Surgeon: Meredith Ford MD;  Location: [facility] ENDO;  Service: Endoscopy;   Laterality: N/A;    ERCP  2019    ERCP N/A 2019    Procedure: ERCP (ENDOSCOPIC RETROGRADE CHOLANGIOPANCREATOGRAPHY);  Surgeon: Meredith Ford MD;  Location: [facility] ENDO;  Service: Endoscopy;  Laterality: N/A;    HYSTERECTOMY      INSERTION OF IMPLANTABLE LOOP RECORDER N/A 2019    Procedure: Insertion, Implantable Loop Recorder;  Surgeon: Clifton Panda MD;  Location: [facility] CATH LAB;  Service: Cardiology;  Laterality: N/A;    LEFT HEART CATHETERIZATION Left 2019    Procedure: Left heart cath;  Surgeon: Clifton Panda MD;  Location: [facility] CATH LAB;  Service: Cardiology;  Laterality: Left;    TONSILLECTOMY         Social History     Tobacco Use    Smoking status: Former Smoker     Last attempt to quit: 1971     Years since quittin.0    Smokeless tobacco: Never Used   Substance Use Topics    Alcohol use: Yes     Comment: 3-4 cans of beer daily    Drug use: No       Family History   Problem Relation Age of Onset    Stroke Mother     Cancer Mother         breast cancer    Cancer Father     Multiple sclerosis Daughter     Cancer Son         colon and liver         MEDS:  Current Facility-Administered Medications on File Prior to Encounter   Medication Dose Route Frequency Provider Last Rate Last Dose    [COMPLETED] ciprofloxacin (CIPRO)400mg/200ml D5W IVPB 400 mg  400 mg Intravenous ED 1 Time Fransisca Caicedo MD   Stopped at 19    [COMPLETED] dexamethasone injection 8 mg  8 mg Intravenous ED 1 Time Fransisca Caicedo MD   8 mg at 19    [COMPLETED] diphenhydrAMINE injection 25 mg  25 mg Intravenous ED 1 Time Fransisca Caicedo MD   25 mg at 19    [COMPLETED] iohexol (OMNIPAQUE 350) injection 75 mL  75 mL Intravenous ONCE PRN Fransisca Caicedo MD   75 mL at 19 183    [COMPLETED] piperacillin-tazobactam 4.5 g in dextrose 5 % 100 mL IVPB (ready to mix system)  4.5 g Intravenous ED 1 Time Fransisca Caicedo MD   Stopped at 19  1848    [COMPLETED] sodium chloride 0.9% bolus 1,797 mL  30 mL/kg Intravenous ED 1 Time Fransisca Caicedo MD   Stopped at 12/24/19 2030    [DISCONTINUED] piperacillin-tazobactam 4.5 g in dextrose 5 % 100 mL IVPB (ready to mix system)  4.5 g Intravenous ED 1 Time Tushar Rivera III, MD         Current Outpatient Medications on File Prior to Encounter   Medication Sig Dispense Refill    acetaminophen (TYLENOL) 325 MG tablet Take 325 mg by mouth daily as needed for Pain.      amLODIPine (NORVASC) 2.5 MG tablet Take 2.5 mg by mouth once daily.      apixaban (ELIQUIS) 5 mg Tab Take 5 mg by mouth 2 (two) times daily.      COD LIVER OIL ORAL Take 1 capsule by mouth once daily.       folic acid (FOLVITE) 400 MCG tablet Take 400 mcg by mouth once daily.      irbesartan (AVAPRO) 150 MG tablet Take 1 tablet (150 mg total) by mouth once daily. (Patient taking differently: Take 150 mg by mouth every evening. )      levothyroxine (SYNTHROID) 75 MCG tablet Take 1 tablet (75 mcg total) by mouth once daily. 90 tablet 1    rosuvastatin (CRESTOR) 40 MG Tab Take 10 mg by mouth every evening.      spironolactone (ALDACTONE) 25 MG tablet Take 25 mg by mouth once daily.      ticagrelor (BRILINTA) 90 mg tablet Take 1 tablet (90 mg total) by mouth 2 (two) times daily. 60 tablet 11    vitamin D 185 MG Tab Take 185 mg by mouth once daily.      alendronate (FOSAMAX) 70 MG tablet Take 1 tablet (70 mg total) by mouth every 7 days. 12 tablet 4       ALLERGIES:  Review of patient's allergies indicates:   Allergen Reactions    Iodine and iodide containing products Nausea And Vomiting, Swelling and Rash    Shellfish containing products Diarrhea    Anectine [succinylcholine chloride]      Family history of issues (multiple family members)    Fish containing products     Pork/porcine containing products     Squash          VITALS:  Temp:  [97.4 °F (36.3 °C)-100.6 °F (38.1 °C)] 97.4 °F (36.3 °C)  Pulse:  [] 62  Resp:  [16-20]  17  SpO2:  [96 %-99 %] 96 %  BP: (138-188)/(65-97) 158/84    I/O last 3 completed shifts:  In: 91.3 [I.V.:41.3; IV Piggyback:50]  Out: 700 [Urine:700]      PHYSICAL EXAM:  Physical Exam   Constitutional: She is oriented to person, place, and time. She appears well-developed and well-nourished. No distress.   HENT:   Head: Normocephalic and atraumatic.   Nose: Nose normal.   Eyes: Conjunctivae and EOM are normal. No scleral icterus.   Neck: Normal range of motion. Neck supple. No tracheal tenderness present. No tracheal deviation present.   Cardiovascular: Normal rate, regular rhythm and intact distal pulses.   Pulmonary/Chest: Effort normal and breath sounds normal. No accessory muscle usage or stridor. No respiratory distress.   Abdominal: Soft. Normal appearance. She exhibits no distension, no ascites and no mass. There is no tenderness (No significant tenderness palpation). There is no rebound. No hernia.   Well-healed lower transverse abdominal incision and lower midline incision   Musculoskeletal: Normal range of motion. She exhibits no edema or deformity.   Neurological: She is alert and oriented to person, place, and time. She exhibits normal muscle tone.   Skin: Skin is warm and dry. No rash noted. She is not diaphoretic. No erythema.   Psychiatric: She has a normal mood and affect. Her behavior is normal. Judgment and thought content normal.   Vitals reviewed.        LABS:  Lab Results   Component Value Date    WBC 8.38 12/25/2019    RBC 3.92 (L) 12/25/2019    HGB 11.5 (L) 12/25/2019    HCT 35.6 (L) 12/25/2019     12/25/2019     Lab Results   Component Value Date     (H) 12/25/2019     12/25/2019    K 3.6 12/25/2019     12/25/2019    CO2 18 (L) 12/25/2019    BUN 10 12/25/2019    CREATININE 0.8 12/25/2019    CALCIUM 8.9 12/25/2019     Lab Results   Component Value Date    ALT 22 12/25/2019    AST 14 12/25/2019    ALKPHOS 144 (H) 12/25/2019    BILITOT 0.4 12/25/2019     Lab Results    Component Value Date    MG 1.8 07/11/2018       STUDIES:  CT images and reports were personally reviewed.    Patient appears to have acute pancreatitis and some mild-to-moderate thickening of the gallbladder wall.  There is cholelithiasis and also what appears to be a 4-5 mm stone seen in the distal common bile duct.  There is no significant biliary dilatation.    IMPRESSION  Acute pancreatitis.    Cholelithiasis and choledocholithiasis.  Mild nonspecific thickening of the gallbladder wall which may be reactive.  The possibility of acute cholecystitis can not be entirely excluded.    Hepatomegaly.  Consider correlation with liver function tests.    Colonic diverticulosis.    Left pelvic adenopathy again noted.    Small pericardial effusion.  Moderate hiatal hernia.      ASSESSMENT & PLAN:  84 y.o. female with     CHOLEDOCHOLITHIASIS  - patient with likely a recurrent choledocholithiasis despite previous ERCP with sphincterotomy  - GI was consulted was considering MRCP prior to possible repeat endoscopy  - however after discussion with GI we can plan to proceed with laparoscopic cholecystectomy at which time we will perform intraoperative cholangiogram, if obvious filling defects her present we will attempt to remove it, if were unsuccessful and will contact GI for endoscopic intervention  - agree with antibiotic coverage to cover for cholangitis  - plan for laparoscopic cholecystectomy with cholangiogram on 12/26/2019  - will make NPO at midnight  - cefoxitin on call to the OR  - hold anticoagulation  - patient has allergy to iodine however had no issues with Omnipaque administered during ERCP    ACUTE PANCREATITIS  - likely related to choledocholithiasis  - currently without any significant abdominal pain, nausea or vomiting  - okay for clears, NPO at midnight    CORONARY ARTERY DISEASE  - stents placed in June  - has stopped anti-platelet therapy before for ERCP  - will hold for today with plans to restart  tomorrow evening or the following morning                   carried
